# Patient Record
Sex: MALE | Race: WHITE | Employment: FULL TIME | URBAN - METROPOLITAN AREA
[De-identification: names, ages, dates, MRNs, and addresses within clinical notes are randomized per-mention and may not be internally consistent; named-entity substitution may affect disease eponyms.]

---

## 2017-05-01 RX ORDER — TRAMADOL HYDROCHLORIDE 50 MG/1
50 TABLET ORAL EVERY 8 HOURS PRN
Qty: 30 TABLET | Refills: 0 | OUTPATIENT
Start: 2017-05-01 | End: 2017-05-11

## 2017-06-29 ENCOUNTER — OFFICE VISIT (OUTPATIENT)
Dept: FAMILY MEDICINE CLINIC | Age: 62
End: 2017-06-29

## 2017-06-29 VITALS
RESPIRATION RATE: 18 BRPM | OXYGEN SATURATION: 95 % | DIASTOLIC BLOOD PRESSURE: 84 MMHG | HEIGHT: 68 IN | SYSTOLIC BLOOD PRESSURE: 128 MMHG | WEIGHT: 175.6 LBS | BODY MASS INDEX: 26.61 KG/M2 | HEART RATE: 64 BPM

## 2017-06-29 DIAGNOSIS — M50.90 CERVICAL NECK PAIN WITH EVIDENCE OF DISC DISEASE: ICD-10-CM

## 2017-06-29 DIAGNOSIS — I10 ESSENTIAL HYPERTENSION, BENIGN: ICD-10-CM

## 2017-06-29 DIAGNOSIS — E04.1 THYROID NODULE: Primary | ICD-10-CM

## 2017-06-29 PROCEDURE — 99214 OFFICE O/P EST MOD 30 MIN: CPT | Performed by: INTERNAL MEDICINE

## 2017-06-29 ASSESSMENT — ENCOUNTER SYMPTOMS
ALLERGIC/IMMUNOLOGIC NEGATIVE: 1
GASTROINTESTINAL NEGATIVE: 1
RESPIRATORY NEGATIVE: 1
BACK PAIN: 0

## 2017-07-03 ENCOUNTER — HOSPITAL ENCOUNTER (OUTPATIENT)
Dept: ULTRASOUND IMAGING | Age: 62
Discharge: HOME | End: 2017-07-03
Attending: INTERNAL MEDICINE | Admitting: INTERNAL MEDICINE

## 2017-07-03 DIAGNOSIS — E04.1 THYROID NODULE: ICD-10-CM

## 2017-07-05 ENCOUNTER — TELEPHONE (OUTPATIENT)
Dept: FAMILY MEDICINE CLINIC | Age: 62
End: 2017-07-05

## 2017-07-05 NOTE — TELEPHONE ENCOUNTER
Patient returned call regarding results. I read the physician notes, he understood, will discuss further at his next office visit (September).

## 2017-09-19 ENCOUNTER — OFFICE VISIT (OUTPATIENT)
Dept: ORTHOPEDIC SURGERY | Age: 62
End: 2017-09-19

## 2017-09-19 VITALS — WEIGHT: 180 LBS | BODY MASS INDEX: 27.28 KG/M2 | HEIGHT: 68 IN

## 2017-09-19 DIAGNOSIS — M25.512 LEFT SHOULDER PAIN, UNSPECIFIED CHRONICITY: Primary | ICD-10-CM

## 2017-09-19 DIAGNOSIS — M75.02 ADHESIVE CAPSULITIS OF LEFT SHOULDER: ICD-10-CM

## 2017-09-19 PROCEDURE — 73030 X-RAY EXAM OF SHOULDER: CPT | Performed by: PHYSICIAN ASSISTANT

## 2017-09-19 PROCEDURE — 99243 OFF/OP CNSLTJ NEW/EST LOW 30: CPT | Performed by: PHYSICIAN ASSISTANT

## 2017-09-19 RX ORDER — GABAPENTIN 100 MG/1
CAPSULE ORAL
Refills: 2 | COMMUNITY
Start: 2017-09-09 | End: 2018-07-27 | Stop reason: ALTCHOICE

## 2017-09-19 RX ORDER — HYDROCODONE BITARTRATE AND ACETAMINOPHEN 5; 325 MG/1; MG/1
TABLET ORAL
Refills: 0 | COMMUNITY
Start: 2017-07-06 | End: 2018-01-09 | Stop reason: ALTCHOICE

## 2017-09-21 ENCOUNTER — TELEPHONE (OUTPATIENT)
Dept: ORTHOPEDIC SURGERY | Age: 62
End: 2017-09-21

## 2017-09-25 ENCOUNTER — OFFICE VISIT (OUTPATIENT)
Dept: FAMILY MEDICINE CLINIC | Age: 62
End: 2017-09-25

## 2017-09-25 ENCOUNTER — TELEPHONE (OUTPATIENT)
Dept: FAMILY MEDICINE CLINIC | Age: 62
End: 2017-09-25

## 2017-09-25 VITALS
HEIGHT: 68 IN | OXYGEN SATURATION: 96 % | WEIGHT: 180.2 LBS | HEART RATE: 60 BPM | DIASTOLIC BLOOD PRESSURE: 72 MMHG | BODY MASS INDEX: 27.31 KG/M2 | SYSTOLIC BLOOD PRESSURE: 124 MMHG

## 2017-09-25 DIAGNOSIS — M75.02 ADHESIVE CAPSULITIS OF LEFT SHOULDER: Primary | ICD-10-CM

## 2017-09-25 DIAGNOSIS — Z01.818 PRE-OP EXAM: ICD-10-CM

## 2017-09-25 PROCEDURE — 99242 OFF/OP CONSLTJ NEW/EST SF 20: CPT | Performed by: PHYSICIAN ASSISTANT

## 2017-09-25 PROCEDURE — 93000 ELECTROCARDIOGRAM COMPLETE: CPT | Performed by: PHYSICIAN ASSISTANT

## 2017-09-25 RX ORDER — TRAMADOL HYDROCHLORIDE 50 MG/1
50 TABLET ORAL EVERY 8 HOURS PRN
Qty: 30 TABLET | Refills: 0 | Status: SHIPPED | OUTPATIENT
Start: 2017-09-25 | End: 2017-10-05

## 2017-09-25 RX ORDER — MAGNESIUM GLUCONATE 27 MG(500)
500 TABLET ORAL 2 TIMES DAILY
COMMUNITY
End: 2020-01-13 | Stop reason: ALTCHOICE

## 2017-09-26 ENCOUNTER — TELEPHONE (OUTPATIENT)
Dept: FAMILY MEDICINE CLINIC | Age: 62
End: 2017-09-26

## 2017-10-04 DIAGNOSIS — M75.02 ADHESIVE CAPSULITIS OF LEFT SHOULDER: Primary | ICD-10-CM

## 2017-10-05 ENCOUNTER — HOSPITAL ENCOUNTER (OUTPATIENT)
Dept: PHYSICAL THERAPY | Age: 62
Discharge: HOME | End: 2017-10-31
Admitting: ORTHOPAEDIC SURGERY

## 2017-10-05 NOTE — PLAN OF CARE
tear   []Signs/symptoms consistent with labral tear   []Signs/symptoms consistent with postural dysfunction    []Signs/symptoms consistent with Glenohumeral IR Deficit - <45 degrees   []Signs/symptoms consistent with facet dysfunction of cervical/thoracic spine    []Signs/symptoms consistent with pathology which may benefit from Dry needling     []other:     Prognosis/Rehab Potential:      []Excellent   [x]Good    []Fair   []Poor    Tolerance of evaluation/treatment:    []Excellent   [x]Good    []Fair   []Poor    Physical Therapy Evaluation Complexity Justification  [x] A history of present problem with:  [] no personal factors and/or comorbidities that impact the plan of care;  [x]1-2 personal factors and/or comorbidities that impact the plan of care  []3 personal factors and/or comorbidities that impact the plan of care  [x] An examination of body systems using standardized tests and measures addressing any of the following: body structures and functions (impairments), activity limitations, and/or participation restrictions;:  [] a total of 1-2 or more elements   [] a total of 3 or more elements   [x] a total of 4 or more elements   [x] A clinical presentation with:  [x] stable and/or uncomplicated characteristics   [] evolving clinical presentation with changing characteristics  [] unstable and unpredictable characteristics;   [] Clinical decision making of [x] low, [] moderate, [] high complexity using standardized patient assessment instrument and/or measurable assessment of functional outcome. [x] EVAL (LOW) 54837 (typically 20 minutes face-to-face)  [] EVAL (MOD) 35287 (typically 30 minutes face-to-face)  [] EVAL (HIGH) 57789 (typically 45 minutes face-to-face)  [] RE-EVAL     PLAN:  Frequency/Duration:  5 days per week for 2 Weeks, then 2-3x/wk for 4 weeks. :  INTERVENTIONS:  1. Therapeutic exercise including: strength training, ROM, NMR and proprioception for the scapula, core and Upper extremity  2.

## 2017-10-05 NOTE — FLOWSHEET NOTE
[facility] - Orthopaedics and Sports Rehabilitation, [place]    Physical Therapy Daily Treatment Note  Date:  10/5/2017    Patient Name:  Lauren Parker    :  1955  MRN: [id number]  Restrictions/Precautions:    Medical/Treatment Diagnosis Information:  · Diagnosis: M75.02 (ICD-10-CM) - Adhesive capsulitis of left shoulder- S/P INGRID  · Treatment Diagnosis: Left Shoulder Pain and Stiffness. Insurance/Certification information:  PT Insurance Information: UMR  Physician Information:  Referring Practitioner: Dr. Komal Wells of care signed (Y/N):     Date of Patient follow up with Physician:     G-Code (if applicable):      Date G-Code Applied:    PT G-Codes  Functional Assessment Tool Used: Michael Spindle  Score: 64%  Functional Limitation: Carrying, moving and handling objects  Carrying, Moving and Handling Objects Current Status (): At least 60 percent but less than 80 percent impaired, limited or restricted  Carrying, Moving and Handling Objects Goal Status ():  At least 20 percent but less than 40 percent impaired, limited or restricted    Progress Note: [x]  Yes  []  No  Next due by: Visit #10      Latex Allergy:  [x]NO      []YES  Preferred Language for Healthcare:   [x]English       []other:    Visit # Insurance Allowable   1 60     Pain level:  3-810     SUBJECTIVE:  See eval    OBJECTIVE: See eval  Observation:   Test measurements:      RESTRICTIONS/PRECAUTIONS: HBP, OA and Cervical Spine Stenosis    Exercises/Interventions:     Script-17  Stretching/AAROM  Repetitions/Resistance Notes/Last Progression   Pulley/Wallslides     Cane Flex/ ER X15/15x5\"    IR Strap 10x5\"    Sleeper Stretch     Tableslide Flex/ ER/ Abd 10x10\"    ER Hands Behind the Head x1' HEP asked to perform x2 reps   Cross Arm Stretch     Upper Trap Stretch     Levator Scapula Stretch     Corner Stretch                  Exercises     Shrugs/ Shoulder Blade Squeeze     Shoulder Isometrics     SA Punch/ ABC     Supine Short Lever Flex     Supine Flexion     SL ER/ SL Abd     Prone Row/ Ext/ HAB/ Scap     TB Row/ Ext/ LTD     TB ER/ IR     No Money/ HAB     Finisher                      Manual       Oscillations-Mobs:  G-I, II, III, IV (PA's, Inf., Post.) x5'    PROM x10'    Cervical PA's Grade-     Thoracic PA's Grade-     STM-                   Therapeutic Exercise and NMR EXR  [] (87427) Provided verbal/tactile cueing for activities related to strengthening, flexibility, endurance, ROM  for improvements in scapular, scapulothoracic and UE control with self care, reaching, carrying, lifting, house/yardwork, driving/computer work.    [] (43611) Provided verbal/tactile cueing for activities related to improving balance, coordination, kinesthetic sense, posture, motor skill, proprioception  to assist with  scapular, scapulothoracic and UE control with self care, reaching, carrying, lifting, house/yardwork, driving/computer work. Therapeutic Activities:    [] (92717 or 12514) Provided verbal/tactile cueing for activities related to improving balance, coordination, kinesthetic sense, posture, motor skill, proprioception and motor activation to allow for proper function of scapular, scapulothoracic and UE control with self care, carrying, lifting, driving/computer work.      Home Exercise Program:    [x] (53943) Reviewed/Progressed HEP activities related to strengthening, flexibility, endurance, ROM of scapular, scapulothoracic and UE control with self care, reaching, carrying, lifting, house/yardwork, driving/computer work  [] (90514) Reviewed/Progressed HEP activities related to improving balance, coordination, kinesthetic sense, posture, motor skill, proprioception of scapular, scapulothoracic and UE control with self care, reaching, carrying, lifting, house/yardwork, driving/computer work      Manual Treatments:  PROM / STM / Oscillations-Mobs:  G-I, II, III, IV (PA's, Inf., Post.)  [x] (72096) Provided manual therapy to due to co-morbidities. [x] Plan just implemented, too soon to assess goals progression  [] Other:     ASSESSMENT:  See eval    Treatment/Activity Tolerance:  [x] Patient tolerated treatment well [] Patient limited by fatique  [] Patient limited by pain  [] Patient limited by other medical complications  [] Other:     Prognosis: [x] Good [] Fair  [] Poor    Patient Requires Follow-up: [x] Yes  [] No    PLAN: See eval  [] Continue per plan of care [] Alter current plan (see comments)  [x] Plan of care initiated [] Hold pending MD visit [] Discharge    *If patient does not return for further follow ups after this date. Please consider this as the patients discharge from physical therapy.      Electronically signed by: Estrella Blanco, [facility] [facility], Carmen Vaughn 1

## 2017-10-06 ENCOUNTER — HOSPITAL ENCOUNTER (OUTPATIENT)
Dept: PHYSICAL THERAPY | Age: 62
Discharge: HOME | End: 2017-10-07
Admitting: ORTHOPAEDIC SURGERY

## 2017-10-09 ENCOUNTER — HOSPITAL ENCOUNTER (OUTPATIENT)
Dept: PHYSICAL THERAPY | Age: 62
Discharge: HOME | End: 2017-10-10
Admitting: ORTHOPAEDIC SURGERY

## 2017-10-10 ENCOUNTER — HOSPITAL ENCOUNTER (OUTPATIENT)
Dept: PHYSICAL THERAPY | Age: 62
Discharge: HOME | End: 2017-10-11
Admitting: ORTHOPAEDIC SURGERY

## 2017-10-11 ENCOUNTER — HOSPITAL ENCOUNTER (OUTPATIENT)
Dept: PHYSICAL THERAPY | Age: 62
Discharge: HOME | End: 2017-10-12
Admitting: ORTHOPAEDIC SURGERY

## 2017-10-12 ENCOUNTER — HOSPITAL ENCOUNTER (OUTPATIENT)
Dept: PHYSICAL THERAPY | Age: 62
Discharge: HOME | End: 2017-10-13
Admitting: ORTHOPAEDIC SURGERY

## 2017-10-12 ENCOUNTER — OFFICE VISIT (OUTPATIENT)
Dept: ORTHOPEDIC SURGERY | Age: 62
End: 2017-10-12

## 2017-10-12 VITALS
DIASTOLIC BLOOD PRESSURE: 82 MMHG | SYSTOLIC BLOOD PRESSURE: 111 MMHG | WEIGHT: 180.12 LBS | HEIGHT: 68 IN | HEART RATE: 70 BPM | BODY MASS INDEX: 27.3 KG/M2

## 2017-10-12 DIAGNOSIS — M67.40 MUCOID CYST OF JOINT: Primary | ICD-10-CM

## 2017-10-12 PROBLEM — M48.02 CERVICAL SPINAL STENOSIS: Status: ACTIVE | Noted: 2017-05-23

## 2017-10-12 PROCEDURE — 99203 OFFICE O/P NEW LOW 30 MIN: CPT | Performed by: PODIATRIST

## 2017-10-12 RX ORDER — GABAPENTIN 100 MG/1
CAPSULE ORAL
COMMUNITY
Start: 2017-08-21 | End: 2018-01-09 | Stop reason: DRUGHIGH

## 2017-10-12 RX ORDER — METHYLPREDNISOLONE 4 MG/1
TABLET ORAL
COMMUNITY
Start: 2017-05-08 | End: 2017-12-12 | Stop reason: ALTCHOICE

## 2017-10-12 RX ORDER — TIZANIDINE 2 MG/1
TABLET ORAL
COMMUNITY
Start: 2017-05-08 | End: 2018-01-09 | Stop reason: ALTCHOICE

## 2017-10-12 RX ORDER — GABAPENTIN 100 MG/1
100 CAPSULE ORAL
COMMUNITY
End: 2018-01-09 | Stop reason: DRUGHIGH

## 2017-10-12 RX ORDER — OXYCODONE HYDROCHLORIDE AND ACETAMINOPHEN 5; 325 MG/1; MG/1
TABLET ORAL
COMMUNITY
Start: 2017-10-04 | End: 2018-07-27 | Stop reason: ALTCHOICE

## 2017-10-12 RX ORDER — IBUPROFEN 200 MG
200 TABLET ORAL
COMMUNITY
End: 2018-01-09 | Stop reason: SDUPTHER

## 2017-10-12 RX ORDER — GABAPENTIN 100 MG/1
CAPSULE ORAL
COMMUNITY
Start: 2017-08-18 | End: 2018-01-09 | Stop reason: DRUGHIGH

## 2017-10-12 RX ORDER — LISINOPRIL 20 MG/1
20 TABLET ORAL
COMMUNITY
End: 2018-01-09 | Stop reason: SDUPTHER

## 2017-10-13 ENCOUNTER — HOSPITAL ENCOUNTER (OUTPATIENT)
Dept: PHYSICAL THERAPY | Age: 62
Discharge: HOME | End: 2017-10-14
Admitting: ORTHOPAEDIC SURGERY

## 2017-10-16 ENCOUNTER — HOSPITAL ENCOUNTER (OUTPATIENT)
Dept: PHYSICAL THERAPY | Age: 62
Discharge: HOME | End: 2017-10-17
Admitting: ORTHOPAEDIC SURGERY

## 2017-10-17 ENCOUNTER — OFFICE VISIT (OUTPATIENT)
Dept: ORTHOPEDIC SURGERY | Age: 62
End: 2017-10-17

## 2017-10-17 DIAGNOSIS — M75.02 ADHESIVE CAPSULITIS OF LEFT SHOULDER: Primary | ICD-10-CM

## 2017-10-17 PROCEDURE — 99024 POSTOP FOLLOW-UP VISIT: CPT | Performed by: ORTHOPAEDIC SURGERY

## 2017-10-18 ENCOUNTER — HOSPITAL ENCOUNTER (OUTPATIENT)
Dept: PHYSICAL THERAPY | Age: 62
Discharge: HOME | End: 2017-10-19
Admitting: ORTHOPAEDIC SURGERY

## 2017-10-23 ENCOUNTER — HOSPITAL ENCOUNTER (OUTPATIENT)
Dept: PHYSICAL THERAPY | Age: 62
Discharge: HOME | End: 2017-10-24
Admitting: ORTHOPAEDIC SURGERY

## 2017-10-27 ENCOUNTER — HOSPITAL ENCOUNTER (OUTPATIENT)
Dept: PHYSICAL THERAPY | Age: 62
Discharge: HOME | End: 2017-10-28
Admitting: ORTHOPAEDIC SURGERY

## 2017-10-30 ENCOUNTER — HOSPITAL ENCOUNTER (OUTPATIENT)
Dept: PHYSICAL THERAPY | Age: 62
Discharge: HOME | End: 2017-10-31
Admitting: ORTHOPAEDIC SURGERY

## 2017-11-01 ENCOUNTER — HOSPITAL ENCOUNTER (OUTPATIENT)
Dept: PHYSICAL THERAPY | Age: 62
Discharge: HOME | End: 2017-11-02
Admitting: ORTHOPAEDIC SURGERY

## 2017-11-01 ENCOUNTER — HOSPITAL ENCOUNTER (OUTPATIENT)
Dept: PHYSICAL THERAPY | Age: 62
Discharge: HOME | End: 2017-11-30
Attending: ORTHOPAEDIC SURGERY | Admitting: ORTHOPAEDIC SURGERY

## 2017-11-06 ENCOUNTER — HOSPITAL ENCOUNTER (OUTPATIENT)
Dept: PHYSICAL THERAPY | Age: 62
Discharge: HOME | End: 2017-11-07
Admitting: ORTHOPAEDIC SURGERY

## 2017-11-06 ENCOUNTER — OFFICE VISIT (OUTPATIENT)
Dept: ORTHOPEDIC SURGERY | Age: 62
End: 2017-11-06

## 2017-11-06 VITALS
SYSTOLIC BLOOD PRESSURE: 130 MMHG | WEIGHT: 180.12 LBS | DIASTOLIC BLOOD PRESSURE: 78 MMHG | HEIGHT: 68 IN | HEART RATE: 70 BPM | BODY MASS INDEX: 27.3 KG/M2

## 2017-11-06 DIAGNOSIS — M67.40 MUCOID CYST OF JOINT: Primary | ICD-10-CM

## 2017-11-06 PROCEDURE — 99213 OFFICE O/P EST LOW 20 MIN: CPT | Performed by: PODIATRIST

## 2017-11-06 NOTE — PROGRESS NOTES
This is a return visit for patient to suddenly had increased pain with the cyst on his left great toe last week. Apparently the symptoms have totally resolved. Without any injury he started having pain that actually woke him up at night. This seemed to persist for a couple of days. He has noticed that the cyst appears to be smaller. He denies seeing any bleeding or drainage. The immediate skin around the cyst at the plantar medial aspect of left hallux is slightly ruborous and exfoliating. No drainage or signs of infection are noted. He has no palpable tenderness at this time. The size of the lesion doesn't appear to be slightly smaller. It still remains slightly indurated and appears to be fluid-filled. He has palpable pedal pulses. His sensation is grossly intact. Mucoid cyst left hallux    Since he is asymptomatic again, nothing needs to be done other than continue to monitor this. He may go through several episodes of this from time to time. I suspect that the cyst did rupture and part of the contents probably didn't leak out. As long as he does not see any active bleeding or any drainage itself, nothing needs to be done.

## 2017-11-08 ENCOUNTER — HOSPITAL ENCOUNTER (OUTPATIENT)
Dept: PHYSICAL THERAPY | Age: 62
Discharge: HOME | End: 2017-11-09
Admitting: ORTHOPAEDIC SURGERY

## 2017-11-13 ENCOUNTER — HOSPITAL ENCOUNTER (OUTPATIENT)
Dept: PHYSICAL THERAPY | Age: 62
Discharge: HOME | End: 2017-11-14
Admitting: ORTHOPAEDIC SURGERY

## 2017-11-15 ENCOUNTER — HOSPITAL ENCOUNTER (OUTPATIENT)
Dept: PHYSICAL THERAPY | Age: 62
Discharge: HOME | End: 2017-11-16
Admitting: ORTHOPAEDIC SURGERY

## 2017-11-20 ENCOUNTER — HOSPITAL ENCOUNTER (OUTPATIENT)
Dept: PHYSICAL THERAPY | Age: 62
Discharge: HOME | End: 2017-11-21
Admitting: ORTHOPAEDIC SURGERY

## 2017-11-22 ENCOUNTER — HOSPITAL ENCOUNTER (OUTPATIENT)
Dept: PHYSICAL THERAPY | Age: 62
Discharge: HOME | End: 2017-11-23
Admitting: ORTHOPAEDIC SURGERY

## 2017-11-27 ENCOUNTER — HOSPITAL ENCOUNTER (OUTPATIENT)
Dept: PHYSICAL THERAPY | Age: 62
Discharge: HOME | End: 2017-11-28
Admitting: ORTHOPAEDIC SURGERY

## 2017-11-29 ENCOUNTER — HOSPITAL ENCOUNTER (OUTPATIENT)
Dept: PHYSICAL THERAPY | Age: 62
Discharge: HOME | End: 2017-11-30
Admitting: ORTHOPAEDIC SURGERY

## 2017-12-01 ENCOUNTER — HOSPITAL ENCOUNTER (OUTPATIENT)
Dept: PHYSICAL THERAPY | Age: 62
Discharge: HOME | End: 2017-12-31
Attending: ORTHOPAEDIC SURGERY | Admitting: ORTHOPAEDIC SURGERY

## 2017-12-04 RX ORDER — LISINOPRIL 20 MG/1
20 TABLET ORAL DAILY
Qty: 90 TABLET | Refills: 3 | Status: SHIPPED | OUTPATIENT
Start: 2017-12-04 | End: 2017-12-21 | Stop reason: SDUPTHER

## 2017-12-04 NOTE — TELEPHONE ENCOUNTER
From: Lauren Parker  Sent: 12/4/2017 10:16 AM EST  Subject: Medication Renewal Request    Lauren Parker would like a refill of the following medications:  lisinopril (PRINIVIL;ZESTRIL) 20 MG tablet [TREVON Dave MD]    Preferred pharmacy: [address] [place] [address] , [place]    Comment:  Stephen Ramirez Laughter, my prescription for Lisinopril is running out and the label says \"no refills remaining. \" Can a refill be ordered?  Thanks, Soren Hernandez [place], [place]

## 2017-12-06 ENCOUNTER — HOSPITAL ENCOUNTER (OUTPATIENT)
Dept: PHYSICAL THERAPY | Age: 62
Discharge: HOME | End: 2017-12-07
Admitting: ORTHOPAEDIC SURGERY

## 2017-12-08 ENCOUNTER — HOSPITAL ENCOUNTER (OUTPATIENT)
Dept: PHYSICAL THERAPY | Age: 62
Discharge: HOME | End: 2017-12-09
Admitting: ORTHOPAEDIC SURGERY

## 2017-12-12 ENCOUNTER — OFFICE VISIT (OUTPATIENT)
Dept: ORTHOPEDIC SURGERY | Age: 62
End: 2017-12-12

## 2017-12-12 VITALS — HEIGHT: 68 IN | BODY MASS INDEX: 27.3 KG/M2 | WEIGHT: 180.12 LBS

## 2017-12-12 DIAGNOSIS — M75.02 ADHESIVE CAPSULITIS OF LEFT SHOULDER: Primary | ICD-10-CM

## 2017-12-12 PROCEDURE — 99213 OFFICE O/P EST LOW 20 MIN: CPT | Performed by: ORTHOPAEDIC SURGERY

## 2017-12-12 NOTE — PROGRESS NOTES
HISTORY OF PRESENT ILLNESS: The patient returns today for 6 weeks after closed manipulation under anesthesia of his left shoulder. He has attended physical therapy and feels significantly improved    REVIEW OF SYSTEMS: Pertinent items are noted in the HPI. Review of symptoms reviewed from the Patient History Form and dated on 10/12/17 are available in the patient's chart under the Media tab. PHYSICAL EXAMINATION: Inspection of the affected left shoulder reveals warm, dry, intact skin. There is no adenopathy. The distal neurovascular exam is grossly intact. There is no glenohumeral instability. He has 160° of active forward elevation. He can position his hand behind his head. With the arm side, he has 25° of external rotation. He has 5 minus out of 5 forward elevation and external rotation strength    Examination of the contralateral shoulder reveals no atrophy or deformity. The skin is warm and dry. Range of motion is within normal limits. There is no focal tenderness with palpation. Provocative SLAP, biceps tension, apprehension AC joint or rotator cuff tests are negative. Strength is graded 5/5 in all muscle groups. The distal neurovascular exam is grossly intact. Cervical spine: The skin is warm and dry. There is no swelling, warmth, or erythema. Range of motion is within normal limits. There is no paraspinal or spinous process tenderness. Spurling's sign is negative and did not produce shoulder pain. The distal neurovascular exam is grossly intact. ASSESSMENT:  Doing well 6 weeks after closed manipulation under anesthesia of his left shoulder    PLAN:  I had a detailed discussion with Dalerichardadriano is improved. He continues to have some neck related symptoms may benefit from evaluation by a nonoperative spine specialists. He may try some dry needling of physical therapy. I'll reevaluate him at his request.  He agreed with our plan.

## 2017-12-21 ENCOUNTER — TELEPHONE (OUTPATIENT)
Dept: FAMILY MEDICINE CLINIC | Age: 62
End: 2017-12-21

## 2017-12-21 RX ORDER — LISINOPRIL 20 MG/1
20 TABLET ORAL DAILY
Qty: 90 TABLET | Refills: 3 | Status: SHIPPED | OUTPATIENT
Start: 2017-12-21 | End: 2018-01-09 | Stop reason: SDUPTHER

## 2017-12-21 NOTE — TELEPHONE ENCOUNTER
[facility] has no record for patient-pt asking now for rx to be sent to [facility] please sign above

## 2018-01-01 ENCOUNTER — HOSPITAL ENCOUNTER (OUTPATIENT)
Dept: PHYSICAL THERAPY | Age: 63
Discharge: HOME | End: 2018-01-31
Attending: ORTHOPAEDIC SURGERY | Admitting: ORTHOPAEDIC SURGERY

## 2018-01-04 ENCOUNTER — TELEPHONE (OUTPATIENT)
Dept: FAMILY MEDICINE CLINIC | Age: 63
End: 2018-01-04

## 2018-01-04 DIAGNOSIS — Z12.5 SCREENING PSA (PROSTATE SPECIFIC ANTIGEN): ICD-10-CM

## 2018-01-04 DIAGNOSIS — Z00.00 ANNUAL PHYSICAL EXAM: Primary | ICD-10-CM

## 2018-01-04 NOTE — TELEPHONE ENCOUNTER
The patient is scheduled to have his physical on 1/9/18 with Dr. Gerda Lopez. He is asking that [facility]. He would like to go tomorrow if at all possible. I will notify patient once those orders are placed.

## 2018-01-05 DIAGNOSIS — Z12.5 SCREENING PSA (PROSTATE SPECIFIC ANTIGEN): ICD-10-CM

## 2018-01-05 DIAGNOSIS — Z00.00 ANNUAL PHYSICAL EXAM: ICD-10-CM

## 2018-01-05 LAB
ALBUMIN SERPL-MCNC: 4.6 G/DL (ref 3.4–5)
ALP BLD-CCNC: 60 U/L (ref 40–129)
ALT SERPL-CCNC: 19 U/L (ref 10–40)
ANION GAP SERPL CALCULATED.3IONS-SCNC: 11 MMOL/L (ref 3–16)
AST SERPL-CCNC: 18 U/L (ref 15–37)
BILIRUB SERPL-MCNC: 0.4 MG/DL (ref 0–1)
BILIRUBIN DIRECT: <0.2 MG/DL (ref 0–0.3)
BILIRUBIN, INDIRECT: NORMAL MG/DL (ref 0–1)
BUN BLDV-MCNC: 26 MG/DL (ref 7–20)
CALCIUM SERPL-MCNC: 10.2 MG/DL (ref 8.3–10.6)
CHLORIDE BLD-SCNC: 101 MMOL/L (ref 99–110)
CHOLESTEROL, TOTAL: 196 MG/DL (ref 0–199)
CO2: 30 MMOL/L (ref 21–32)
CREAT SERPL-MCNC: 1.1 MG/DL (ref 0.8–1.3)
GFR AFRICAN AMERICAN: >60
GFR NON-AFRICAN AMERICAN: >60
GLUCOSE BLD-MCNC: 90 MG/DL (ref 70–99)
HCT VFR BLD CALC: 48.9 % (ref 40.5–52.5)
HDLC SERPL-MCNC: 49 MG/DL (ref 40–60)
HEMOGLOBIN: 16.3 G/DL (ref 13.5–17.5)
LDL CHOLESTEROL CALCULATED: 110 MG/DL
MCH RBC QN AUTO: 31.9 PG (ref 26–34)
MCHC RBC AUTO-ENTMCNC: 33.3 G/DL (ref 31–36)
MCV RBC AUTO: 95.6 FL (ref 80–100)
PDW BLD-RTO: 14.6 % (ref 12.4–15.4)
PHOSPHORUS: 3.7 MG/DL (ref 2.5–4.9)
PLATELET # BLD: 329 K/UL (ref 135–450)
PMV BLD AUTO: 7.5 FL (ref 5–10.5)
POTASSIUM SERPL-SCNC: 4.7 MMOL/L (ref 3.5–5.1)
PROSTATE SPECIFIC ANTIGEN: 2.75 NG/ML (ref 0–4)
RBC # BLD: 5.12 M/UL (ref 4.2–5.9)
SODIUM BLD-SCNC: 142 MMOL/L (ref 136–145)
TOTAL PROTEIN: 6.8 G/DL (ref 6.4–8.2)
TRIGL SERPL-MCNC: 183 MG/DL (ref 0–150)
TSH SERPL DL<=0.05 MIU/L-ACNC: 1.02 UIU/ML (ref 0.27–4.2)
VLDLC SERPL CALC-MCNC: 37 MG/DL
WBC # BLD: 7.3 K/UL (ref 4–11)

## 2018-01-09 ENCOUNTER — OFFICE VISIT (OUTPATIENT)
Dept: FAMILY MEDICINE CLINIC | Age: 63
End: 2018-01-09

## 2018-01-09 VITALS
HEIGHT: 68 IN | BODY MASS INDEX: 27.16 KG/M2 | DIASTOLIC BLOOD PRESSURE: 76 MMHG | WEIGHT: 179.2 LBS | OXYGEN SATURATION: 97 % | SYSTOLIC BLOOD PRESSURE: 136 MMHG | RESPIRATION RATE: 18 BRPM | HEART RATE: 55 BPM

## 2018-01-09 DIAGNOSIS — I10 ESSENTIAL HYPERTENSION, BENIGN: ICD-10-CM

## 2018-01-09 DIAGNOSIS — I47.1 SUPRAVENTRICULAR TACHYCARDIA (HCC): ICD-10-CM

## 2018-01-09 DIAGNOSIS — Z00.00 ANNUAL PHYSICAL EXAM: Primary | ICD-10-CM

## 2018-01-09 DIAGNOSIS — M75.02 ADHESIVE CAPSULITIS OF LEFT SHOULDER: ICD-10-CM

## 2018-01-09 PROCEDURE — 99396 PREV VISIT EST AGE 40-64: CPT | Performed by: INTERNAL MEDICINE

## 2018-01-09 RX ORDER — DICLOFENAC SODIUM 75 MG/1
75 TABLET, DELAYED RELEASE ORAL 2 TIMES DAILY
Qty: 60 TABLET | Refills: 3 | Status: SHIPPED | OUTPATIENT
Start: 2018-01-09 | End: 2018-07-27 | Stop reason: ALTCHOICE

## 2018-01-09 RX ORDER — LISINOPRIL 20 MG/1
20 TABLET ORAL DAILY
Qty: 90 TABLET | Refills: 3 | Status: SHIPPED | OUTPATIENT
Start: 2018-01-09 | End: 2019-02-22 | Stop reason: SDUPTHER

## 2018-01-09 RX ORDER — TRAMADOL HYDROCHLORIDE 50 MG/1
50 TABLET ORAL EVERY 6 HOURS PRN
COMMUNITY
End: 2018-07-27 | Stop reason: ALTCHOICE

## 2018-01-09 ASSESSMENT — ENCOUNTER SYMPTOMS
BACK PAIN: 0
GASTROINTESTINAL NEGATIVE: 1
RESPIRATORY NEGATIVE: 1
EYES NEGATIVE: 1
ALLERGIC/IMMUNOLOGIC NEGATIVE: 1

## 2018-01-09 ASSESSMENT — PATIENT HEALTH QUESTIONNAIRE - PHQ9
SUM OF ALL RESPONSES TO PHQ9 QUESTIONS 1 & 2: 0
2. FEELING DOWN, DEPRESSED OR HOPELESS: 0
1. LITTLE INTEREST OR PLEASURE IN DOING THINGS: 0
SUM OF ALL RESPONSES TO PHQ QUESTIONS 1-9: 0

## 2018-01-09 NOTE — PROGRESS NOTES
exudate, no hemorrhage and no papilledema. The right eye shows no red reflex and no venous pulsations. The left eye shows no arteriolar narrowing, no AV nicking, no exudate, no hemorrhage and no papilledema. The left eye shows no red reflex and no venous pulsations. Neck: Trachea normal, normal range of motion and full passive range of motion without pain. Neck supple. Normal carotid pulses, no hepatojugular reflux and no JVD present. Carotid bruit is not present. No tracheal deviation present. No thyroid mass and no thyromegaly present. Cardiovascular: Normal rate, regular rhythm, normal heart sounds, intact distal pulses and normal pulses. No extrasystoles are present. PMI is not displaced. Exam reveals no gallop and no friction rub. No murmur heard. Pulses:       Carotid pulses are 2+ on the right side, and 2+ on the left side. Radial pulses are 2+ on the right side, and 2+ on the left side. Femoral pulses are 2+ on the right side, and 2+ on the left side. Popliteal pulses are 2+ on the right side, and 2+ on the left side. Dorsalis pedis pulses are 2+ on the right side, and 2+ on the left side. Posterior tibial pulses are 2+ on the right side, and 2+ on the left side. Pulmonary/Chest: Effort normal and breath sounds normal. No accessory muscle usage or stridor. No apnea, no tachypnea and no bradypnea. No respiratory distress. He has no decreased breath sounds. He has no wheezes. He has no rhonchi. He has no rales. He exhibits no tenderness. Abdominal: Soft. Normal appearance, normal aorta and bowel sounds are normal. He exhibits no shifting dullness, no distension, no pulsatile liver, no fluid wave, no abdominal bruit, no ascites, no pulsatile midline mass and no mass. There is no hepatosplenomegaly. There is no tenderness. There is no rebound, no guarding and no CVA tenderness. No hernia.  Hernia confirmed negative in the ventral area, confirmed negative in the right inguinal area and confirmed negative in the left inguinal area. Genitourinary: Rectum normal, prostate normal, testes normal and penis normal. Rectal exam shows guaiac negative stool. Cremasteric reflex is present. No penile tenderness. Musculoskeletal: Normal range of motion. He exhibits tenderness (L Trapezius spasm). He exhibits no edema. Lymphadenopathy:        Head (right side): No submental, no submandibular, no tonsillar, no preauricular, no posterior auricular and no occipital adenopathy present. Head (left side): No submental, no submandibular, no tonsillar, no preauricular, no posterior auricular and no occipital adenopathy present. He has no cervical adenopathy. He has no axillary adenopathy. Right: No inguinal, no supraclavicular and no epitrochlear adenopathy present. Left: No inguinal, no supraclavicular and no epitrochlear adenopathy present. Neurological: He is alert and oriented to person, place, and time. He has normal strength and normal reflexes. He is not disoriented. He displays no atrophy and no tremor. No cranial nerve deficit or sensory deficit. He exhibits normal muscle tone. He displays a negative Romberg sign. He displays no seizure activity. Coordination and gait normal.   Reflex Scores:       Tricep reflexes are 2+ on the right side and 2+ on the left side. Bicep reflexes are 2+ on the right side and 2+ on the left side. Brachioradialis reflexes are 2+ on the right side and 2+ on the left side. Patellar reflexes are 2+ on the right side and 2+ on the left side. Achilles reflexes are 2+ on the right side and 2+ on the left side. Skin: Skin is warm, dry and intact. No abrasion and no rash noted. He is not diaphoretic. No cyanosis or erythema. No pallor. Nails show no clubbing. Psychiatric: He has a normal mood and affect.  His speech is normal and behavior is normal. Judgment and thought content normal. Cognition and

## 2018-01-17 ENCOUNTER — OFFICE VISIT (OUTPATIENT)
Dept: ORTHOPEDIC SURGERY | Age: 63
End: 2018-01-17

## 2018-01-17 VITALS
HEIGHT: 68 IN | SYSTOLIC BLOOD PRESSURE: 123 MMHG | DIASTOLIC BLOOD PRESSURE: 74 MMHG | WEIGHT: 180 LBS | BODY MASS INDEX: 27.28 KG/M2

## 2018-01-17 DIAGNOSIS — M79.18 MYOFACIAL MUSCLE PAIN: ICD-10-CM

## 2018-01-17 DIAGNOSIS — M47.812 SPONDYLOSIS OF CERVICAL REGION WITHOUT MYELOPATHY OR RADICULOPATHY: Primary | ICD-10-CM

## 2018-01-17 PROCEDURE — 99243 OFF/OP CNSLTJ NEW/EST LOW 30: CPT | Performed by: PHYSICAL MEDICINE & REHABILITATION

## 2018-01-17 NOTE — PROGRESS NOTES
no known allergies. Social History:    reports that he quit smoking about 9 years ago. He smoked 0.50 packs per day. He has never used smokeless tobacco. He reports that he drinks alcohol. He reports that he does not use drugs. Family History:   Family History   Problem Relation Age of Onset    High Blood Pressure Mother     Heart Disease Father        REVIEW OF SYSTEMS:   CONSTITUTIONAL: Denies unexplained weight loss, fevers, chills or fatigue  NEUROLOGICAL: Denies unsteady gait or progressive weakness  MUSCULOSKELETAL: Denies joint swelling or redness  GI: Denies nausea, vomiting, diarrhea   : Denies bowel or bladder issues       PHYSICAL EXAM:    Vitals: Blood pressure 123/74, height 5' 8\" (1.727 m), weight 180 lb (81.6 kg). GENERAL EXAM:  · General Apparence: Patient is adequately groomed with no evidence of malnutrition. · Psychiatric: Orientation: The patient is oriented to time, place and person. The patient's mood and affect are appropriate   · Vascular: Examination reveals no swelling and palpation reveals no tenderness in upper or lower extremities. Good capillary refill. · The lymphatic examination of the neck, axillae and groin reveals all areas to be without enlargement or induration  · Sensation is intact without deficit in the upper and lower extremities to light touch and pinprick  · Coordination of the upper and lower extremities are normal.    CERVICAL EXAMINATION:  · Inspection: Local inspection shows no step-off or bruising. Cervical alignment is normal. No instability is noted. · Palpation and Percussion: No evidence of tenderness at the midline. Paraspinal tenderness is present over the left upper trapezius. There is no paraspinal spasm. · Range of Motion:  limited by 25% in all planes due to pain   · Strength: 5/5 bilateral upper extremities  · Special Tests:   Spurling's and Flores's are negative bilaterally.     Rutledge and Impingement tests are negative

## 2018-01-30 ENCOUNTER — HOSPITAL ENCOUNTER (OUTPATIENT)
Dept: PHYSICAL THERAPY | Age: 63
Discharge: HOME | End: 2018-01-31
Admitting: PHYSICAL MEDICINE & REHABILITATION

## 2018-01-30 NOTE — PLAN OF CARE
PT G-Codes  Functional Assessment Tool Used: NDI  Score: 28%  Functional Limitation: Changing and maintaining body position  Changing and Maintaining Body Position Current Status (): At least 20 percent but less than 40 percent impaired, limited or restricted  Changing and Maintaining Body Position Goal Status (): At least 1 percent but less than 20 percent impaired, limited or restricted    Pain Scale: 3/10  Easing factors: NSAIDS and mvovement  Provocative factors: sleeping, reaching and overhead movement.s    Type: []Constant   [x]Intermittent  []Radiating []Localized []other:     Numbness/Tingling:Denied. Occupation/School:Professor    Living Status/Prior Level of Function: Independent with ADLs and IADLs, prior to to 4/2017. OBJECTIVE:     CERV ROM Left Right   Cervical Flexion 40    Cervical Extension 52    Cervical SB 32 42   Cervical rotation 65 72        ROM Left Right   Shoulder Flex [facility] WFL   Shoulder Abd [facility]   Shoulder ER [facility]   Shoulder IR [facility] WFL             Strength  Left Right   Shoulder Flex 5/5 5/5   Shoulder Scap 5/5 5/5   Shoulder ER 5/5 5/5   Shoulder IR 5/5 5/5             Reflexes Left Right   C5-6 Biceps Decreased WFL   C5-6 Brachioradialis Decreased WFL   C7-8 Triceps Decreased WFL     Dermatomal Sensation:  - Dermatomal sensation was intact to light touch bilaterally throughout upper and lower extremities. - Dermatomal sensation loss was found in the following dermatomal pattern. []L1  []L2 []L3  []L4 []L5  []S1   []C1 []C2 []C3 []C4[]C5 []C6 []C7 []C8 []T1    Reflexes/Sensation:    [x]Dermatomes/Myotomes intact    [x]Reflexes equal and normal bilaterally   []Other:    Joint mobility:    []Normal    [x]Hypo   []Hyper    Palpation: TTP UT    Functional Mobility/Transfers: Independent     Posture: Rounded shoulders and forward head. Bandages/Dressings/Incisions: none.      Gait: (include devices/WB status): WNL     Orthopedic Special Tests:   Special thermal modalities. Barriers to/and or personal factors that will affect rehab potential:              []Age  []Sex              []Motivation/Lack of Motivation                        [x]Co-Morbidities              []Cognitive Function, education/learning barriers              []Environmental, home barriers              []profession/work barriers  []past PT/medical experience  []other:  Justification:OA is degenerative. Falls Risk Assessment (30 days):   [x] Falls Risk assessed and no intervention required. [] Falls Risk assessed and Patient requires intervention due to being higher risk   TUG score (>12s at risk):     [] Falls education provided, including       G-Codes:  PT G-Codes  Functional Assessment Tool Used: NDI  Score: 28%  Functional Limitation: Changing and maintaining body position  Changing and Maintaining Body Position Current Status (): At least 20 percent but less than 40 percent impaired, limited or restricted  Changing and Maintaining Body Position Goal Status ():  At least 1 percent but less than 20 percent impaired, limited or restricted    ASSESSMENT:    Functional Impairments:     [x]Noted cervical/thoracic/GHJ joint hypomobility   []Noted cervical/thoracic/GHJ joint hypermobility   [x]Decreased cervical/UE functional ROM   []Noted Headache pain aggravated by neck movements with/without dizziness   []Abnormal reflexes/sensation/myotomal/dermatomal deficits   [x]Decreased DCF control or ability to hold head up   []Decreased RC/scapular/core strength and neuromuscular control    [x]Decreased UE functional strength   []other:      Functional Activity Limitations (from functional questionnaire and intake)   [x]Reduced ability to tolerate prolonged functional positions   []Reduced ability or difficulty with changes of positions or transfers between positions   [x]Reduced ability to maintain good posture and demonstrate good body mechanics with sitting, bending, and lifting   [x]

## 2018-01-30 NOTE — FLOWSHEET NOTE
TB ER/ IR     No Money/ HAB     Wing Arm Breathing. 2x10           Manual     Cervical Traction x4'    Cervical PA Grade-     Cervical Lateral Glide Grade-     Upper Trap Stretch     Levator Scapula Stretch     Thoracic PA Grade-3 x4'    2nd Rib Mob x3' Right Side                       Therapeutic Exercise and NMR EXR  [] (92858) Provided verbal/tactile cueing for activities related to strengthening, flexibility, endurance, ROM  for improvements in cervical, postural, scapular, scapulothoracic and UE control with self care, reaching, carrying, lifting, house/yardwork, driving/computer work.    [] (66494) Provided verbal/tactile cueing for activities related to improving balance, coordination, kinesthetic sense, posture, motor skill, proprioception  to assist with cervical, scapular, scapulothoracic and UE control with self care, reaching, carrying, lifting, house/yardwork, driving/computer work. Therapeutic Activities:    [] (01808 or 00420) Provided verbal/tactile cueing for activities related to improving balance, coordination, kinesthetic sense, posture, motor skill, proprioception and motor activation to allow for proper function of cervical, scapular, scapulothoracic and UE control with self care, carrying, lifting, driving/computer work.      Home Exercise Program:    [x] (46609) Reviewed/Progressed HEP activities related to strengthening, flexibility, endurance, ROM of cervical, scapular, scapulothoracic and UE control with self care, reaching, carrying, lifting, house/yardwork, driving/computer work  [] (15488) Reviewed/Progressed HEP activities related to improving balance, coordination, kinesthetic sense, posture, motor skill, proprioception of cervical, scapular, scapulothoracic and UE control with self care, reaching, carrying, lifting, house/yardwork, driving/computer work      Manual Treatments:    [x] (38739) Provided manual therapy to mobilize soft tissue/joints of cervical/CT, scapular GHJ and UE for the purpose of decreasing headache, modulating pain, promoting relaxation,  increasing ROM, reducing/eliminating soft tissue swelling/inflammation/restriction, improving soft tissue extensibility and allowing for proper ROM for normal function with self care, reaching, carrying, lifting, house/yardwork, driving/computer work    Modalities:  Traction 16#/7# 30\"/10\" x10'    Charges:  Timed Code Treatment Minutes: 25   Total Treatment Minutes: 55     [x] EVAL (LOW) 34307 (typically 20 minutes face-to-face)  [] EVAL (MOD) 53535 (typically 30 minutes face-to-face)  [] EVAL (HIGH) 85099 (typically 45 minutes face-to-face)  [] RE-EVAL     [x] TU(35460) x  1   [] IONTO  [] NMR (28020) x      [] VASO  [x] Manual (43523) x  1    [] Other:  [] TA x       [] Mech Traction (01599)  [] ES(attended) (80962)      [] ES (un) (77262):     GOALS:  1. Independent in HEP and progression per patient tolerance, in order to prevent re-injury. 2. Patient will have a decrease in pain to facilitate improvement in movement, function, and ADLs as indicated by Functional Deficits.     Long Term Goals: To be achieved in: 6 weeks  1. Disability index score of 15% or less for the NDI to assist with reaching prior level of function. 2. Patient will demonstrate increased AROM to [facility] of cervical/thoracic spine to allow for proper joint functioning as indicated by patients Functional Deficits. 3. Patient will demonstrate an increase in postural awareness and control and activation of  Deep cervical stabilizers to allow for proper functional mobility as indicated by patients Functional Deficits. 4. Patient will return to sleep thru the night functional activities without increased symptoms or restriction. 5. Patient will be able to sit for 2 hours without increased symptoms or restriction. Progression Towards Functional goals:  [] Patient is progressing as expected towards functional goals listed.     [] Progression is slowed due to

## 2018-02-01 ENCOUNTER — HOSPITAL ENCOUNTER (OUTPATIENT)
Dept: PHYSICAL THERAPY | Age: 63
Discharge: HOME | End: 2018-02-28
Attending: PHYSICAL MEDICINE & REHABILITATION | Admitting: PHYSICAL MEDICINE & REHABILITATION

## 2018-02-01 ENCOUNTER — HOSPITAL ENCOUNTER (OUTPATIENT)
Dept: PHYSICAL THERAPY | Age: 63
Discharge: HOME | End: 2018-02-02
Admitting: PHYSICAL MEDICINE & REHABILITATION

## 2018-02-01 NOTE — FLOWSHEET NOTE
work  Dry needling manual therapy: consisted on the placement of 6 needles in the following muscles:  upper trap, mid trap, rhomboid shante. A 40 mm needle was inserted, piston, rotated, and coned to produce intramuscular mobilization. These techniques were used to restore functional range of motion, reduce muscle spasm and induce healing in the corresponding musculature. (53811)  Clean Technique was utilized today while applying Dry needling treatment. The treatment sites where cleaned with 70% solution of  isopropyl alcohol . The PT washed their hands and utilized treatment gloves along with hand  prior to inserting the needles. All needles where removed and discarded in the appropriate sharps container. Modalities:  Traction 18#/7# 30\"/10\" x10'    Charges:  Timed Code Treatment Minutes: 45   Total Treatment Minutes: 55     [] EVAL (LOW) 90420 (typically 20 minutes face-to-face)  [] EVAL (MOD) 26652 (typically 30 minutes face-to-face)  [] EVAL (HIGH) 26799 (typically 45 minutes face-to-face)  [] RE-EVAL     [x] VM(33472) x  1   [] IONTO  [] NMR (86465) x      [] VASO  [x] Manual (11544) x  2    [] Other:  [] TA x       [x] Mech Traction (37518)  [] ES(attended) (31958)      [] ES (un) (59886):      GOALS:  1. Independent in HEP and progression per patient tolerance, in order to prevent re-injury. 2. Patient will have a decrease in pain to facilitate improvement in movement, function, and ADLs as indicated by Functional Deficits.     Long Term Goals: To be achieved in: 6 weeks  1. Disability index score of 15% or less for the NDI to assist with reaching prior level of function. 2. Patient will demonstrate increased AROM to [facility] of cervical/thoracic spine to allow for proper joint functioning as indicated by patients Functional Deficits.    3. Patient will demonstrate an increase in postural awareness and control and activation of  Deep cervical stabilizers to allow for proper functional

## 2018-02-08 ENCOUNTER — HOSPITAL ENCOUNTER (OUTPATIENT)
Dept: PHYSICAL THERAPY | Age: 63
Discharge: HOME | End: 2018-02-09
Admitting: PHYSICAL MEDICINE & REHABILITATION

## 2018-02-16 ENCOUNTER — HOSPITAL ENCOUNTER (OUTPATIENT)
Dept: PHYSICAL THERAPY | Age: 63
Discharge: HOME | End: 2018-02-17
Admitting: PHYSICAL MEDICINE & REHABILITATION

## 2018-02-16 NOTE — FLOWSHEET NOTE
[facility], [place]    Physical Therapy Daily Treatment Note  Date:  2018    Patient Name:  Sola Pitts    :  1955  MRN: [id number]  Restrictions/Precautions:    Medical/Treatment Diagnosis Information:  · Diagnosis: M47.812 (ICD-10-CM) - Spondylosis of cervical region without myelopathy or radiculopathy, M79.1 (ICD-10-CM) - Myofacial muscle pain  · Treatment Diagnosis: Neck Pain   Insurance/Certification information:  PT Insurance Information: UMR- No Auth  Physician Information:  Referring Practitioner: Dr. Shelton Simental of care signed (Y/N):     Date of Patient follow up with Physician:     G-Code (if applicable):      Date G-Code Applied:         Progress Note: [x]  Yes  []  No  Next due by: Visit #10      Latex Allergy:  [x]NO      []YES  Preferred Language for Healthcare:   [x]English       []other:    Visit # Insurance Allowable   4 60     Pain level:  3/10     SUBJECTIVE:  Felt longer relief of pain after last session. OBJECTIVE: See eval  Observation:   Test measurements:      RESTRICTIONS/PRECAUTIONS: OA and HTN. Initiate DN NV is applicable. Exercises/Interventions: Script-3/13/18  Exercise/Equipment Resistance/Repetitions Other/Last Progression          Stretching     Upper Trap Stretch 4x15\" each    Levator Scapula Stretch     Scalene Belt Stretch     Bear Hug Stretch  15\"x5    Thoracic Ext in [address]  15x5\"    Corner  10x10\"               Exercises     Chin Tucks Supine 15x5\"    Headlifts 15x    SA Punch      Prone Row/Ext/ HAB/ Scap     TB Rows/ Ext     TB ER/ IR     No Money/ HAB Red 20x    Wing Arm Breathing.   2x10           Manual     Cervical Traction x3'    Cervical PA Grade-     Cervical Lateral Glide Grade-3 x5'    Upper Trap Stretch     Levator Scapula Stretch                                  Therapeutic Exercise and NMR EXR  [x] (78593) Provided verbal/tactile cueing for activities related to strengthening, flexibility, endurance, ROM  for improvements in cervical, postural, scapular, scapulothoracic and UE control with self care, reaching, carrying, lifting, house/yardwork, driving/computer work.    [] (95936) Provided verbal/tactile cueing for activities related to improving balance, coordination, kinesthetic sense, posture, motor skill, proprioception  to assist with cervical, scapular, scapulothoracic and UE control with self care, reaching, carrying, lifting, house/yardwork, driving/computer work. Therapeutic Activities:    [] (60574 or 32456) Provided verbal/tactile cueing for activities related to improving balance, coordination, kinesthetic sense, posture, motor skill, proprioception and motor activation to allow for proper function of cervical, scapular, scapulothoracic and UE control with self care, carrying, lifting, driving/computer work.      Home Exercise Program:    [x] (20370) Reviewed/Progressed HEP activities related to strengthening, flexibility, endurance, ROM of cervical, scapular, scapulothoracic and UE control with self care, reaching, carrying, lifting, house/yardwork, driving/computer work  [] (62134) Reviewed/Progressed HEP activities related to improving balance, coordination, kinesthetic sense, posture, motor skill, proprioception of cervical, scapular, scapulothoracic and UE control with self care, reaching, carrying, lifting, house/yardwork, driving/computer work      Manual Treatments:    [x] (04662) Provided manual therapy to mobilize soft tissue/joints of cervical/CT, scapular GHJ and UE for the purpose of decreasing headache, modulating pain, promoting relaxation,  increasing ROM, reducing/eliminating soft tissue swelling/inflammation/restriction, improving soft tissue extensibility and allowing for proper ROM for normal function with self care, reaching, carrying, lifting, house/yardwork, driving/computer work  Dry needling manual therapy: consisted on the placement of 6 needles in the following

## 2018-02-22 ENCOUNTER — HOSPITAL ENCOUNTER (OUTPATIENT)
Dept: PHYSICAL THERAPY | Age: 63
Discharge: HOME | End: 2018-02-23
Admitting: PHYSICAL MEDICINE & REHABILITATION

## 2018-02-22 NOTE — FLOWSHEET NOTE
activities without increased symptoms or restriction. 5. Patient will be able to sit for 2 hours without increased symptoms or restriction. Progression Towards Functional goals:  [x] Patient is progressing as expected towards functional goals listed. [] Progression is slowed due to complexities listed. [] Progression has been slowed due to co-morbidities. [] Plan just implemented, too soon to assess goals progression  [] Other:     ASSESSMENT:  Decreased trigger points today. Treatment/Activity Tolerance:  [x] Patient tolerated treatment well [] Patient limited by fatique  [] Patient limited by pain  [] Patient limited by other medical complications  [] Other:     Prognosis: [x] Good [] Fair  [] Poor    Patient Requires Follow-up: [x] Yes  [] No  Plan for Next Session:    PLAN: See eval  [x] Continue per plan of care [] Alter current plan (see comments)  [] Plan of care initiated [] Hold pending MD visit [] Discharge    *If patient does not return for further follow ups after this date. Please consider this as the patients discharge from physical therapy.      Electronically signed by: Carmen Cristina 1

## 2018-03-01 ENCOUNTER — OFFICE VISIT (OUTPATIENT)
Dept: ORTHOPEDIC SURGERY | Age: 63
End: 2018-03-01

## 2018-03-01 ENCOUNTER — HOSPITAL ENCOUNTER (OUTPATIENT)
Dept: PHYSICAL THERAPY | Age: 63
Discharge: HOME | End: 2018-03-31
Attending: PHYSICAL MEDICINE & REHABILITATION | Admitting: PHYSICAL MEDICINE & REHABILITATION

## 2018-03-01 VITALS
BODY MASS INDEX: 27.26 KG/M2 | SYSTOLIC BLOOD PRESSURE: 123 MMHG | WEIGHT: 179.9 LBS | DIASTOLIC BLOOD PRESSURE: 76 MMHG | HEIGHT: 68 IN

## 2018-03-01 DIAGNOSIS — M47.812 CERVICAL SPONDYLOSIS WITHOUT MYELOPATHY: ICD-10-CM

## 2018-03-01 DIAGNOSIS — M25.552 LEFT HIP PAIN: Primary | ICD-10-CM

## 2018-03-01 DIAGNOSIS — Z96.642 HISTORY OF LEFT HIP REPLACEMENT: ICD-10-CM

## 2018-03-01 PROCEDURE — 99213 OFFICE O/P EST LOW 20 MIN: CPT | Performed by: PHYSICAL MEDICINE & REHABILITATION

## 2018-03-01 NOTE — PROGRESS NOTES
Past Medical History:   Diagnosis Date    Allergic rhinitis     environmental    Hypertension     Kidney stones     had to have lithotripsy    Nosebleeds     Palpitations 2010    frequent PVC's, couplets- seen Cardiologist x 2 in past ? name, improved in last couple years per pt       Past Surgical History:     Past Surgical History:   Procedure Laterality Date    COLONOSCOPY  2009    Dr. Karlos Khanna, normal per pt     HERNIA REPAIR      right inguinal     JOINT REPLACEMENT  12/2007    hip replacement- Left     KNEE ARTHROSCOPY  12/2006    LITHOTRIPSY      d/t kidney stones- Dr. Gm Ring  1980's    TONSILLECTOMY  childhood     Current Medications:     Current Outpatient Prescriptions:     traMADol (ULTRAM) 50 MG tablet, Take 50 mg by mouth every 6 hours as needed for Pain., Disp: , Rfl:     lisinopril (PRINIVIL;ZESTRIL) 20 MG tablet, Take 1 tablet by mouth daily, Disp: 90 tablet, Rfl: 3    diclofenac (VOLTAREN) 75 MG EC tablet, Take 1 tablet by mouth 2 times daily, Disp: 60 tablet, Rfl: 3    oxyCODONE-acetaminophen (PERCOCET) 5-325 MG per tablet, Take by mouth ., Disp: , Rfl:     vitamin D (CHOLECALCIFEROL) 1000 UNIT TABS tablet, Take 1,000 Units by mouth daily, Disp: , Rfl:     magnesium gluconate (MAGONATE) 500 MG tablet, Take 500 mg by mouth 2 times daily, Disp: , Rfl:     Glucosamine-Chondroit-Vit C-Mn (GLUCOSAMINE 1500 COMPLEX PO), Take by mouth, Disp: , Rfl:     gabapentin (NEURONTIN) 100 MG capsule, TK 1 C PO QID, Disp: , Rfl: 2    ibuprofen (ADVIL;MOTRIN) 200 MG tablet, Take 400 mg by mouth every 6 hours as needed for Pain., Disp: , Rfl:     Omega-3 Fatty Acids (FISH OIL) 1000 MG CAPS, Take 1,000 mg by mouth daily. , Disp: , Rfl:     aspirin 81 MG tablet, Take 81 mg by mouth daily. , Disp: , Rfl:     therapeutic multivitamin-minerals (THERAGRAN-M) tablet, Take 1 tablet by mouth daily. , Disp: , Rfl:   Allergies:  Patient has no known allergies.   Social History:    reports that he quit smoking about 9 years ago. He smoked 0.50 packs per day. He has never used smokeless tobacco. He reports that he drinks alcohol. He reports that he does not use drugs. Family History:   Family History   Problem Relation Age of Onset    High Blood Pressure Mother     Heart Disease Father        REVIEW OF SYSTEMS:   CONSTITUTIONAL: Denies unexplained weight loss, fevers, chills or fatigue  NEUROLOGICAL: Denies unsteady gait or progressive weakness  MUSCULOSKELETAL: Denies joint swelling or redness  GI: Denies nausea, vomiting, diarrhea   : Denies bowel or bladder issues       PHYSICAL EXAM:    Vitals: Blood pressure 123/76, height 5' 7.99\" (1.727 m), weight 179 lb 14.3 oz (81.6 kg). GENERAL EXAM:  · General Apparence: Patient is adequately groomed with no evidence of malnutrition. · Psychiatric: Orientation: The patient is oriented to time, place and person. The patient's mood and affect are appropriate   · Vascular: Examination reveals no swelling and palpation reveals no tenderness in upper or lower extremities. Good capillary refill. · The lymphatic examination of the neck, axillae and groin reveals all areas to be without enlargement or induration  · Sensation is intact without deficit in the upper and lower extremities to light touch and pinprick  · Coordination of the upper and lower extremities are normal.    CERVICAL EXAMINATION:  · Inspection: Local inspection shows no step-off or bruising. Cervical alignment is normal. No instability is noted. · Palpation and Percussion: No evidence of tenderness at the midline, and trapezius. Paraspinal tenderness is not present. There is no paraspinal spasm. · Range of Motion:  limited by 25% in all planes due to pain   · Strength: 5/5 bilateral upper extremities  · Skin:There are no rashes, ulcerations or lesions in right & left upper extremities. · Reflexes: Bilaterally triceps, biceps and brachioradialis are 1+.   Clonus

## 2018-03-02 ENCOUNTER — HOSPITAL ENCOUNTER (OUTPATIENT)
Dept: OTHER | Age: 63
Discharge: HOME | End: 2018-03-02
Attending: PHYSICIAN ASSISTANT | Admitting: PHYSICIAN ASSISTANT

## 2018-03-02 ENCOUNTER — OFFICE VISIT (OUTPATIENT)
Dept: ORTHOPEDIC SURGERY | Age: 63
End: 2018-03-02

## 2018-03-02 DIAGNOSIS — R52 PAIN: ICD-10-CM

## 2018-03-02 DIAGNOSIS — Z96.642 HISTORY OF TOTAL HIP REPLACEMENT, LEFT: ICD-10-CM

## 2018-03-02 DIAGNOSIS — M25.552 LEFT HIP PAIN: Primary | ICD-10-CM

## 2018-03-02 LAB
BASOPHILS ABSOLUTE: 0.1 K/UL (ref 0–0.2)
BASOPHILS RELATIVE PERCENT: 1 %
C-REACTIVE PROTEIN: <0.3 MG/L (ref 0–5.1)
EOSINOPHILS ABSOLUTE: 0.2 K/UL (ref 0–0.6)
EOSINOPHILS RELATIVE PERCENT: 2.4 %
HCT VFR BLD CALC: 48.4 % (ref 40.5–52.5)
HEMOGLOBIN: 16.4 G/DL (ref 13.5–17.5)
LYMPHOCYTES ABSOLUTE: 2 K/UL (ref 1–5.1)
LYMPHOCYTES RELATIVE PERCENT: 24 %
MCH RBC QN AUTO: 31.6 PG (ref 26–34)
MCHC RBC AUTO-ENTMCNC: 34 G/DL (ref 31–36)
MCV RBC AUTO: 92.9 FL (ref 80–100)
MONOCYTES ABSOLUTE: 0.7 K/UL (ref 0–1.3)
MONOCYTES RELATIVE PERCENT: 8.4 %
NEUTROPHILS ABSOLUTE: 5.4 K/UL (ref 1.7–7.7)
NEUTROPHILS RELATIVE PERCENT: 64.2 %
PDW BLD-RTO: 13.1 % (ref 12.4–15.4)
PLATELET # BLD: 332 K/UL (ref 135–450)
PMV BLD AUTO: 6.7 FL (ref 5–10.5)
RBC # BLD: 5.21 M/UL (ref 4.2–5.9)
SEDIMENTATION RATE, ERYTHROCYTE: 8 MM/HR (ref 0–20)
WBC # BLD: 8.4 K/UL (ref 4–11)

## 2018-03-02 PROCEDURE — 99213 OFFICE O/P EST LOW 20 MIN: CPT | Performed by: PHYSICIAN ASSISTANT

## 2018-03-03 ENCOUNTER — TELEPHONE (OUTPATIENT)
Dept: ORTHOPEDIC SURGERY | Age: 63
End: 2018-03-03

## 2018-03-03 RX ORDER — PREDNISONE 10 MG/1
TABLET ORAL
Qty: 26 TABLET | Refills: 0 | Status: SHIPPED | OUTPATIENT
Start: 2018-03-03 | End: 2018-07-27 | Stop reason: ALTCHOICE

## 2018-03-06 ENCOUNTER — OFFICE VISIT (OUTPATIENT)
Dept: ORTHOPEDIC SURGERY | Age: 63
End: 2018-03-06

## 2018-03-06 VITALS
HEIGHT: 68 IN | SYSTOLIC BLOOD PRESSURE: 123 MMHG | DIASTOLIC BLOOD PRESSURE: 76 MMHG | BODY MASS INDEX: 27.26 KG/M2 | WEIGHT: 179.9 LBS

## 2018-03-06 DIAGNOSIS — M51.26 HNP (HERNIATED NUCLEUS PULPOSUS), LUMBAR: Primary | ICD-10-CM

## 2018-03-06 DIAGNOSIS — M54.16 LUMBAR RADICULOPATHY: ICD-10-CM

## 2018-03-06 PROCEDURE — 99214 OFFICE O/P EST MOD 30 MIN: CPT | Performed by: PHYSICAL MEDICINE & REHABILITATION

## 2018-03-06 RX ORDER — TRAMADOL HYDROCHLORIDE 50 MG/1
50 TABLET ORAL 3 TIMES DAILY PRN
Qty: 21 TABLET | Refills: 0 | Status: SHIPPED | OUTPATIENT
Start: 2018-03-06 | End: 2018-03-13

## 2018-03-06 NOTE — PROGRESS NOTES
2009    Dr. Meena Solano, normal per pt     HERNIA REPAIR      right inguinal     JOINT REPLACEMENT  12/2007    hip replacement- Left     KNEE ARTHROSCOPY  12/2006    LITHOTRIPSY      d/t kidney stones- Dr. Jerry Cazares  1980's    TONSILLECTOMY  childhood     Current Medications:     Current Outpatient Prescriptions:     traMADol (ULTRAM) 50 MG tablet, Take 1 tablet by mouth 3 times daily as needed for Pain for up to 7 days. , Disp: 21 tablet, Rfl: 0    predniSONE (DELTASONE) 10 MG tablet, SIG: iii po BID x 2 days then ii po BID x 2 days then i po BID x 2 days then i po qd x 2 days, Disp: 26 tablet, Rfl: 0    traMADol (ULTRAM) 50 MG tablet, Take 50 mg by mouth every 6 hours as needed for Pain., Disp: , Rfl:     lisinopril (PRINIVIL;ZESTRIL) 20 MG tablet, Take 1 tablet by mouth daily, Disp: 90 tablet, Rfl: 3    diclofenac (VOLTAREN) 75 MG EC tablet, Take 1 tablet by mouth 2 times daily, Disp: 60 tablet, Rfl: 3    oxyCODONE-acetaminophen (PERCOCET) 5-325 MG per tablet, Take by mouth ., Disp: , Rfl:     vitamin D (CHOLECALCIFEROL) 1000 UNIT TABS tablet, Take 1,000 Units by mouth daily, Disp: , Rfl:     magnesium gluconate (MAGONATE) 500 MG tablet, Take 500 mg by mouth 2 times daily, Disp: , Rfl:     Glucosamine-Chondroit-Vit C-Mn (GLUCOSAMINE 1500 COMPLEX PO), Take by mouth, Disp: , Rfl:     gabapentin (NEURONTIN) 100 MG capsule, TK 1 C PO QID, Disp: , Rfl: 2    ibuprofen (ADVIL;MOTRIN) 200 MG tablet, Take 400 mg by mouth every 6 hours as needed for Pain., Disp: , Rfl:     Omega-3 Fatty Acids (FISH OIL) 1000 MG CAPS, Take 1,000 mg by mouth daily. , Disp: , Rfl:     aspirin 81 MG tablet, Take 81 mg by mouth daily. , Disp: , Rfl:     therapeutic multivitamin-minerals (THERAGRAN-M) tablet, Take 1 tablet by mouth daily. , Disp: , Rfl:   Allergies:  Patient has no known allergies. Social History:    reports that he quit smoking about 9 years ago. He smoked 0.50 packs per day.  He Further medications will require more testing. Informed verbal consent was obtained. Goals of the current treatment regimen include: improvement in pain, improvement in overall in physical performance, ability to perform daily activities, work or disability, emotional distress, health care utilization and decreased medication consumption. Progress will be monitored towards achieving/maintaining therapeutic goals:    1. Improving perceived interference of pain with ADL's, ability to perform HEP, continue to improve in overall flexibility, ROM, strength and endurance, ability to do household activities indoor and/or outdoor, work and social/leisure activities. Improve psychosocial and physical functioning. 2. Improving sleep to 6-7 hours a night. Improve mood/ anxiety and depression symptoms    3. Reduction of reliance on opioid analgesia/more appropriate opioid use. Utilization of adjuvant medications as appropriate. Risks and benefits of the medications and alternative treatments have been discussed with the patient. The patient was advised against drinking alcohol with the opioid pain medicines, advised against driving or handling machinery when starting or adjusting the dose of medicines, feeling groggy or drowsy, or if having any cognitive issues related to the current medications. The patient is fully aware of the risk of overdose and death, if medicines are misused and not taken as prescribed. If the patient develops new symptoms or if the symptoms worsen, the patient was told to call the office. 2. PT:  Will start PT for the lumbar spine  3. Further studies:  No further studies. 4. Interventional:  No interventional options at this point  5. Follow up:  4-6 weeks          Dayday Moore MD, DEVIN, [facility]  Board Certified in [address] [place] [address]  Certified and Fellowship Trained in [facility]) This dictation was performed with a verbal recognition program (DRAGON) and it was checked for errors. It is possible that there are still dictated errors within this office note. If so, please bring any errors to my attention for an addendum. All efforts were made to ensure that this office note is accurate.

## 2018-03-08 ENCOUNTER — HOSPITAL ENCOUNTER (OUTPATIENT)
Dept: NUCLEAR MEDICINE | Age: 63
Discharge: HOME | End: 2018-03-08
Admitting: ORTHOPAEDIC SURGERY

## 2018-03-08 DIAGNOSIS — Z96.642 PRESENCE OF LEFT ARTIFICIAL HIP JOINT: ICD-10-CM

## 2018-03-08 DIAGNOSIS — R52 PAIN: ICD-10-CM

## 2018-03-08 DIAGNOSIS — Z96.642 HISTORY OF TOTAL HIP REPLACEMENT, LEFT: ICD-10-CM

## 2018-03-08 DIAGNOSIS — M25.552 LEFT HIP PAIN: ICD-10-CM

## 2018-03-08 RX ORDER — TC 99M MEDRONATE 20 MG/10ML
25 INJECTION, POWDER, LYOPHILIZED, FOR SOLUTION INTRAVENOUS
Status: COMPLETED | OUTPATIENT
Start: 2018-03-08 | End: 2018-03-08

## 2018-03-08 RX ADMIN — TC 99M MEDRONATE 25 MILLICURIE: 20 INJECTION, POWDER, LYOPHILIZED, FOR SOLUTION INTRAVENOUS at 11:47

## 2018-03-09 ENCOUNTER — HOSPITAL ENCOUNTER (OUTPATIENT)
Dept: PHYSICAL THERAPY | Age: 63
Discharge: HOME | End: 2018-03-10

## 2018-03-09 NOTE — PLAN OF CARE
17-18 seconds (At least 40% but less than 60% functional limitation)  [x]   19-20 seconds (At least 60% but less than 80% functional limitation)  []   21-22 seconds (At least 80% but less than 100% functional limitation)  []   23 seconds or slower (100% functional limitation)    Non-organic signs:  []   Skin folds are tender to light pinching over a widespread area. []   Bony tenderness over a widespread area   []   Axial loading produces more spine pain   []   Trunk rotation produces more spine pain   []    Marked difference between formal active extension and extension on distraction   []    Marked difference between formal active rotation and rotation on distraction   []   Widespread myotomal weakness that does not fit a neuroanatomic pattern. Jerky/giving way weakness   []    Patients reports diminished sensation in a pattern that does not correspond to a dermatome of a nerve root or peripheral nerve.   []   Examiner interpreted the reaction of the patient as overreacting during the examination. Orthopedic Special Tests:   Special Tests/Upper and Lower Quarter Screen  - Clonus  - [place]'s sign  - SLR test  - Crossed SLR test  - Albright  - Sciatic nerve tension test  - Femoral nerve tension test  - PATRICIA test                       [x] Patient history, allergies, meds reviewed. Medical chart reviewed. See intake form. Review Of Systems (ROS):  [x]Performed Review of systems (Integumentary, CardioPulmonary, Neurological) by intake and observation. Intake form has been scanned into medical record. Patient has been instructed to contact their primary care physician regarding ROS issues if not already being addressed at this time. Cognitive, Communication, Behavior and Learning:  - The patient was alert, spoke coherently and was oriented to person, place and time.   - Demonstrated no barriers to communication or processing information.  - Appeared literate, spoke [place] and had no significant hearing type 1(E10.65) or 2 (E11.65)   []Neuropathy (G60.9)     Pulmonary conditions   []Asthma (J45)  []Coughing   []COPD (J44.9)   Psychological Disorders  []Anxiety (F41.9)  []Depression (F32.9)   []Other:   []Other:            PLAN: Begin PT focusing on: proximal hip mobilization, Lumbar mobilization, trunk motor control, proximal hip motor control, and HEP    Frequency/Duration:  1-2 days per week for 6 Weeks:  Interventions:  1. Therapeutic exercise including: strength training, ROM/flexibility, NMR and proprioception for the proximal hip, trunk and lower extremity. 2. Manual therapy as indicated including Dry Needling/IASTM, STM, PROM, Gr I-IV mobilizations, spinal mobilization/manipulation. 3. Modalities as needed including: thermal agents, E-stim, US, iontophoresis as indicated. 4. Patient education on spine protection, activity modification, progression of HEP. HEP instruction: (see scanned forms)    GOALS:  Patient stated goal: reduce pain so I can get back to work and exercise    Therapist goals for Patient:   Short Term Goals: To be achieved in: 2 weeks  - Independent in HEP and progression per patient tolerance, in order to prevent re-injury. - Patient will have a decrease in pain to facilitate improvement in movement, function, and ADLs as indicated by Functional Deficits. Long Term Goals: To be achieved in: 6 weeks  - The patient is expected to demonstrate less than 20% impairment, limitation or restriction in:  - changing and maintaining body position  - Patient will demonstrate an increase in functional strength to allow for proper functional mobility as indicated by patients Functional Deficits. - Patient will return to all desired, higher level functional activities without increased symptoms or restriction.        Electronically signed by:  Shivani Tamez, PT, DPT, ATC

## 2018-03-09 NOTE — FLOWSHEET NOTE
manipulation            Bridge      Flexion SLR      Prone SLR's      Sidelying Abduction SLR      Sidelying Clam shells      Supine Clam Shells against resistance            Bridge with single knee extension      Side Plank      Prone Plank      Lumbar rotation 5\" x5 B     Single knee to chest      Double knee to chest 10x10\"     Hand Knee Rock/child's pose x10     Prone on elbows      Prone press up      Standing Extension      Pelvic tilts 15x5\"       [x] Provided education in neutral spine position as it relates to the functional positions of  [x] sitting,    [x] standing   [] bending. [] Provided verbal cueing for self stretching of:   [] Hamstrings   [] Piriformis    [] Figure 4 technique    [] Knee to opposite shoulder technique   [] Quad   [] Hip Flexors  [x] Provided education in multimodal approach to treatment to include proper dosage of postural awareness, body mechanics awareness, hip flexibility, lumbopelvic stabilization, spinal ROM, manual therapy and modalities. [x]  Provided education in selective activation of the following:   [x]TA (abdominal drawing in maneuver)   []Multifidus (prone verbal cueing)    Therapeutic Exercise and NMR EXR  [x] (35734) Provided verbal/tactile cueing for activities related to strengthening, flexibility, endurance, ROM  for improvements in proximal hip and core control with self care, mobility, lifting and ambulation.  [] (57415) Provided verbal/tactile cueing for activities related to improving balance, coordination, kinesthetic sense, posture, motor skill, proprioception  to assist with core control in self care, mobility, lifting, and ambulation.      Therapeutic Activities:    [] (32988 or 95479) Provided verbal/tactile cueing for activities related to improving balance, coordination, kinesthetic sense, posture, motor skill, proprioception and motor activation to allow for proper function  with self care and ADLs  [] (17335) Provided training and instruction to the patient for proper core and proximal hip recruitment and positioning with ambulation re-education     Home Exercise Program:    [x] (75154) Reviewed/Progressed HEP activities related to strengthening, flexibility, endurance, ROM of core, proximal hip and LE for functional self-care, mobility, lifting and ambulation   [] (82741) Reviewed/Progressed HEP activities related to improving balance, coordination, kinesthetic sense, posture, motor skill, proprioception of core, proximal hip and LE for self care, mobility, lifting, and ambulation      Manual Treatments:  PROM / STM / Oscillations-Mobs:  G-I, II, III, IV (PA's, Inf., Post.)  [x] (02241) Provided manual therapy to mobilize proximal hip and LS spine soft tissue/joints for the purpose of modulating pain, promoting relaxation,  increasing ROM, reducing/eliminating soft tissue swelling/inflammation/restriction, improving soft tissue extensibility and allowing for proper ROM for normal function with self care, mobility, lifting and ambulation. Modalities:   Hi-volt and cold pack 15'    Charges:  Timed Code Treatment Minutes: 25'   Total Treatment Minutes: 61'     [x] EVAL  [x] ODOM(22850) x  1   [] IONTO  [] NMR (82446) x      [] VASO  [x] Manual (25272) x  1    [] Other:  [] TA x       [] Mech Traction (87050)  [] ES(attended) (43838)      [x] ES (un) (35812):     GOALS:  Patient stated goal: reduce pain so I can get back to work and exercise     Therapist goals for Patient:   Short Term Goals: To be achieved in: 2 weeks  - Independent in HEP and progression per patient tolerance, in order to prevent re-injury. - Patient will have a decrease in pain to facilitate improvement in movement, function, and ADLs as indicated by Functional Deficits. Long Term Goals:  To be achieved in: 6 weeks  - The patient is expected to demonstrate less than 20% impairment, limitation or restriction in:  - changing and maintaining body position  - Patient will demonstrate an increase in functional strength to allow for proper functional mobility as indicated by patients Functional Deficits. - Patient will return to all desired, higher level functional activities without increased symptoms or restriction. Progression Towards Functional goals:  [] Patient is progressing as expected towards functional goals listed. [] Progression is slowed due to complexities listed. [] Progression has been slowed due to co-morbidities. [x] Plan just implemented, too soon to assess goals progression  [] Other:     ASSESSMENT:  See eval    Treatment/Activity Tolerance:  [] Patient tolerated treatment well [] Patient limited by fatique  [x] Patient limited by pain  [] Patient limited by other medical complications  [] Other:     Prognosis: [x] Good [] Fair  [] Poor    Patient Requires Follow-up: [x] Yes  [] No    PLAN: See eval  [] Continue per plan of care [] Alter current plan (see comments)  [x] Plan of care initiated [] Hold pending MD visit [] Discharge    Plan Moving Forward:  Initiate and continue a program geared toward manual therapy, posture and body mechanics training, lower extremity flexibility and lumbopelvic stabilization with a graded progression toward more upright and functional positions.     Electronically signed by: Camron Vivas PT, DPT, ATC

## 2018-03-13 ENCOUNTER — HOSPITAL ENCOUNTER (OUTPATIENT)
Dept: PHYSICAL THERAPY | Age: 63
Discharge: HOME | End: 2018-03-14
Admitting: PHYSICAL MEDICINE & REHABILITATION

## 2018-03-15 ENCOUNTER — OFFICE VISIT (OUTPATIENT)
Dept: ORTHOPEDIC SURGERY | Age: 63
End: 2018-03-15

## 2018-03-15 VITALS — HEIGHT: 68 IN | BODY MASS INDEX: 27.26 KG/M2 | WEIGHT: 179.9 LBS

## 2018-03-15 DIAGNOSIS — M25.852 LEFT HIP IMPINGEMENT SYNDROME: Primary | ICD-10-CM

## 2018-03-15 DIAGNOSIS — Z96.642 H/O TOTAL HIP ARTHROPLASTY, LEFT: ICD-10-CM

## 2018-03-15 PROCEDURE — 99213 OFFICE O/P EST LOW 20 MIN: CPT | Performed by: ORTHOPAEDIC SURGERY

## 2018-03-16 ENCOUNTER — HOSPITAL ENCOUNTER (OUTPATIENT)
Dept: PHYSICAL THERAPY | Age: 63
Discharge: HOME | End: 2018-03-17

## 2018-03-16 NOTE — FLOWSHEET NOTE
The [address] [place] [address] and Sports Rehabilitation, [facility]  [address], [place], [address]         Phone: [phone]   Fax:     [phone]      Physical Therapy Daily Treatment Note  Date:  3/16/2018    Patient Name:  Tasia Roman    :  1955  MRN: [id number]  Restrictions/Precautions:    Medical/Treatment Diagnosis Information:  Diagnosis: M51.26 HNP, lumbar; M54.16 lumbar radiculopathy  Treatment Diagnosis: LBP  Insurance/Certification information:  PT Insurance Information: UMR  Physician Information:  Referring Practitioner: Stacy Montes MD  Plan of care signed (Y/N):     Date of Patient follow up with Physician:     G-Code (if applicable):      Date G-Code Applied:    PT G-Codes  Functional Assessment Tool Used: Modified Oswestry  Score: 60% disability  Functional Limitation: Changing and maintaining body position  Changing and Maintaining Body Position Current Status (): At least 60 percent but less than 80 percent impaired, limited or restricted  Changing and Maintaining Body Position Goal Status (): At least 1 percent but less than 20 percent impaired, limited or restricted    Progress Note: []  Yes  []  No  Next due by: Visit #10      Latex Allergy:  [x]NO      []YES  Preferred Language for Healthcare:   [x]English       []other:    Visit # Insurance Allowable   3 Pending. .. Pain level:  3/10     SUBJECTIVE:  Patient reports there was a break in his pain yesterday. This morning his pain was at an 8-9/10. The morning and evenings continue to be the worst. He also reports his hip has been cleared.     OBJECTIVE: See eval  Observation:   Test measurements:      RESTRICTIONS/PRECAUTIONS: HTN, L FATOU    Exercises/Interventions:      Exercises/Interventions:  intervention reps sets notes   Manual traction in hooklyin using Hip Belt 10'     Manual hamstring stretch - on L, hip at 90  3x30\"  manual   Manual piriformis stretch      Manual prone quad skill, proprioception and motor activation to allow for proper function  with self care and ADLs  [] (67527) Provided training and instruction to the patient for proper core and proximal hip recruitment and positioning with ambulation re-education     Home Exercise Program:    [x] (91663) Reviewed/Progressed HEP activities related to strengthening, flexibility, endurance, ROM of core, proximal hip and LE for functional self-care, mobility, lifting and ambulation   [] (10314) Reviewed/Progressed HEP activities related to improving balance, coordination, kinesthetic sense, posture, motor skill, proprioception of core, proximal hip and LE for self care, mobility, lifting, and ambulation      Manual Treatments:  PROM / STM / Oscillations-Mobs:  G-I, II, III, IV (PA's, Inf., Post.)  [x] (66272) Provided manual therapy to mobilize proximal hip and LS spine soft tissue/joints for the purpose of modulating pain, promoting relaxation,  increasing ROM, reducing/eliminating soft tissue swelling/inflammation/restriction, improving soft tissue extensibility and allowing for proper ROM for normal function with self care, mobility, lifting and ambulation. Modalities:   Hi-volt and cold pack 15'     Charges:  Timed Code Treatment Minutes: 39'   Total Treatment Minutes: 61'     [] EVAL  [x] AX(27822) x  2   [] IONTO  [] NMR (81814) x      [] VASO  [x] Manual (70714) x  1    [] Other:  [] TA x       [] Mech Traction (19696)  [] ES(attended) (95288)      [x] ES (un) (28237):     GOALS:  Patient stated goal: reduce pain so I can get back to work and exercise     Therapist goals for Patient:   Short Term Goals: To be achieved in: 2 weeks  - Independent in HEP and progression per patient tolerance, in order to prevent re-injury. - Patient will have a decrease in pain to facilitate improvement in movement, function, and ADLs as indicated by Functional Deficits. Long Term Goals:  To be achieved in: 6 weeks  - The patient is

## 2018-03-19 ENCOUNTER — OFFICE VISIT (OUTPATIENT)
Dept: ORTHOPEDIC SURGERY | Age: 63
End: 2018-03-19

## 2018-03-19 VITALS
BODY MASS INDEX: 26.98 KG/M2 | WEIGHT: 178 LBS | HEIGHT: 68 IN | DIASTOLIC BLOOD PRESSURE: 75 MMHG | SYSTOLIC BLOOD PRESSURE: 127 MMHG

## 2018-03-19 DIAGNOSIS — M54.16 LUMBAR RADICULOPATHY: ICD-10-CM

## 2018-03-19 DIAGNOSIS — M51.26 LUMBAR HERNIATED DISC: Primary | ICD-10-CM

## 2018-03-19 PROCEDURE — 99213 OFFICE O/P EST LOW 20 MIN: CPT | Performed by: PHYSICAL MEDICINE & REHABILITATION

## 2018-03-19 RX ORDER — TRAMADOL HYDROCHLORIDE 50 MG/1
50 TABLET ORAL EVERY 8 HOURS PRN
Qty: 21 TABLET | Refills: 0 | Status: SHIPPED | OUTPATIENT
Start: 2018-03-19 | End: 2018-03-26

## 2018-03-19 NOTE — PROGRESS NOTES
Follow up: SPINE      CHIEF COMPLAINT:    Chief Complaint   Patient presents with    Lower Back Pain     f/u PT, states he has noticed some improvement since starting physical therapy, he continues to have pain at night and in the AM    Medication Refill     Tramadol 50mg, 1 PO TID #21, Last Filled: 03/06/18, Last Dose: 03/19/18 @ 6:30am       HISTORY OF PRESENT ILLNESS:        The patient is a 60 y.o. male whom reports he returns with continued low back pain and left leg pain. Reports improvement overall though in his pain. He is completed about 2 weeks of physical therapy. He is currently taking tramadol one to one half times per day. This brings his pain from an 8 out of 10 to a 4 out of 10. This allows him to complete physical therapy. He denies having any side effects. He continues be independent with his ADLs.       Pain Assessment  Location of Pain: Back  Location Modifiers: Posterior  Severity of Pain: 3  Quality of Pain: Aching  Duration of Pain: Persistent  Frequency of Pain: Intermittent  Aggravating Factors:  (Laying down at night / Worse in the AM)  Limiting Behavior: Some  Relieving Factors:  (Tramadol)  Result of Injury: No  Work-Related Injury: No  Are there other pain locations you wish to document?: No    Associated signs and symptoms:   Neurogenic bowel or bladder symptoms:  no   Perceived weakness:  no   Difficulty walking:  no              Past Medical History:   Past Medical History:   Diagnosis Date    Allergic rhinitis     environmental    Hypertension     Kidney stones     had to have lithotripsy    Nosebleeds     Palpitations 2010    frequent PVC's, couplets- seen Cardiologist x 2 in past ? name, improved in last couple years per pt       Past Surgical History:     Past Surgical History:   Procedure Laterality Date    COLONOSCOPY  2009    Dr. Bhavik Guerin, normal per pt     HERNIA REPAIR      right inguinal    [address] REPLACEMENT  12/2007 does not show any tenderness, deformity or injury. Range of motion is normal and pain-free. There is no gross instability. There are no rashes, ulcerations or lesions. Strength and tone are normal. No atrophy or abnormal movements are noted. · LEFT LOWER EXTREMITY:  Inspection/examination of the left lower extremity does not show any tenderness, deformity or injury. Range of motion is normal and pain-free. There is no gross instability. There are no rashes, ulcerations or lesions. Strength and tone are normal. No atrophy or abnormal movements are noted. Diagnostic Testing:    No new diagnostics  Results for orders placed or performed during the hospital encounter of 03/02/18   CBC Auto Differential   Result Value Ref Range    WBC 8.4 4.0 - 11.0 K/uL    RBC 5.21 4.20 - 5.90 M/uL    Hemoglobin 16.4 13.5 - 17.5 g/dL    Hematocrit 48.4 40.5 - 52.5 %    MCV 92.9 80.0 - 100.0 fL    MCH 31.6 26.0 - 34.0 pg    MCHC 34.0 31.0 - 36.0 g/dL    RDW 13.1 12.4 - 15.4 %    Platelets 192 413 - 007 K/uL    MPV 6.7 5.0 - 10.5 fL    Neutrophils % 64.2 %    Lymphocytes % 24.0 %    Monocytes % 8.4 %    Eosinophils % 2.4 %    Basophils % 1.0 %    Neutrophils # 5.4 1.7 - 7.7 K/uL    Lymphocytes # 2.0 1.0 - 5.1 K/uL    Monocytes # 0.7 0.0 - 1.3 K/uL    Eosinophils # 0.2 0.0 - 0.6 K/uL    Basophils # 0.1 0.0 - 0.2 K/uL   C-Reactive Protein   Result Value Ref Range    CRP <0.3 0.0 - 5.1 mg/L   Sedimentation Rate   Result Value Ref Range    Sed Rate 8 0 - 20 mm/Hr     Impression:       1. Lumbar herniated disc    2. Lumbar radiculopathy        Plan:  Clinical Course: Improving  1. Medications:  Tramadol 50 mg po tid #21. Low risk on ORT. OARRS reviewed and appropriate. Risks benefits alternatives were discussed. 2. PT:  Encouraged to continue with HEP. 3. Further studies:  No further studies. 4. Interventional:  None at this time  5. Follow up:  4-6 weeks          Neva.  Ze Shepard MD, DEVIN, [facility]  Board Certified in Physical Medicine & Rehabilitation  Board Certified and Fellowship Trained in [facility] ([facility])             This dictation was performed with a verbal recognition program [facility]) and it was checked for errors. It is possible that there are still dictated errors within this office note. If so, please bring any errors to my attention for an addendum. All efforts were made to ensure that this office note is accurate.

## 2018-03-20 ENCOUNTER — HOSPITAL ENCOUNTER (OUTPATIENT)
Dept: PHYSICAL THERAPY | Age: 63
Discharge: HOME | End: 2018-03-21
Admitting: PHYSICAL MEDICINE & REHABILITATION

## 2018-03-20 NOTE — FLOWSHEET NOTE
Manual sidelying hip flexor stretch                              Seated Elbows on Knees  x3'     Multifids Crutch   x15     Bridge 15x     Flexion SLR      Prone SLR's      Sidelying Abduction SLR      Sidelying Clam shells 15x B     Supine Clam Shells against resistance      TA Hip Fall Out  x10 each     Bridge with single knee extension      Side Plank      SKC  2x30\" each     Lumbar rotation  15\" x5 B     Single knee to chest      Double knee to chest  10x10\"     Hand Knee Rock/child's pose  x10     Prone on elbows      Prone press up      Standing Extension      Pelvic tilts 15x5\"       [x] Provided education in neutral spine position as it relates to the functional positions of  [x] sitting,    [x] standing   [] bending. [] Provided verbal cueing for self stretching of:   [] Hamstrings   [] Piriformis    [] Figure 4 technique    [] Knee to opposite shoulder technique   [] Quad   [] Hip Flexors  [x] Provided education in multimodal approach to treatment to include proper dosage of postural awareness, body mechanics awareness, hip flexibility, lumbopelvic stabilization, spinal ROM, manual therapy and modalities. [x]  Provided education in selective activation of the following:   [x]TA (abdominal drawing in maneuver)   []Multifidus (prone verbal cueing)    Therapeutic Exercise and NMR EXR  [x] (99473) Provided verbal/tactile cueing for activities related to strengthening, flexibility, endurance, ROM  for improvements in proximal hip and core control with self care, mobility, lifting and ambulation.  [] (90934) Provided verbal/tactile cueing for activities related to improving balance, coordination, kinesthetic sense, posture, motor skill, proprioception  to assist with core control in self care, mobility, lifting, and ambulation.      Therapeutic Activities:    [] (90398 or 20564) Provided verbal/tactile cueing for activities related to improving balance, coordination, kinesthetic sense, posture, motor skill, demonstrate less than 20% impairment, limitation or restriction in:  - changing and maintaining body position  - Patient will demonstrate an increase in functional strength to allow for proper functional mobility as indicated by patients Functional Deficits. - Patient will return to all desired, higher level functional activities without increased symptoms or restriction. Progression Towards Functional goals:  [] Patient is progressing as expected towards functional goals listed. [] Progression is slowed due to complexities listed. [] Progression has been slowed due to co-morbidities. [x] Plan just implemented, too soon to assess goals progression  [] Other:     ASSESSMENT:  Patient given strategies to manage back pain with standing. Treatment/Activity Tolerance:  [] Patient tolerated treatment well [] Patient limited by fatique  [x] Patient limited by pain  [] Patient limited by other medical complications  [] Other:     Prognosis: [x] Good [] Fair  [] Poor    Patient Requires Follow-up: [x] Yes  [] No    PLAN: See eval  [x] Continue per plan of care [] Alter current plan (see comments)  [] Plan of care initiated [] Hold pending MD visit [] Discharge    Plan Moving Forward:  Initiate and continue a program geared toward manual therapy, posture and body mechanics training, lower extremity flexibility and lumbopelvic stabilization with a graded progression toward more upright and functional positions.     Electronically signed by: Emilia Brandon PT [id number]

## 2018-03-22 ENCOUNTER — HOSPITAL ENCOUNTER (OUTPATIENT)
Dept: PHYSICAL THERAPY | Age: 63
Discharge: HOME | End: 2018-03-23
Admitting: PHYSICAL MEDICINE & REHABILITATION

## 2018-03-30 ENCOUNTER — HOSPITAL ENCOUNTER (OUTPATIENT)
Dept: PHYSICAL THERAPY | Age: 63
Discharge: HOME | End: 2018-03-31
Admitting: PHYSICAL MEDICINE & REHABILITATION

## 2018-04-01 ENCOUNTER — HOSPITAL ENCOUNTER (OUTPATIENT)
Dept: PHYSICAL THERAPY | Age: 63
Discharge: HOME | End: 2018-04-05
Attending: PHYSICAL MEDICINE & REHABILITATION | Admitting: PHYSICAL MEDICINE & REHABILITATION

## 2018-04-03 ENCOUNTER — HOSPITAL ENCOUNTER (OUTPATIENT)
Dept: PHYSICAL THERAPY | Age: 63
Discharge: HOME | End: 2018-04-04
Admitting: PHYSICAL MEDICINE & REHABILITATION

## 2018-04-03 NOTE — FLOWSHEET NOTE
patient for proper core and proximal hip recruitment and positioning with ambulation re-education     Home Exercise Program:    [x] (17153) Reviewed/Progressed HEP activities related to strengthening, flexibility, endurance, ROM of core, proximal hip and LE for functional self-care, mobility, lifting and ambulation   [] (71948) Reviewed/Progressed HEP activities related to improving balance, coordination, kinesthetic sense, posture, motor skill, proprioception of core, proximal hip and LE for self care, mobility, lifting, and ambulation      Manual Treatments:  PROM / STM / Oscillations-Mobs:  G-I, II, III, IV (PA's, Inf., Post.)  [x] (81775) Provided manual therapy to mobilize proximal hip and LS spine soft tissue/joints for the purpose of modulating pain, promoting relaxation,  increasing ROM, reducing/eliminating soft tissue swelling/inflammation/restriction, improving soft tissue extensibility and allowing for proper ROM for normal function with self care, mobility, lifting and ambulation. Modalities:   ' held due to time constraints    Charges:  Timed Code Treatment Minutes: 39'   Total Treatment Minutes: 45'     [] EVAL  [x] EU(40523) x  2   [] IONTO  [] NMR (61570) x      [] VASO  [x] Manual (81371) x  1    [] Other:  [] TA x       [] Mech Traction (41816)  [] ES(attended) (56501)      [] ES (un) (83915):     GOALS:  Patient stated goal: reduce pain so I can get back to work and exercise     Therapist goals for Patient:   Short Term Goals: To be achieved in: 2 weeks  - Independent in HEP and progression per patient tolerance, in order to prevent re-injury. - Patient will have a decrease in pain to facilitate improvement in movement, function, and ADLs as indicated by Functional Deficits. Long Term Goals:  To be achieved in: 6 weeks  - The patient is expected to demonstrate less than 20% impairment, limitation or restriction in:  - changing and maintaining body position  - Patient will demonstrate an

## 2018-04-05 ENCOUNTER — OFFICE VISIT (OUTPATIENT)
Dept: ORTHOPEDIC SURGERY | Age: 63
End: 2018-04-05

## 2018-04-05 ENCOUNTER — HOSPITAL ENCOUNTER (OUTPATIENT)
Dept: PHYSICAL THERAPY | Age: 63
Discharge: HOME | End: 2018-04-06
Admitting: PHYSICAL MEDICINE & REHABILITATION

## 2018-04-05 VITALS
HEART RATE: 71 BPM | HEIGHT: 68 IN | BODY MASS INDEX: 26.96 KG/M2 | SYSTOLIC BLOOD PRESSURE: 136 MMHG | WEIGHT: 177.91 LBS | DIASTOLIC BLOOD PRESSURE: 86 MMHG

## 2018-04-05 DIAGNOSIS — M51.26 PROTRUSION OF LUMBAR INTERVERTEBRAL DISC: Primary | ICD-10-CM

## 2018-04-05 DIAGNOSIS — M47.812 CERVICAL SPONDYLOSIS WITHOUT MYELOPATHY: ICD-10-CM

## 2018-04-05 PROCEDURE — 99213 OFFICE O/P EST LOW 20 MIN: CPT | Performed by: PHYSICAL MEDICINE & REHABILITATION

## 2018-04-05 NOTE — DISCHARGE SUMMARY
The [address] [facility] [address] [facility]     Physical Therapy Discharge  Date: 2018        Patient Name:  Audra Smallwood    :  1955  MRN: [id number]  Referring Physician: Dr George ALVAREZ                        ICD Code:M51.26  [] Surgical [x] Conservative    Total number of visits: 8   Reporting Period:   Beginning Date:3/9/18   End Date:18    OBJECTIVE  Test used Initial score Discharge Score   Pain Summary  9/10 1/10   Functional questionnaire Modified Oswestry 60% 10%   Functional Testing            ROM Lumbar ext 25% 75%         Strength Hip abd 3/5 4+/5              Functional Limitation G-Code (if applicable):   CI          Test/tests used to determine % limitation:  Modified Oswestry  Actual Score used to drive % limitation:     10%    Co-morbidities/Complexities (which will affect course of rehabilitation):   []None           Arthritic conditions   []Rheumatoid arthritis (M05.9)  [x]Osteoarthritis (M19.91)   Cardiovascular conditions   [x]Hypertension (I10)  []Hyperlipidemia (E78.5)  []Angina pectoris (I20)  []Atherosclerosis (I70)   Musculoskeletal conditions   []Disc pathology   []Congenital spine pathologies   []Prior surgical intervention  []Osteoporosis (M81.8)  []Osteopenia (M85.8)   Endocrine conditions   []Hypothyroid (E03.9)  []Hyperthyroid Gastrointestinal conditions   []Constipation (F61.72)   Metabolic conditions   []Morbid obesity (E66.01)  []Diabetes type 1(E10.65) or 2 (E11.65)   []Neuropathy (G60.9)     Pulmonary conditions   []Asthma (J45)  []Coughing   []COPD (J44.9)   Psychological Disorders  []Anxiety (F41.9)  []Depression (F32.9)   []Other:   []Other:                Treatment to date:  [x] Therapeutic Exercise    [] Modalities:  [] Therapeutic Activity             []Ultrasound            [x]Electrical Stimulation  [] Gait Training     []Cervical Traction    [] Lumbar Traction  [x] Neuromuscular Re-education [x]

## 2018-04-05 NOTE — FLOWSHEET NOTE
recruitment and positioning with ambulation re-education     Home Exercise Program:    [x] (39849) Reviewed/Progressed HEP activities related to strengthening, flexibility, endurance, ROM of core, proximal hip and LE for functional self-care, mobility, lifting and ambulation   [] (18240) Reviewed/Progressed HEP activities related to improving balance, coordination, kinesthetic sense, posture, motor skill, proprioception of core, proximal hip and LE for self care, mobility, lifting, and ambulation      Manual Treatments:  PROM / STM / Oscillations-Mobs:  G-I, II, III, IV (PA's, Inf., Post.)  [x] (41081) Provided manual therapy to mobilize proximal hip and LS spine soft tissue/joints for the purpose of modulating pain, promoting relaxation,  increasing ROM, reducing/eliminating soft tissue swelling/inflammation/restriction, improving soft tissue extensibility and allowing for proper ROM for normal function with self care, mobility, lifting and ambulation. Modalities:   Hi-volt and cold pack 15'     Charges:  Timed Code Treatment Minutes: 39'   Total Treatment Minutes: 39'     [] EVAL  [x] HM(56341) x  2   [] IONTO  [] NMR (76156) x      [] VASO  [x] Manual (29746) x  1    [] Other:  [] TA x       [] Mech Traction (84144)  [] ES(attended) (29096)      [x] ES (un) (03048):     GOALS:  Patient stated goal: reduce pain so I can get back to work and exercise     Therapist goals for Patient:   Short Term Goals: To be achieved in: 2 weeks  - Independent in HEP and progression per patient tolerance, in order to prevent re-injury. - Patient will have a decrease in pain to facilitate improvement in movement, function, and ADLs as indicated by Functional Deficits. Long Term Goals:  To be achieved in: 6 weeks  - The patient is expected to demonstrate less than 20% impairment, limitation or restriction in:  - changing and maintaining body position  - Patient will demonstrate an increase in functional strength to allow for proper functional mobility as indicated by patients Functional Deficits. - Patient will return to all desired, higher level functional activities without increased symptoms or restriction. Progression Towards Functional goals:  [x] Patient is progressing as expected towards functional goals listed. [] Progression is slowed due to complexities listed. [] Progression has been slowed due to co-morbidities. [] Plan just implemented, too soon to assess goals progression  [] Other:     ASSESSMENT:  Patient given strategies to manage back pain with standing. Treatment/Activity Tolerance:  [x] Patient tolerated treatment well [] Patient limited by fatique  [] Patient limited by pain  [] Patient limited by other medical complications  [] Other:     Prognosis: [x] Good [] Fair  [] Poor    Patient Requires Follow-up: [] Yes  [x] No    PLAN:  D/C to HEP  [] Continue per plan of care [] Alter current plan (see comments)  [] Plan of care initiated [] Hold pending MD visit [x] Discharge    Plan Moving Forward:  Initiate and continue a program geared toward manual therapy, posture and body mechanics training, lower extremity flexibility and lumbopelvic stabilization with a graded progression toward more upright and functional positions.     Electronically signed by: Garrett Turcios PT [id number]

## 2018-07-25 ENCOUNTER — OFFICE VISIT (OUTPATIENT)
Dept: ORTHOPEDIC SURGERY | Age: 63
End: 2018-07-25

## 2018-07-25 VITALS
DIASTOLIC BLOOD PRESSURE: 80 MMHG | HEIGHT: 68 IN | SYSTOLIC BLOOD PRESSURE: 129 MMHG | BODY MASS INDEX: 26.83 KG/M2 | WEIGHT: 177 LBS

## 2018-07-25 DIAGNOSIS — M48.02 FORAMINAL STENOSIS OF CERVICAL REGION: ICD-10-CM

## 2018-07-25 DIAGNOSIS — M47.812 SPONDYLOSIS OF CERVICAL REGION WITHOUT MYELOPATHY OR RADICULOPATHY: Primary | ICD-10-CM

## 2018-07-25 DIAGNOSIS — M50.30 DDD (DEGENERATIVE DISC DISEASE), CERVICAL: ICD-10-CM

## 2018-07-25 PROCEDURE — 99213 OFFICE O/P EST LOW 20 MIN: CPT | Performed by: PHYSICAL MEDICINE & REHABILITATION

## 2018-07-25 NOTE — PROGRESS NOTES
Follow up: SPINE      CHIEF COMPLAINT:    Chief Complaint   Patient presents with    Neck Pain     states he continues to have neck pain, the discomfort is tolerable but is feeling frustrated that it has not improved more, pain worsens with ROM / coughing / sneezing    Arm Pain     left, down to the elbow, pain is intermittent       HISTORY OF PRESENT ILLNESS:        The patient is a 58 y.o. male whom reports He presents with continued neck pain over left side is parascapular area and into left elbow. He has completed extensive physical therapy including dry needling. He has failed anti-inflammatories and muscle relaxers. He has had significant improvement with these measures but he reports his symptoms are persistent. He reports that this limits his ability to swim or to exercise. He would like to look at interventional options to help him with this pain. He last had an MRI in May 2017. Pain Assessment  Location of Pain: Neck  Location Modifiers: Posterior  Severity of Pain: 3  Quality of Pain: Aching  Duration of Pain: Persistent  Frequency of Pain: Constant  Aggravating Factors: Bending, Other (Comment) (ROM)  Limiting Behavior: Some  Relieving Factors:  Other (Comment) (Massage / Dry needling (temporary relief))  Result of Injury: No  Work-Related Injury: No  Are there other pain locations you wish to document?: No    Associated signs and symptoms:   Neurogenic bowel or bladder symptoms:  no   Perceived weakness:  no   Difficulty walking:  no              Past Medical History:   Past Medical History:   Diagnosis Date    Allergic rhinitis     environmental    Hypertension     Kidney stones     had to have lithotripsy    Nosebleeds     Palpitations 2010    frequent PVC's, couplets- seen Cardiologist x 2 in past ? name, improved in last couple years per pt       Past Surgical History:     Past Surgical History:   Procedure Laterality Date    COLONOSCOPY  2009    Dr. Tramaine Black, Age of Onset    High Blood Pressure Mother     Heart Disease Father        REVIEW OF SYSTEMS:   CONSTITUTIONAL: Denies unexplained weight loss, fevers, chills or fatigue  NEUROLOGICAL: Denies unsteady gait or progressive weakness  MUSCULOSKELETAL: Denies joint swelling or redness  GI: Denies nausea, vomiting, diarrhea   : Denies bowel or bladder issues       PHYSICAL EXAM:    Vitals: Blood pressure 129/80, height 5' 8\" (1.727 m), weight 177 lb (80.3 kg). GENERAL EXAM:  · General Apparence: Patient is adequately groomed with no evidence of malnutrition. · Psychiatric: Orientation: The patient is oriented to time, place and person. The patient's mood and affect are appropriate   · Vascular: Examination reveals no swelling and palpation reveals no tenderness in upper or lower extremities. Good capillary refill. · The lymphatic examination of the neck, axillae and groin reveals all areas to be without enlargement or induration  · Sensation is intact without deficit in the upper and lower extremities to light touch and pinprick  · Coordination of the upper and lower extremities are normal.    CERVICAL EXAMINATION:  · Inspection: Local inspection shows no step-off or bruising. Cervical alignment is normal. No instability is noted. · Palpation and Percussion: No evidence of tenderness at the midline. Paraspinal tenderness is mildly present over the left side. There is no paraspinal spasm. Skin:There are no rashes, ulcerations or lesions  · Range of Motion:  Increased pain with rotation and lateral bending to the left   · Strength: 5/5 bilateral upper extremities  · Special Tests:   Spurling's and Flores's are negative bilaterally. Rutledge and Impingement tests are negative bilaterally. · Skin:There are no rashes, ulcerations or lesions in right & left upper extremities. · Reflexes: Bilaterally triceps, biceps and brachioradialis are 2+. Clonus absent bilaterally at the feet.  No pathological reflexes are noted. · Gait & station: normal, patient ambulates without assistance  · Additional Examinations:  · RIGHT UPPER EXTREMITY:  Inspection/examination of the right upper extremity does not show any tenderness, deformity or injury. Range of motion is unremarkable and pain-free. There is no gross instability. There are no rashes, ulcerations or lesions. Strength and tone are normal. No atrophy or abnormal movements are noted. · LEFT UPPER EXTREMITY: Inspection/examination of the left upper extremity does not show any tenderness, deformity or injury. Range of motion is unremarkable and pain-free. There is no gross instability. There are no rashes, ulcerations or lesions. Strength and tone are normal. No atrophy or abnormal movements are noted. Diagnostic Testing:    MR cervical spine shows 5/18/17  Moderate multilevel degenerative changes worse at C3-C4 where there is right central and    foraminal disc protrusion causing mild cord impingement. Multilevel prominent foraminal    narrowing as described. Small focal lesion in the right thyroid, correlate clinically.        Results for orders placed or performed during the hospital encounter of 03/02/18   CBC Auto Differential   Result Value Ref Range    WBC 8.4 4.0 - 11.0 K/uL    RBC 5.21 4.20 - 5.90 M/uL    Hemoglobin 16.4 13.5 - 17.5 g/dL    Hematocrit 48.4 40.5 - 52.5 %    MCV 92.9 80.0 - 100.0 fL    MCH 31.6 26.0 - 34.0 pg    MCHC 34.0 31.0 - 36.0 g/dL    RDW 13.1 12.4 - 15.4 %    Platelets 354 910 - 676 K/uL    MPV 6.7 5.0 - 10.5 fL    Neutrophils % 64.2 %    Lymphocytes % 24.0 %    Monocytes % 8.4 %    Eosinophils % 2.4 %    Basophils % 1.0 %    Neutrophils # 5.4 1.7 - 7.7 K/uL    Lymphocytes # 2.0 1.0 - 5.1 K/uL    Monocytes # 0.7 0.0 - 1.3 K/uL    Eosinophils # 0.2 0.0 - 0.6 K/uL    Basophils # 0.1 0.0 - 0.2 K/uL   C-Reactive Protein   Result Value Ref Range    CRP <0.3 0.0 - 5.1 mg/L   Sedimentation Rate   Result Value Ref Range    Sed Rate 8 0 - 20

## 2018-07-27 ENCOUNTER — OFFICE VISIT (OUTPATIENT)
Dept: FAMILY MEDICINE CLINIC | Age: 63
End: 2018-07-27

## 2018-07-27 VITALS
OXYGEN SATURATION: 98 % | WEIGHT: 172.6 LBS | BODY MASS INDEX: 26.16 KG/M2 | DIASTOLIC BLOOD PRESSURE: 84 MMHG | HEIGHT: 68 IN | SYSTOLIC BLOOD PRESSURE: 138 MMHG | RESPIRATION RATE: 16 BRPM | HEART RATE: 72 BPM

## 2018-07-27 DIAGNOSIS — I47.1 SUPRAVENTRICULAR TACHYCARDIA (HCC): ICD-10-CM

## 2018-07-27 DIAGNOSIS — I10 ESSENTIAL HYPERTENSION, BENIGN: Primary | ICD-10-CM

## 2018-07-27 DIAGNOSIS — M50.90 CERVICAL NECK PAIN WITH EVIDENCE OF DISC DISEASE: ICD-10-CM

## 2018-07-27 PROCEDURE — 99214 OFFICE O/P EST MOD 30 MIN: CPT | Performed by: INTERNAL MEDICINE

## 2018-07-27 ASSESSMENT — ENCOUNTER SYMPTOMS
GASTROINTESTINAL NEGATIVE: 1
ALLERGIC/IMMUNOLOGIC NEGATIVE: 1
BACK PAIN: 0
RESPIRATORY NEGATIVE: 1

## 2018-08-06 ENCOUNTER — TELEPHONE (OUTPATIENT)
Dept: ORTHOPEDIC SURGERY | Age: 63
End: 2018-08-06

## 2018-08-17 ENCOUNTER — HOSPITAL ENCOUNTER (OUTPATIENT)
Dept: PAIN MANAGEMENT | Age: 63
Discharge: HOME | End: 2018-08-17
Attending: PHYSICAL MEDICINE & REHABILITATION | Admitting: PHYSICAL MEDICINE & REHABILITATION

## 2018-08-17 VITALS
SYSTOLIC BLOOD PRESSURE: 116 MMHG | RESPIRATION RATE: 16 BRPM | WEIGHT: 174 LBS | DIASTOLIC BLOOD PRESSURE: 62 MMHG | TEMPERATURE: 97.2 F | OXYGEN SATURATION: 100 % | BODY MASS INDEX: 26.37 KG/M2 | HEART RATE: 55 BPM | HEIGHT: 68 IN

## 2018-08-17 ASSESSMENT — PAIN - FUNCTIONAL ASSESSMENT: PAIN_FUNCTIONAL_ASSESSMENT: 0-10

## 2018-08-17 ASSESSMENT — PAIN DESCRIPTION - DESCRIPTORS: DESCRIPTORS: SHARP

## 2018-08-17 NOTE — PROGRESS NOTES
IV discontinued, catheter intact, and dressing applied. Procedural dressing dry and intact. Bilateral upper extremities equal in strength. Discharge instructions reviewed with patient or responsible adult, signed and copy given. All home medications have been reviewed. All questions answered and patient or responsible adult verbalized understanding.   PAIN LEVEL AT DISCHARGE ___0__

## 2018-08-23 ENCOUNTER — OFFICE VISIT (OUTPATIENT)
Dept: ORTHOPEDIC SURGERY | Age: 63
End: 2018-08-23

## 2018-08-23 VITALS — WEIGHT: 173.94 LBS | BODY MASS INDEX: 26.36 KG/M2 | HEIGHT: 68 IN

## 2018-08-23 DIAGNOSIS — M47.812 SPONDYLOSIS OF CERVICAL REGION WITHOUT MYELOPATHY OR RADICULOPATHY: Primary | ICD-10-CM

## 2018-08-23 DIAGNOSIS — M79.18 MYOFACIAL MUSCLE PAIN: ICD-10-CM

## 2018-08-23 PROCEDURE — 99213 OFFICE O/P EST LOW 20 MIN: CPT | Performed by: PHYSICAL MEDICINE & REHABILITATION

## 2018-08-23 RX ORDER — TIZANIDINE 4 MG/1
4 TABLET ORAL NIGHTLY
Qty: 30 TABLET | Refills: 0 | Status: SHIPPED | OUTPATIENT
Start: 2018-08-23 | End: 2018-09-22

## 2018-08-23 NOTE — PROGRESS NOTES
PVC's, couplets- seen Cardiologist x 2 in past ? name, improved in last couple years per pt       Past Surgical History:     Past Surgical History:   Procedure Laterality Date    COLONOSCOPY  2009    Dr. Marcos Espinal, normal per pt     HERNIA REPAIR      right inguinal     HIP SURGERY      JOINT REPLACEMENT  12/2007    hip replacement- Left     KNEE ARTHROSCOPY  12/2006    LITHOTRIPSY      d/t kidney stones- Dr. Will Baker  1980's    TONSILLECTOMY  childhood     Current Medications:     Current Outpatient Prescriptions:     lisinopril (PRINIVIL;ZESTRIL) 20 MG tablet, Take 1 tablet by mouth daily, Disp: 90 tablet, Rfl: 3    vitamin D (CHOLECALCIFEROL) 1000 UNIT TABS tablet, Take 1,000 Units by mouth daily, Disp: , Rfl:     magnesium gluconate (MAGONATE) 500 MG tablet, Take 500 mg by mouth 2 times daily, Disp: , Rfl:     Glucosamine-Chondroit-Vit C-Mn (GLUCOSAMINE 1500 COMPLEX PO), Take by mouth, Disp: , Rfl:     ibuprofen (ADVIL;MOTRIN) 200 MG tablet, Take 400 mg by mouth every 6 hours as needed for Pain., Disp: , Rfl:     Omega-3 Fatty Acids (FISH OIL) 1000 MG CAPS, Take 1,000 mg by mouth daily. , Disp: , Rfl:     aspirin 81 MG tablet, Take 81 mg by mouth daily. , Disp: , Rfl:     therapeutic multivitamin-minerals (THERAGRAN-M) tablet, Take 1 tablet by mouth daily. , Disp: , Rfl:   Allergies:  Patient has no known allergies. Social History:    reports that he quit smoking about 9 years ago. He has a 7.50 pack-year smoking history. He has never used smokeless tobacco. He reports that he drinks alcohol. He reports that he does not use drugs.   Family History:   Family History   Problem Relation Age of Onset    High Blood Pressure Mother     Heart Disease Father        REVIEW OF SYSTEMS:   CONSTITUTIONAL: Denies unexplained weight loss, fevers, chills or fatigue  NEUROLOGICAL: Denies unsteady gait or progressive weakness  MUSCULOSKELETAL: Denies joint swelling or redness  GI: Denies nausea, vomiting, diarrhea   : Denies bowel or bladder issues       PHYSICAL EXAM:    Vitals: Height 5' 7.99\" (1.727 m), weight 173 lb 15.1 oz (78.9 kg). GENERAL EXAM:  · General Apparence: Patient is adequately groomed with no evidence of malnutrition. · Psychiatric: Orientation: The patient is oriented to time, place and person. The patient's mood and affect are appropriate   · Vascular: Examination reveals no swelling and palpation reveals no tenderness in upper or lower extremities. Good capillary refill. · The lymphatic examination of the neck, axillae and groin reveals all areas to be without enlargement or induration  · Sensation is intact without deficit in the upper and lower extremities to light touch and pinprick  · Coordination of the upper and lower extremities are normal.    CERVICAL EXAMINATION:  · Inspection: Local inspection shows no step-off or bruising. Cervical alignment is normal. No instability is noted. · Palpation and Percussion: No evidence of tenderness at the midline. Paraspinal tenderness is mildly present over the left upper trapezius muscle. There is no paraspinal spasm. Skin:There are no rashes, ulcerations or lesions  · Range of Motion:  Mildly increased pain with rotation to the lef   · Strength: 5/5 bilateral upper extremities  · Special Tests:   Spurling's and Flores's are negative bilaterally. Rutledge and Impingement tests are negative bilaterally. · Skin:There are no rashes, ulcerations or lesions in right & left upper extremities. · Reflexes: Bilaterally triceps, biceps and brachioradialis are 1+. Clonus absent bilaterally at the feet. No pathological reflexes are noted. · Gait & station: normal, patient ambulates without assistance  · Additional Examinations:  · RIGHT UPPER EXTREMITY:  Inspection/examination of the right upper extremity does not show any tenderness, deformity or injury. Range of motion is unremarkable and pain-free. There is no gross instability. There are no rashes, ulcerations or lesions. Strength and tone are normal. No atrophy or abnormal movements are noted. · LEFT UPPER EXTREMITY: Inspection/examination of the left upper extremity does not show any tenderness, deformity or injury. Range of motion is unremarkable and pain-free. There is no gross instability. There are no rashes, ulcerations or lesions. Strength and tone are normal. No atrophy or abnormal movements are noted. Diagnostic Testing:    MR cervical spine shows 5/18/17  Moderate multilevel degenerative changes worse at C3-C4 where there is right central and    foraminal disc protrusion causing mild cord impingement. Multilevel prominent foraminal    narrowing as described. Results for orders placed or performed during the hospital encounter of 03/02/18   CBC Auto Differential   Result Value Ref Range    WBC 8.4 4.0 - 11.0 K/uL    RBC 5.21 4.20 - 5.90 M/uL    Hemoglobin 16.4 13.5 - 17.5 g/dL    Hematocrit 48.4 40.5 - 52.5 %    MCV 92.9 80.0 - 100.0 fL    MCH 31.6 26.0 - 34.0 pg    MCHC 34.0 31.0 - 36.0 g/dL    RDW 13.1 12.4 - 15.4 %    Platelets 415 530 - 713 K/uL    MPV 6.7 5.0 - 10.5 fL    Neutrophils % 64.2 %    Lymphocytes % 24.0 %    Monocytes % 8.4 %    Eosinophils % 2.4 %    Basophils % 1.0 %    Neutrophils # 5.4 1.7 - 7.7 K/uL    Lymphocytes # 2.0 1.0 - 5.1 K/uL    Monocytes # 0.7 0.0 - 1.3 K/uL    Eosinophils # 0.2 0.0 - 0.6 K/uL    Basophils # 0.1 0.0 - 0.2 K/uL   C-Reactive Protein   Result Value Ref Range    CRP <0.3 0.0 - 5.1 mg/L   Sedimentation Rate   Result Value Ref Range    Sed Rate 8 0 - 20 mm/Hr     Impression:       1. Spondylosis of cervical region without myelopathy or radiculopathy    2. Myofacial muscle pain        Plan:  Clinical Course: are improving    1. Medications:  I will add a muscle relaxer to the current regimen. Add compound cream through [facility]  2.  PT:  Add PT including dry needling to the left upper

## 2018-09-06 ENCOUNTER — HOSPITAL ENCOUNTER (OUTPATIENT)
Dept: PHYSICAL THERAPY | Age: 63
Setting detail: THERAPIES SERIES
Discharge: HOME | End: 2018-09-06
Payer: COMMERCIAL

## 2018-09-06 PROCEDURE — 97161 PT EVAL LOW COMPLEX 20 MIN: CPT | Performed by: PHYSICAL THERAPIST

## 2018-09-06 PROCEDURE — G8982 BODY POS GOAL STATUS: HCPCS | Performed by: PHYSICAL THERAPIST

## 2018-09-06 PROCEDURE — G0283 ELEC STIM OTHER THAN WOUND: HCPCS | Performed by: PHYSICAL THERAPIST

## 2018-09-06 PROCEDURE — 97012 MECHANICAL TRACTION THERAPY: CPT | Performed by: PHYSICAL THERAPIST

## 2018-09-06 PROCEDURE — G8981 BODY POS CURRENT STATUS: HCPCS | Performed by: PHYSICAL THERAPIST

## 2018-09-06 PROCEDURE — 97140 MANUAL THERAPY 1/> REGIONS: CPT | Performed by: PHYSICAL THERAPIST

## 2018-09-06 NOTE — PLAN OF CARE
through UE or spine   []Reduced ability to ambulate prolonged functional periods/distances   []other:    Participation Restrictions   []Reduced participation in self care activities   [x]Reduced participation in home management activities   [x]Reduced participation in work activities   [x]Reduced participation in social activities. []Reduced participation in sport/recreational activities. Classification/Subgrouping:   [x]signs/symptoms consistent with neck pain with mobility deficits     []signs/symptoms consistent with neck pain with movement coordinated impairments    []signs/symptoms consistent with neck pain with radiating pain    []signs/symptoms consistent with neck pain with headaches (cervicogenic)    []Signs/symptoms consistent with nerve root involvement including myotome & dermatome dysfunction   []sign/symptoms consistent with facet dysfunction of cervical and thoracic spine    []signs/symptoms consistent suggesting central cord compression/UMN syndromes   []signs/symptoms consistent with discogenic cervical pain   []signs/symptoms consistent with rib dysfunction   []signs/symptoms consistent with postural dysfunction   []signs/symptoms consistent with shoulder pathology    []signs/symptoms consistent with post-surgical status including decreased ROM, strength and function.    []signs/symptoms consistent with pathology which may benefit from Dry Needling   []signs/symptoms which may limit the use of advanced manual therapy techniques: (Hypertension, recent trauma, intolerance to end range positions, prior TIA, visual issues, UE myotomes loss )     Prognosis/Rehab Potential:      []Excellent   [x]Good    []Fair   []Poor    Tolerance of evaluation/treatment:    []Excellent   [x]Good    []Fair   []Poor      Physical Therapy Evaluation Complexity Justification  [x] A history of present problem with:  [] no personal factors and/or comorbidities that impact the plan of care;  [x]1-2 personal factors and/or

## 2018-09-13 ENCOUNTER — HOSPITAL ENCOUNTER (OUTPATIENT)
Dept: PHYSICAL THERAPY | Age: 63
Setting detail: THERAPIES SERIES
Discharge: HOME | End: 2018-09-13
Payer: COMMERCIAL

## 2018-09-13 PROCEDURE — 97012 MECHANICAL TRACTION THERAPY: CPT | Performed by: PHYSICAL THERAPIST

## 2018-09-13 PROCEDURE — 97140 MANUAL THERAPY 1/> REGIONS: CPT | Performed by: PHYSICAL THERAPIST

## 2018-09-13 NOTE — FLOWSHEET NOTE
EXR  [] (61090) Provided verbal/tactile cueing for activities related to strengthening, flexibility, endurance, ROM  for improvements in cervical, postural, scapular, scapulothoracic and UE control with self care, reaching, carrying, lifting, house/yardwork, driving/computer work.    [] (35831) Provided verbal/tactile cueing for activities related to improving balance, coordination, kinesthetic sense, posture, motor skill, proprioception  to assist with cervical, scapular, scapulothoracic and UE control with self care, reaching, carrying, lifting, house/yardwork, driving/computer work. Therapeutic Activities:    [] (91256 or 92285) Provided verbal/tactile cueing for activities related to improving balance, coordination, kinesthetic sense, posture, motor skill, proprioception and motor activation to allow for proper function of cervical, scapular, scapulothoracic and UE control with self care, carrying, lifting, driving/computer work.      Home Exercise Program:    [x] (27508) Reviewed/Progressed HEP activities related to strengthening, flexibility, endurance, ROM of cervical, scapular, scapulothoracic and UE control with self care, reaching, carrying, lifting, house/yardwork, driving/computer work  [] (39328) Reviewed/Progressed HEP activities related to improving balance, coordination, kinesthetic sense, posture, motor skill, proprioception of cervical, scapular, scapulothoracic and UE control with self care, reaching, carrying, lifting, house/yardwork, driving/computer work      Manual Treatments:    [x] (68922) Provided manual therapy to mobilize soft tissue/joints of cervical/CT, scapular GHJ and UE for the purpose of decreasing headache, modulating pain, promoting relaxation,  increasing ROM, reducing/eliminating soft tissue swelling/inflammation/restriction, improving soft tissue extensibility and allowing for proper ROM for normal function with self care, reaching, carrying, lifting, house/yardwork, driving/computer work  Dry needling manual therapy: consisted on the placement of 6 needles in the following muscles:  left upper trap, cerivcal paraspinals at C6. A 40 and 50 mm needle was inserted, piston, rotated, and coned to produce intramuscular mobilization. These techniques were used to restore functional range of motion, reduce muscle spasm and induce healing in the corresponding musculature. (69999)  Clean Technique was utilized today while applying Dry needling treatment. The treatment sites where cleaned with 70% solution of  isopropyl alcohol . The PT washed their hands and utilized treatment gloves along with hand  prior to inserting the needles. All needles where removed and discarded in the appropriate sharps container. Modalities:  Mechanical Traction 18#/10\" 30\"/10\" x10'    Charges:  Timed Code Treatment Minutes: 35'   Total Treatment Minutes: 45     [] EVAL (LOW) 54251 (typically 20 minutes face-to-face)  [] EVAL (MOD) 14450 (typically 30 minutes face-to-face)  [] EVAL (HIGH) 06895 (typically 45 minutes face-to-face)  [] RE-EVAL     [] PM(10508) x      [] IONTO  [] NMR (27194) x      [] VASO  [x] Manual (19997) x  2    [] Other:  [] TA x       [x] Mech Traction (19559)  [] ES(attended) (13505)      [] ES (un) (09834):     GOALS:  1. Independent in HEP and progression per patient tolerance, in order to prevent re-injury. 2. Patient will have a decrease in pain to facilitate improvement in movement, function, and ADLs as indicated by Functional Deficits. Long Term Goals: To be achieved in: 6 weeks  1. Disability index score of 15% or less for the NDI to assist with reaching prior level of function. 2. Patient will demonstrate increased AROM to [facility] of cervical/thoracic spine to allow for proper joint functioning as indicated by patients Functional Deficits.    3. Patient will demonstrate an increase in postural awareness and control and activation of  Deep cervical stabilizers to allow for proper functional mobility as indicated by patients Functional Deficits. 4. Patient will return to sitting for more than 1 hour functional activities without increased symptoms or restriction. 5. Patient will be able to sleep thru the night without increase in symptoms. Progression Towards Functional goals:  [x] Patient is progressing as expected towards functional goals listed. [] Progression is slowed due to complexities listed. [] Progression has been slowed due to co-morbidities. [x] Plan just implemented, too soon to assess goals progression  [] Other:     ASSESSMENT:  See eval    Treatment/Activity Tolerance:  [x] Patient tolerated treatment well [] Patient limited by fatique  [] Patient limited by pain  [] Patient limited by other medical complications  [] Other:     Prognosis: [x] Good [] Fair  [] Poor    Patient Requires Follow-up: [x] Yes  [] No  Plan for Next Session:    PLAN: See eval  [x] Continue per plan of care [] Alter current plan (see comments)  [] Plan of care initiated [] Hold pending MD visit [] Discharge    *If patient does not return for further follow ups after this date. Please consider this as the patients discharge from physical therapy.      Electronically signed by: Ambrose Leavitt, [address] [facility] , [place] [address]

## 2018-09-21 ENCOUNTER — APPOINTMENT (OUTPATIENT)
Dept: PHYSICAL THERAPY | Age: 63
End: 2018-09-21
Payer: COMMERCIAL

## 2018-09-21 ENCOUNTER — HOSPITAL ENCOUNTER (OUTPATIENT)
Dept: PHYSICAL THERAPY | Age: 63
Setting detail: THERAPIES SERIES
Discharge: HOME | End: 2018-09-21
Payer: COMMERCIAL

## 2018-09-21 PROCEDURE — 97012 MECHANICAL TRACTION THERAPY: CPT | Performed by: PHYSICAL THERAPIST

## 2018-09-21 PROCEDURE — 97110 THERAPEUTIC EXERCISES: CPT | Performed by: PHYSICAL THERAPIST

## 2018-09-21 PROCEDURE — 97140 MANUAL THERAPY 1/> REGIONS: CPT | Performed by: PHYSICAL THERAPIST

## 2018-09-21 NOTE — FLOWSHEET NOTE
[facility] [address]    Physical Therapy Daily Treatment Note  Date:  2018    Patient Name:  Jsaon Bowman    :  1955  MRN: [id number]  Restrictions/Precautions:    Medical/Treatment Diagnosis Information:  · Diagnosis: M47.812 (ICD-10-CM) - Spondylosis of cervical region without myelopathy or radiculopathy  · Treatment Diagnosis: Neck Pain  Insurance/Certification information:  PT Insurance Information: UMR  Physician Information:  Referring Practitioner: Dr. Mirza Lawson of care signed (Y/N):     Date of Patient follow up with Physician:     G-Code (if applicable):      Date G-Code Applied:         Progress Note: [x]  Yes  []  No  Next due by: Visit #10      Latex Allergy:  [x]NO      []YES  Preferred Language for Healthcare:   [x]English       []other:    Visit # Insurance Allowable   3 60     Auth Required   []  Yes    [x] No    Visits Approved  Date Ranged-       Pain level:  3/10     SUBJECTIVE: Overal improvement stated. Patient states having some really good days and some improvement. OBJECTIVE: See eval  Observation:   Test measurements:      RESTRICTIONS/PRECAUTIONS: OA, HTN    Exercises/Interventions: Script-  Exercise/Equipment Resistance/Repetitions Other/Last Progression          Stretching     Upper Trap Stretch     Levator Scapula Stretch     Scalene Belt Stretch     Bear Hug Stretch      Corner Stretch  . Table Pec Minor Stretch     Cross Arm Stretch            Exercises     Chin Tucks Supine 15x3\"    Headlifts x15 x5 with guidance from PT.     SA Punch      Prone Row/Ext/ HAB/ Scap     TB Rows/ Ext     TB ER/ IR     No Money/ HAB     Cervical Isometrics 10x6\" each Rotation and Extension          Manual     Cervical Traction x4'    Cervical PA Grade-     Cervical Lateral Glide Grade- x4'    Upper Trap Stretch     Levator Scapula Stretch     Thoracic PA Grade-     SOR  x4'    DN  x15'                  Therapeutic Exercise and NMR EXR  [] (18465) Provided verbal/tactile cueing for activities related to strengthening, flexibility, endurance, ROM  for improvements in cervical, postural, scapular, scapulothoracic and UE control with self care, reaching, carrying, lifting, house/yardwork, driving/computer work.    [] (86546) Provided verbal/tactile cueing for activities related to improving balance, coordination, kinesthetic sense, posture, motor skill, proprioception  to assist with cervical, scapular, scapulothoracic and UE control with self care, reaching, carrying, lifting, house/yardwork, driving/computer work. Therapeutic Activities:    [] (84820 or 55934) Provided verbal/tactile cueing for activities related to improving balance, coordination, kinesthetic sense, posture, motor skill, proprioception and motor activation to allow for proper function of cervical, scapular, scapulothoracic and UE control with self care, carrying, lifting, driving/computer work.      Home Exercise Program:    [x] (45684) Reviewed/Progressed HEP activities related to strengthening, flexibility, endurance, ROM of cervical, scapular, scapulothoracic and UE control with self care, reaching, carrying, lifting, house/yardwork, driving/computer work  [] (69144) Reviewed/Progressed HEP activities related to improving balance, coordination, kinesthetic sense, posture, motor skill, proprioception of cervical, scapular, scapulothoracic and UE control with self care, reaching, carrying, lifting, house/yardwork, driving/computer work      Manual Treatments:    [x] (05593) Provided manual therapy to mobilize soft tissue/joints of cervical/CT, scapular GHJ and UE for the purpose of decreasing headache, modulating pain, promoting relaxation,  increasing ROM, reducing/eliminating soft tissue swelling/inflammation/restriction, improving soft tissue extensibility and allowing for proper ROM for normal function with self care, reaching, carrying, lifting, house/yardwork,

## 2018-09-26 ENCOUNTER — HOSPITAL ENCOUNTER (OUTPATIENT)
Dept: PHYSICAL THERAPY | Age: 63
Setting detail: THERAPIES SERIES
Discharge: HOME | End: 2018-09-26
Payer: COMMERCIAL

## 2018-09-26 PROCEDURE — 97140 MANUAL THERAPY 1/> REGIONS: CPT | Performed by: PHYSICAL THERAPIST

## 2018-09-26 PROCEDURE — 97110 THERAPEUTIC EXERCISES: CPT | Performed by: PHYSICAL THERAPIST

## 2018-09-26 PROCEDURE — 97012 MECHANICAL TRACTION THERAPY: CPT | Performed by: PHYSICAL THERAPIST

## 2018-09-26 NOTE — FLOWSHEET NOTE
DN  x15'                  Therapeutic Exercise and NMR EXR  [] (60399) Provided verbal/tactile cueing for activities related to strengthening, flexibility, endurance, ROM  for improvements in cervical, postural, scapular, scapulothoracic and UE control with self care, reaching, carrying, lifting, house/yardwork, driving/computer work.    [] (94245) Provided verbal/tactile cueing for activities related to improving balance, coordination, kinesthetic sense, posture, motor skill, proprioception  to assist with cervical, scapular, scapulothoracic and UE control with self care, reaching, carrying, lifting, house/yardwork, driving/computer work. Therapeutic Activities:    [] (52422 or 38247) Provided verbal/tactile cueing for activities related to improving balance, coordination, kinesthetic sense, posture, motor skill, proprioception and motor activation to allow for proper function of cervical, scapular, scapulothoracic and UE control with self care, carrying, lifting, driving/computer work.      Home Exercise Program:    [x] (32628) Reviewed/Progressed HEP activities related to strengthening, flexibility, endurance, ROM of cervical, scapular, scapulothoracic and UE control with self care, reaching, carrying, lifting, house/yardwork, driving/computer work  [] (41512) Reviewed/Progressed HEP activities related to improving balance, coordination, kinesthetic sense, posture, motor skill, proprioception of cervical, scapular, scapulothoracic and UE control with self care, reaching, carrying, lifting, house/yardwork, driving/computer work      Manual Treatments:    [x] (85441) Provided manual therapy to mobilize soft tissue/joints of cervical/CT, scapular GHJ and UE for the purpose of decreasing headache, modulating pain, promoting relaxation,  increasing ROM, reducing/eliminating soft tissue swelling/inflammation/restriction, improving soft tissue extensibility and allowing for proper ROM for normal function with self care, reaching, carrying, lifting, house/yardwork, driving/computer work  Dry needling manual therapy: consisted on the placement of 6 needles in the following muscles:  left upper trap, levator. A 40 and 50 mm needle was inserted, piston, rotated, and coned to produce intramuscular mobilization. These techniques were used to restore functional range of motion, reduce muscle spasm and induce healing in the corresponding musculature. (11765)  Clean Technique was utilized today while applying Dry needling treatment. The treatment sites where cleaned with 70% solution of  isopropyl alcohol . The PT washed their hands and utilized treatment gloves along with hand  prior to inserting the needles. All needles where removed and discarded in the appropriate sharps container. Modalities:  Mechanical Traction 20#/10\" 30\"/10\" x10'    Charges:  Timed Code Treatment Minutes: 30'   Total Treatment Minutes: 40     [] EVAL (LOW) 55130 (typically 20 minutes face-to-face)  [] EVAL (MOD) 12240 (typically 30 minutes face-to-face)  [] EVAL (HIGH) 24046 (typically 45 minutes face-to-face)  [] RE-EVAL     [x] XC(76568) x  1   [] IONTO  [] NMR (29719) x      [] VASO  [x] Manual (86882) x  1    [] Other:  [] TA x       [x] Mech Traction (13841)  [] ES(attended) (41185)      [] ES (un) (67128):     GOALS:  1. Independent in HEP and progression per patient tolerance, in order to prevent re-injury. 2. Patient will have a decrease in pain to facilitate improvement in movement, function, and ADLs as indicated by Functional Deficits. Long Term Goals: To be achieved in: 6 weeks  1. Disability index score of 15% or less for the NDI to assist with reaching prior level of function. 2. Patient will demonstrate increased AROM to [facility] of cervical/thoracic spine to allow for proper joint functioning as indicated by patients Functional Deficits.    3. Patient will demonstrate an increase in postural awareness and control and activation of  Deep cervical stabilizers to allow for proper functional mobility as indicated by patients Functional Deficits. 4. Patient will return to sitting for more than 1 hour functional activities without increased symptoms or restriction. 5. Patient will be able to sleep thru the night without increase in symptoms. Progression Towards Functional goals:  [x] Patient is progressing as expected towards functional goals listed. [] Progression is slowed due to complexities listed. [] Progression has been slowed due to co-morbidities. [x] Plan just implemented, too soon to assess goals progression  [] Other:     ASSESSMENT:  See eval    Treatment/Activity Tolerance:  [x] Patient tolerated treatment well [] Patient limited by fatique  [] Patient limited by pain  [] Patient limited by other medical complications  [] Other:     Prognosis: [x] Good [] Fair  [] Poor    Patient Requires Follow-up: [x] Yes  [] No  Plan for Next Session:    PLAN: See eval  [x] Continue per plan of care [] Alter current plan (see comments)  [] Plan of care initiated [] Hold pending MD visit [] Discharge    *If patient does not return for further follow ups after this date. Please consider this as the patients discharge from physical therapy.      Electronically signed by: Malinda Kendall Ames [address], [address]

## 2018-11-06 ENCOUNTER — OFFICE VISIT (OUTPATIENT)
Dept: FAMILY MEDICINE CLINIC | Age: 63
End: 2018-11-06
Payer: COMMERCIAL

## 2018-11-06 VITALS
DIASTOLIC BLOOD PRESSURE: 86 MMHG | HEIGHT: 68 IN | SYSTOLIC BLOOD PRESSURE: 130 MMHG | RESPIRATION RATE: 16 BRPM | OXYGEN SATURATION: 98 % | HEART RATE: 60 BPM | BODY MASS INDEX: 25.91 KG/M2 | WEIGHT: 171 LBS

## 2018-11-06 DIAGNOSIS — N52.02 CORPORO-VENOUS OCCLUSIVE ERECTILE DYSFUNCTION: ICD-10-CM

## 2018-11-06 PROBLEM — N52.9 VASCULOGENIC ERECTILE DYSFUNCTION: Status: ACTIVE | Noted: 2018-11-06

## 2018-11-06 PROCEDURE — 99213 OFFICE O/P EST LOW 20 MIN: CPT | Performed by: INTERNAL MEDICINE

## 2018-11-06 RX ORDER — ARGININE HCL 1000 MG
1 TABLET ORAL 3 TIMES DAILY
COMMUNITY
End: 2020-04-01

## 2018-11-06 RX ORDER — SILDENAFIL CITRATE 20 MG/1
20 TABLET ORAL PRN
Qty: 30 TABLET | Refills: 3 | Status: SHIPPED | OUTPATIENT
Start: 2018-11-06 | End: 2020-04-01

## 2018-11-06 ASSESSMENT — ENCOUNTER SYMPTOMS
RESPIRATORY NEGATIVE: 1
GASTROINTESTINAL NEGATIVE: 1
ALLERGIC/IMMUNOLOGIC NEGATIVE: 1

## 2018-11-06 NOTE — PATIENT INSTRUCTIONS
All above problems reviewed and the found to be unchanged except for the following :     Vasculogenic Erectile Dysfunction  - Viagra 20 mg generic. Call if no help. Can increase 40 or 60.

## 2018-11-08 ENCOUNTER — TELEPHONE (OUTPATIENT)
Dept: FAMILY MEDICINE CLINIC | Age: 63
End: 2018-11-08

## 2018-11-08 NOTE — TELEPHONE ENCOUNTER
Patient called wanting a the status of the Sildenafil prescription for prior auth. Is there anyway we can get a status check on that? Was sent on 11/7/18. Please advise.

## 2018-11-12 ENCOUNTER — TELEPHONE (OUTPATIENT)
Dept: FAMILY MEDICINE CLINIC | Age: 63
End: 2018-11-12

## 2018-12-03 RX ORDER — SILDENAFIL 100 MG/1
50 TABLET, FILM COATED ORAL PRN
Qty: 6 TABLET | Refills: 5 | Status: SHIPPED | OUTPATIENT
Start: 2018-12-03 | End: 2019-11-25 | Stop reason: SDUPTHER

## 2018-12-07 ENCOUNTER — OFFICE VISIT (OUTPATIENT)
Dept: FAMILY MEDICINE CLINIC | Age: 63
End: 2018-12-07
Payer: COMMERCIAL

## 2018-12-07 VITALS
OXYGEN SATURATION: 97 % | HEIGHT: 68 IN | TEMPERATURE: 98.7 F | WEIGHT: 171 LBS | HEART RATE: 67 BPM | DIASTOLIC BLOOD PRESSURE: 68 MMHG | SYSTOLIC BLOOD PRESSURE: 136 MMHG | BODY MASS INDEX: 25.91 KG/M2

## 2018-12-07 DIAGNOSIS — J06.9 VIRAL URI: Primary | ICD-10-CM

## 2018-12-07 PROCEDURE — 99213 OFFICE O/P EST LOW 20 MIN: CPT | Performed by: PHYSICIAN ASSISTANT

## 2018-12-07 RX ORDER — BENZONATATE 100 MG/1
100 CAPSULE ORAL 3 TIMES DAILY PRN
Qty: 30 CAPSULE | Refills: 0 | Status: SHIPPED | OUTPATIENT
Start: 2018-12-07 | End: 2018-12-14

## 2019-01-11 ENCOUNTER — PATIENT MESSAGE (OUTPATIENT)
Dept: FAMILY MEDICINE CLINIC | Age: 64
End: 2019-01-11

## 2019-01-11 DIAGNOSIS — I10 ESSENTIAL HYPERTENSION, BENIGN: Primary | ICD-10-CM

## 2019-01-11 DIAGNOSIS — E04.1 THYROID NODULE: ICD-10-CM

## 2019-01-11 DIAGNOSIS — E78.5 DYSLIPIDEMIA (HIGH LDL; LOW HDL): ICD-10-CM

## 2019-01-11 DIAGNOSIS — Z12.5 SCREENING PSA (PROSTATE SPECIFIC ANTIGEN): ICD-10-CM

## 2019-01-15 DIAGNOSIS — E04.1 THYROID NODULE: ICD-10-CM

## 2019-01-15 DIAGNOSIS — Z12.5 SCREENING PSA (PROSTATE SPECIFIC ANTIGEN): ICD-10-CM

## 2019-01-15 DIAGNOSIS — I10 ESSENTIAL HYPERTENSION, BENIGN: ICD-10-CM

## 2019-01-15 DIAGNOSIS — E78.5 DYSLIPIDEMIA (HIGH LDL; LOW HDL): ICD-10-CM

## 2019-01-15 LAB
ALBUMIN SERPL-MCNC: 4.4 G/DL (ref 3.4–5)
ALP BLD-CCNC: 85 U/L (ref 40–129)
ALT SERPL-CCNC: 19 U/L (ref 10–40)
ANION GAP SERPL CALCULATED.3IONS-SCNC: 13 MMOL/L (ref 3–16)
AST SERPL-CCNC: 17 U/L (ref 15–37)
BILIRUB SERPL-MCNC: 0.3 MG/DL (ref 0–1)
BILIRUBIN DIRECT: <0.2 MG/DL (ref 0–0.3)
BILIRUBIN, INDIRECT: NORMAL MG/DL (ref 0–1)
BUN BLDV-MCNC: 20 MG/DL (ref 7–20)
CALCIUM SERPL-MCNC: 9.4 MG/DL (ref 8.3–10.6)
CHLORIDE BLD-SCNC: 104 MMOL/L (ref 99–110)
CHOLESTEROL, TOTAL: 157 MG/DL (ref 0–199)
CO2: 27 MMOL/L (ref 21–32)
CREAT SERPL-MCNC: 1 MG/DL (ref 0.8–1.3)
GFR AFRICAN AMERICAN: >60
GFR NON-AFRICAN AMERICAN: >60
GLUCOSE BLD-MCNC: 98 MG/DL (ref 70–99)
HCT VFR BLD CALC: 46.9 % (ref 40.5–52.5)
HDLC SERPL-MCNC: 43 MG/DL (ref 40–60)
HEMOGLOBIN: 15.6 G/DL (ref 13.5–17.5)
LDL CHOLESTEROL CALCULATED: 91 MG/DL
MCH RBC QN AUTO: 31.7 PG (ref 26–34)
MCHC RBC AUTO-ENTMCNC: 33.4 G/DL (ref 31–36)
MCV RBC AUTO: 95.1 FL (ref 80–100)
PDW BLD-RTO: 14.8 % (ref 12.4–15.4)
PHOSPHORUS: 3.4 MG/DL (ref 2.5–4.9)
PLATELET # BLD: 350 K/UL (ref 135–450)
PMV BLD AUTO: 7.2 FL (ref 5–10.5)
POTASSIUM SERPL-SCNC: 4.6 MMOL/L (ref 3.5–5.1)
PROSTATE SPECIFIC ANTIGEN: 3.33 NG/ML (ref 0–4)
RBC # BLD: 4.93 M/UL (ref 4.2–5.9)
SODIUM BLD-SCNC: 144 MMOL/L (ref 136–145)
TOTAL PROTEIN: 6.5 G/DL (ref 6.4–8.2)
TRIGL SERPL-MCNC: 117 MG/DL (ref 0–150)
TSH SERPL DL<=0.05 MIU/L-ACNC: 2.22 UIU/ML (ref 0.27–4.2)
VLDLC SERPL CALC-MCNC: 23 MG/DL
WBC # BLD: 6.9 K/UL (ref 4–11)

## 2019-01-18 ENCOUNTER — OFFICE VISIT (OUTPATIENT)
Dept: FAMILY MEDICINE CLINIC | Age: 64
End: 2019-01-18
Payer: COMMERCIAL

## 2019-01-18 VITALS
BODY MASS INDEX: 26.01 KG/M2 | RESPIRATION RATE: 16 BRPM | OXYGEN SATURATION: 98 % | HEART RATE: 59 BPM | SYSTOLIC BLOOD PRESSURE: 132 MMHG | DIASTOLIC BLOOD PRESSURE: 80 MMHG | HEIGHT: 68 IN | WEIGHT: 171.6 LBS

## 2019-01-18 DIAGNOSIS — Z00.00 ANNUAL PHYSICAL EXAM: ICD-10-CM

## 2019-01-18 DIAGNOSIS — Z12.11 COLON CANCER SCREENING: Primary | ICD-10-CM

## 2019-01-18 DIAGNOSIS — R35.1 BPH ASSOCIATED WITH NOCTURIA: ICD-10-CM

## 2019-01-18 DIAGNOSIS — I10 ESSENTIAL HYPERTENSION, BENIGN: ICD-10-CM

## 2019-01-18 DIAGNOSIS — E78.5 DYSLIPIDEMIA (HIGH LDL; LOW HDL): ICD-10-CM

## 2019-01-18 DIAGNOSIS — N40.1 BPH ASSOCIATED WITH NOCTURIA: ICD-10-CM

## 2019-01-18 PROCEDURE — 99396 PREV VISIT EST AGE 40-64: CPT | Performed by: INTERNAL MEDICINE

## 2019-01-18 ASSESSMENT — PATIENT HEALTH QUESTIONNAIRE - PHQ9
SUM OF ALL RESPONSES TO PHQ QUESTIONS 1-9: 0
SUM OF ALL RESPONSES TO PHQ9 QUESTIONS 1 & 2: 0
1. LITTLE INTEREST OR PLEASURE IN DOING THINGS: 0
SUM OF ALL RESPONSES TO PHQ QUESTIONS 1-9: 0
2. FEELING DOWN, DEPRESSED OR HOPELESS: 0

## 2019-01-18 ASSESSMENT — ENCOUNTER SYMPTOMS
GASTROINTESTINAL NEGATIVE: 1
RESPIRATORY NEGATIVE: 1
EYES NEGATIVE: 1

## 2019-02-22 RX ORDER — LISINOPRIL 20 MG/1
TABLET ORAL
Qty: 90 TABLET | Refills: 0 | Status: SHIPPED | OUTPATIENT
Start: 2019-02-22 | End: 2019-05-28 | Stop reason: SDUPTHER

## 2019-03-15 ENCOUNTER — TELEPHONE (OUTPATIENT)
Dept: GASTROENTEROLOGY | Age: 64
End: 2019-03-15

## 2019-03-21 RX ORDER — POLYETHYLENE GLYCOL 3350 17 G/17G
POWDER, FOR SOLUTION ORAL
Qty: 238 G | Refills: 0 | Status: ON HOLD | OUTPATIENT
Start: 2019-03-21 | End: 2019-06-07 | Stop reason: ALTCHOICE

## 2019-05-29 RX ORDER — LISINOPRIL 20 MG/1
TABLET ORAL
Qty: 90 TABLET | Refills: 3 | Status: SHIPPED | OUTPATIENT
Start: 2019-05-29 | End: 2019-09-27 | Stop reason: SDUPTHER

## 2019-06-07 ENCOUNTER — HOSPITAL ENCOUNTER (OUTPATIENT)
Age: 64
Setting detail: OUTPATIENT SURGERY
Discharge: HOME | End: 2019-06-07
Attending: INTERNAL MEDICINE | Admitting: INTERNAL MEDICINE
Payer: COMMERCIAL

## 2019-06-07 VITALS
RESPIRATION RATE: 14 BRPM | OXYGEN SATURATION: 97 % | HEART RATE: 54 BPM | SYSTOLIC BLOOD PRESSURE: 101 MMHG | HEIGHT: 68 IN | TEMPERATURE: 97.6 F | DIASTOLIC BLOOD PRESSURE: 65 MMHG | WEIGHT: 172 LBS | BODY MASS INDEX: 26.07 KG/M2

## 2019-06-07 PROCEDURE — 3609010300 HC COLONOSCOPY W/BIOPSY SINGLE/MULTIPLE: Performed by: INTERNAL MEDICINE

## 2019-06-07 PROCEDURE — 2580000003 HC RX 258: Performed by: INTERNAL MEDICINE

## 2019-06-07 PROCEDURE — 99152 MOD SED SAME PHYS/QHP 5/>YRS: CPT | Performed by: INTERNAL MEDICINE

## 2019-06-07 PROCEDURE — 7100000010 HC PHASE II RECOVERY - FIRST 15 MIN: Performed by: INTERNAL MEDICINE

## 2019-06-07 PROCEDURE — 99153 MOD SED SAME PHYS/QHP EA: CPT | Performed by: INTERNAL MEDICINE

## 2019-06-07 PROCEDURE — 2709999900 HC NON-CHARGEABLE SUPPLY: Performed by: INTERNAL MEDICINE

## 2019-06-07 PROCEDURE — 6360000002 HC RX W HCPCS: Performed by: INTERNAL MEDICINE

## 2019-06-07 PROCEDURE — 45380 COLONOSCOPY AND BIOPSY: CPT | Performed by: INTERNAL MEDICINE

## 2019-06-07 PROCEDURE — 7100000011 HC PHASE II RECOVERY - ADDTL 15 MIN: Performed by: INTERNAL MEDICINE

## 2019-06-07 PROCEDURE — 88305 TISSUE EXAM BY PATHOLOGIST: CPT

## 2019-06-07 RX ORDER — MIDAZOLAM HYDROCHLORIDE 5 MG/ML
INJECTION INTRAMUSCULAR; INTRAVENOUS PRN
Status: DISCONTINUED | OUTPATIENT
Start: 2019-06-07 | End: 2019-06-07 | Stop reason: ALTCHOICE

## 2019-06-07 RX ORDER — ASCORBIC ACID 500 MG
500 TABLET ORAL DAILY
COMMUNITY
End: 2022-10-04

## 2019-06-07 RX ORDER — FENTANYL CITRATE 50 UG/ML
INJECTION, SOLUTION INTRAMUSCULAR; INTRAVENOUS PRN
Status: DISCONTINUED | OUTPATIENT
Start: 2019-06-07 | End: 2019-06-07 | Stop reason: ALTCHOICE

## 2019-06-07 RX ORDER — SODIUM CHLORIDE, SODIUM LACTATE, POTASSIUM CHLORIDE, CALCIUM CHLORIDE 600; 310; 30; 20 MG/100ML; MG/100ML; MG/100ML; MG/100ML
INJECTION, SOLUTION INTRAVENOUS CONTINUOUS PRN
Status: COMPLETED | OUTPATIENT
Start: 2019-06-07 | End: 2019-06-07

## 2019-06-07 RX ORDER — SODIUM CHLORIDE, SODIUM LACTATE, POTASSIUM CHLORIDE, CALCIUM CHLORIDE 600; 310; 30; 20 MG/100ML; MG/100ML; MG/100ML; MG/100ML
INJECTION, SOLUTION INTRAVENOUS CONTINUOUS
Status: DISCONTINUED | OUTPATIENT
Start: 2019-06-07 | End: 2019-06-07 | Stop reason: HOSPADM

## 2019-06-07 ASSESSMENT — PAIN - FUNCTIONAL ASSESSMENT: PAIN_FUNCTIONAL_ASSESSMENT: 0-10

## 2019-06-07 NOTE — H&P
[facility]    [address] ,  [address] [facility]  Phone: [phone] [facility] [address],  [address], [facility] [facility]  Phone: [phone]    HPI     Thank you Laurie Lazaro MD for asking me to see Milton Fallon in consultation. He is a  [2] White [1] 58 y.o. . male seen independently who presents with the following GI complaints:    Milton Fallon presents for screening colonoscopy. There is no history of colon polyps or cancer. There is no family history of colon polyps or cancer. He is not experiencing any symptoms presently. Last Encounter Reviewed:   Pertinent PMH, FH, SH is reviewed below.   Last EGD: none  Last Colonoscopy: 2009 normal, NA    [facility] H[facility]ORY     Past Medical History:   Diagnosis Date    Allergic rhinitis     environmental    Arthritis     Hypertension     Kidney stones     had to have lithotripsy    Nosebleeds     Palpitations 2010    frequent PVC's, couplets- seen Cardiologist x 2 in past ? name, improved in last couple years per pt      FAMILY HISTORY     Family History   Problem Relation Age of Onset    High Blood Pressure Mother     Heart Disease Father      SOCIAL HISTORY     Social History     Socioeconomic History    Marital status:      Spouse name: Not on file    Number of children: Not on file    Years of education: Not on file    Highest education level: Not on file   Occupational History    Not on file   Social Needs    Financial resource strain: Not on file    Food insecurity:     Worry: Not on file     Inability: Not on file    Transportation needs:     Medical: Not on file     Non-medical: Not on file   Tobacco Use    Smoking status: Current Some Day Smoker     Packs/day: 0.50     Years: 15.00     Pack years: 7.50     Last attempt to quit: 1/1/2009     Years since quitting: 10.4    Smokeless tobacco: Never Used Substance and Sexual Activity    Alcohol use: Yes     Comment: SOCIAL     Drug use: No    Sexual activity: Yes     Partners: Female   Lifestyle    Physical activity:     Days per week: Not on file     Minutes per session: Not on file    Stress: Not on file   Relationships    Social connections:     Talks on phone: Not on file     Gets together: Not on file     Attends Shinto service: Not on file     Active member of club or organization: Not on file     Attends meetings of clubs or organizations: Not on file     Relationship status: Not on file    Intimate partner violence:     Fear of current or ex partner: Not on file     Emotionally abused: Not on file     Physically abused: Not on file     Forced sexual activity: Not on file   Other Topics Concern    Not on file   Social History Narrative    Not on file     SURGICAL HISTORY     Past Surgical History:   Procedure Laterality Date    COLONOSCOPY  2009    Dr. Maury Mahoney, normal per pt     HERNIA REPAIR      right inguinal     HIP SURGERY      JOINT REPLACEMENT  12/2007    hip replacement- Left     KNEE ARTHROSCOPY  12/2006    LITHOTRIPSY      d/t kidney stones- Dr. Eber Roman  [address]  childhood     CURRENT MEDICATIONS   (This list may include medications prescribed during this encounter as epic can not insert only the list prior to this encounter.)  No current facility-administered medications on file prior to encounter.       Current Outpatient Medications on File Prior to Encounter   Medication Sig Dispense Refill    vitamin C (ASCORBIC ACID) 500 MG tablet Take 500 mg by mouth daily      sildenafil (VIAGRA) 100 MG tablet Take 0.5 tablets by mouth as needed for Erectile Dysfunction 6 tablet 5    L-Arginine 1000 MG TABS Take 1 tablet by mouth three times daily      FOLIC ACID PO Take by mouth      Misc Natural Products (GINSENG COMPLEX PO) Take by mouth      sildenafil (REVATIO) 20 MG tablet Take 1 tablet by mouth as needed (ED) 30 tablet 3    vitamin D (CHOLECALCIFEROL) 1000 UNIT TABS tablet Take 1,000 Units by mouth daily      magnesium gluconate (MAGONATE) 500 MG tablet Take 500 mg by mouth 2 times daily      ibuprofen (ADVIL;MOTRIN) 200 MG tablet Take 400 mg by mouth every 6 hours as needed for Pain.  Omega-3 Fatty Acids (FISH OIL) 1000 MG CAPS Take 1,000 mg by mouth daily.  aspirin 81 MG tablet Take 81 mg by mouth daily.  therapeutic multivitamin-minerals (THERAGRAN-M) tablet Take 1 tablet by mouth daily. ALLERGIES   No Known Allergies  IMMUNIZATIONS     Immunization History   Administered Date(s) Administered    Influenza Vaccine, unspecified formulation 10/21/2015, 11/20/2016, 10/12/2017, 10/18/2018    Influenza Virus Vaccine 01/04/2010, 12/10/2011, 10/01/2012, 10/11/2014    Influenza Whole 01/04/2010, 11/11/2010    Tdap (Boostrix, Adacel) 07/01/2013    Zoster Live (Zostavax) 10/18/2014     REVIEW OF SYSTEMS   See HPI for further details and pertinent postiives. Negative for the following:  Constitutional: Negative for weight change. Negative for appetite change and fatigue. HENT: Negative for nosebleeds, sore throat, mouth sores, and voice change. Respiratory: Negative for cough, choking and chest tightness. Cardiovascular: Negative for chest pain   Gastrointestinal: See HPI  Musculoskeletal: Negative for arthralgias. Skin: Negative for pallor. Neurological: Negative for weakness and light-headedness. Hematological: Negative for adenopathy. Does not bruise/bleed easily. Psychiatric/Behavioral: Negative for suicidal ideas.    PHYSICAL EXAM   VITAL SIGNS:  Vitals:    06/07/19 1006   BP: 139/67   Pulse: 53   Resp: 16   Temp: 97 °F (36.1 °C)   SpO2: 100%     Wt Readings from Last 3 Encounters:   06/07/19 172 lb (78 kg)   01/18/19 171 lb 9.6 oz (77.8 kg)   12/07/18 171 lb (77.6 kg)     Constitutional: Well developed, Well nourished, No acute distress, Non-toxic appearance. HENT: Normocephalic, Atraumatic, Bilateral external ears normal, Oropharynx moist, No oral exudates, Nose normal.   Eyes: Conjunctiva normal, No discharge. Neck: Normal range of motion, No tenderness, Supple, No stridor. Lymphatic: No lymphadenopathy noted. Cardiovascular: Normal heart rate, Normal rhythm, No murmurs, No rubs, No gallops. Thorax & Lungs: Normal breath sounds, No respiratory distress, No wheezing, No chest tenderness. Abdomen: scars consistent with stated surgeries,  Soft NTND   Rectal:  Deferred. Skin: Warm, Dry, No erythema, No rash. Back: No tenderness, No CVA tenderness. Extremities: Intact distal pulses, No edema, No tenderness, No cyanosis, No clubbing. Neurologic: Alert & oriented x 3, Normal motor function, Normal sensory function, No focal deficits noted.    RADIOLOGY/PROCEDURES     Reviewed per [address] reviewed per epic    FOLLOWUP     Screening Colonoscopy          [facility] 6/7/19 10:25 AM    CC:  Saurabh Andres MD

## 2019-06-07 NOTE — OP NOTE
[address] ,  [address] [facility]  Phone: [phone] [facility],  [facility] [address] [facility], [address] [place] [address]  Phone: [phone]   DBI:[phone]    Colonoscopy Procedure Note    Patient: Derl Koyanagi  : 1955    Procedure: Screening Colonoscopy with biopsy    Date:  2019     Endoscopist:  Juan Izaguirre MD    Referring Physician:  Melinda Quesada MD    Preoperative Diagnosis:  Screening Colon    Postoperative Diagnosis:  See impression    Anesthesia: Anesthesia: Moderate Sedation    Sedation: Versed 8 mg IV, fentanyl 100 mcg IV  Start Time: 10:29  Stop Time: 10:51  ASA Class: 2  Mallampati: I (soft palate, uvula, fauces, tonsillar pillars visible)    Procedure Details  Informed consent was obtained for the procedure, including sedation. Risks of perforation, hemorrhage, adverse drug reaction and aspiration were discussed. The patient was placed in the left lateral decubitus position. Based on the pre-procedure assessment, including review of the patient's medical history, medications, allergies, and review of systems, he had been deemed to be an appropriate candidate for sedation; he was therefore sedated with the medications listed below. The patient was monitored continuously with ECG tracing, pulse oximetry, blood pressure monitoring, and direct observations. rectal examination was performed. There were no external hemorrhoids, fissures or skin tags. The colonoscope was inserted into the rectum and advanced under direct vision to the cecum, which was identified by the ileocecal valve and appendiceal orifice which were photographed. The right colon was examined twice as this increases polyp detection especially if other right colon polyps, older age, male, or fontenot syndrome. When segments could not be distended with CO2 or air, it was filled/distended with water. Colonoscope was then slowly withdrawn.   Each colonic segmentwas evaluated carefully. Care was taken to inspect the proximal aspects of the IC valve, haustral folds, as well as flexures. The quality of the colonic preparation was excellent. A careful inspection was made as the colonoscope was withdrawn, including a retroflexed view of the rectum; findings and interventions are described below. Appropriate photodocumentation Was Obtained. Findings: -normal colonic mucosa throughout  -3 mm rectal polyp with a a few pits - biopsy removed. - PREP: miralax  - Overall difficulty: mild in degree  - Abdominal pressure: yes - sigmoid  - Change in position: no  - Anesthesia issues: no  - Medivator use: no    Specimens: Was Not Obtained    Complications:   None; patient tolerated the procedure well. Disposition:   PACU - hemodynamically stable. Withdrawal Time:  8 minutes    Impression:   -Normal colonoscopy to the cecum, with no evidence of neoplasia, diverticular disease, or mucosal abnormality. Recommendations  -Await pathology. , -Repeat colonoscopy in 3-10 years.         ARETHA BERRIOS 6/7/19 10:56 AM

## 2019-07-11 ENCOUNTER — OFFICE VISIT (OUTPATIENT)
Dept: FAMILY MEDICINE CLINIC | Age: 64
End: 2019-07-11
Payer: COMMERCIAL

## 2019-07-11 VITALS
OXYGEN SATURATION: 98 % | RESPIRATION RATE: 16 BRPM | HEIGHT: 68 IN | BODY MASS INDEX: 26.07 KG/M2 | HEART RATE: 64 BPM | WEIGHT: 172 LBS | SYSTOLIC BLOOD PRESSURE: 122 MMHG | DIASTOLIC BLOOD PRESSURE: 64 MMHG

## 2019-07-11 DIAGNOSIS — I47.1 SUPRAVENTRICULAR TACHYCARDIA (HCC): ICD-10-CM

## 2019-07-11 DIAGNOSIS — I10 ESSENTIAL HYPERTENSION, BENIGN: ICD-10-CM

## 2019-07-11 DIAGNOSIS — E78.5 DYSLIPIDEMIA (HIGH LDL; LOW HDL): ICD-10-CM

## 2019-07-11 PROCEDURE — 90471 IMMUNIZATION ADMIN: CPT | Performed by: INTERNAL MEDICINE

## 2019-07-11 PROCEDURE — 99214 OFFICE O/P EST MOD 30 MIN: CPT | Performed by: INTERNAL MEDICINE

## 2019-07-11 PROCEDURE — 90670 PCV13 VACCINE IM: CPT | Performed by: INTERNAL MEDICINE

## 2019-07-11 ASSESSMENT — ENCOUNTER SYMPTOMS
GASTROINTESTINAL NEGATIVE: 1
ALLERGIC/IMMUNOLOGIC NEGATIVE: 1
RESPIRATORY NEGATIVE: 1
RHINORRHEA: 1

## 2019-07-11 NOTE — PROGRESS NOTES
the left side. Femoral pulses are 2+ on the right side, and 2+ on the left side. Popliteal pulses are 2+ on the right side, and 2+ on the left side. Dorsalis pedis pulses are 2+ on the right side, and 2+ on the left side. Posterior tibial pulses are 2+ on the right side, and 2+ on the left side. Pulmonary/Chest: Effort normal and breath sounds normal. No accessory muscle usage. No apnea, no tachypnea and no bradypnea. No respiratory distress. He has no decreased breath sounds. He has no wheezes. He has no rhonchi. He has no rales. Abdominal: Normal appearance and normal aorta. There is no hepatosplenomegaly. There is no CVA tenderness. No hernia. Hernia confirmed negative in the ventral area. Musculoskeletal: He exhibits tenderness (R Shoulder pain w/ decreased ROM/pain). Neurological: He is alert and oriented to person, place, and time. He has normal strength. He is not disoriented. He displays no atrophy and no tremor. No cranial nerve deficit or sensory deficit. He exhibits normal muscle tone. He displays a negative Romberg sign. Coordination normal.   Skin: Skin is warm, dry and intact. No abrasion and no rash noted. He is not diaphoretic. No cyanosis. No pallor. Nails show no clubbing. Psychiatric: He has a normal mood and affect. His speech is normal and behavior is normal. Judgment and thought content normal. Cognition and memory are normal.       Assessment:      Seasonal allergies. Drainage chronic. Supraventricular tachycardia ([address] No new c/o recently   Essential Hypertension, Benign. Good w/ meds. Dyslipidemia (High Ldl; Low Hdl). Good w/ meds. Plan:      All above problems reviewed and the found to be unchanged except for the following :     Supraventricular tachycardia ([address] Good w/ meds. Essential Hypertension, Benign. Continue present meds. Home BP checks. Call if>140/90. Improve diet. Avoid caffeine and salt. Call if new c/o w/ meds. Dyslipidemia (High Ldl; Low Hdl). Much improved diet and wt. No w stable. Seasonal allergies. Claritin,Neti pot, Flonase.     Prevnar 13 today        Ernesto Paez MD

## 2019-07-11 NOTE — ASSESSMENT & PLAN NOTE
No change in meds, no c/o with meds, no chest pain, SOB, palpatations, or syncope. Home bp was good. Weight stable since beginning of yr.

## 2019-07-22 ENCOUNTER — OFFICE VISIT (OUTPATIENT)
Dept: FAMILY MEDICINE CLINIC | Age: 64
End: 2019-07-22
Payer: COMMERCIAL

## 2019-07-22 VITALS
OXYGEN SATURATION: 98 % | HEIGHT: 68 IN | WEIGHT: 172.8 LBS | SYSTOLIC BLOOD PRESSURE: 136 MMHG | DIASTOLIC BLOOD PRESSURE: 82 MMHG | HEART RATE: 66 BPM | BODY MASS INDEX: 26.19 KG/M2

## 2019-07-22 DIAGNOSIS — G89.29 CHRONIC RIGHT SHOULDER PAIN: ICD-10-CM

## 2019-07-22 DIAGNOSIS — M25.511 CHRONIC RIGHT SHOULDER PAIN: ICD-10-CM

## 2019-07-22 DIAGNOSIS — Z01.818 PREOP EXAMINATION: Primary | ICD-10-CM

## 2019-07-22 PROCEDURE — 99242 OFF/OP CONSLTJ NEW/EST SF 20: CPT | Performed by: PHYSICIAN ASSISTANT

## 2019-07-22 PROCEDURE — 93000 ELECTROCARDIOGRAM COMPLETE: CPT | Performed by: PHYSICIAN ASSISTANT

## 2019-07-22 NOTE — PROGRESS NOTES
exudates, gums normal and good dentition. Neck:  Supple, symmetrical, trachea midline, no adenopathy, thyroid symmetric, not enlarged and no tenderness, skin normal  Heart:  Normal apical impulse, regular rate and rhythm, normal S1 and S2, no S3 or S4, and no murmur noted  Lungs:  No increased work of breathing, good air exchange, clear to auscultation bilaterally, no crackles or wheezing  Abdomen:  normal bowel sounds, soft, non-distended, non-tender, no masses palpated, no hepatosplenomegally  Extremities:  No clubbing, cyanosis, or edema      DATA:  EKG:  See attached  ASSESSMENT AND PLAN:    1. Patient is a 71 y.o. male with above specified procedure planned on 7/31/19 with Dr. Sandra Cerda at [facility]. 2. Stop asa and nsaid medications 7-10 days prior to procedure. Ramiro Miles is Medically stable for surgery. Report will be faxed.

## 2019-09-27 RX ORDER — LISINOPRIL 20 MG/1
20 TABLET ORAL DAILY
Qty: 90 TABLET | Refills: 3 | Status: SHIPPED | OUTPATIENT
Start: 2019-09-27 | End: 2020-03-19 | Stop reason: SDUPTHER

## 2019-11-08 ENCOUNTER — OFFICE VISIT (OUTPATIENT)
Dept: FAMILY MEDICINE CLINIC | Age: 64
End: 2019-11-08
Payer: MEDICARE

## 2019-11-08 VITALS
HEART RATE: 68 BPM | SYSTOLIC BLOOD PRESSURE: 158 MMHG | BODY MASS INDEX: 26.92 KG/M2 | WEIGHT: 177.6 LBS | HEIGHT: 68 IN | RESPIRATION RATE: 16 BRPM | DIASTOLIC BLOOD PRESSURE: 84 MMHG

## 2019-11-08 DIAGNOSIS — M48.02 CERVICAL SPINAL STENOSIS: ICD-10-CM

## 2019-11-08 PROCEDURE — G8417 CALC BMI ABV UP PARAM F/U: HCPCS | Performed by: INTERNAL MEDICINE

## 2019-11-08 PROCEDURE — G8482 FLU IMMUNIZE ORDER/ADMIN: HCPCS | Performed by: INTERNAL MEDICINE

## 2019-11-08 PROCEDURE — 99213 OFFICE O/P EST LOW 20 MIN: CPT | Performed by: INTERNAL MEDICINE

## 2019-11-08 PROCEDURE — 4004F PT TOBACCO SCREEN RCVD TLK: CPT | Performed by: INTERNAL MEDICINE

## 2019-11-08 PROCEDURE — 3017F COLORECTAL CA SCREEN DOC REV: CPT | Performed by: INTERNAL MEDICINE

## 2019-11-08 PROCEDURE — G8427 DOCREV CUR MEDS BY ELIG CLIN: HCPCS | Performed by: INTERNAL MEDICINE

## 2019-11-08 PROCEDURE — 1123F ACP DISCUSS/DSCN MKR DOCD: CPT | Performed by: INTERNAL MEDICINE

## 2019-11-08 PROCEDURE — 4040F PNEUMOC VAC/ADMIN/RCVD: CPT | Performed by: INTERNAL MEDICINE

## 2019-11-08 RX ORDER — CYCLOBENZAPRINE HCL 10 MG
10 TABLET ORAL 3 TIMES DAILY PRN
Qty: 30 TABLET | Refills: 0 | Status: SHIPPED | OUTPATIENT
Start: 2019-11-08 | End: 2019-11-08 | Stop reason: SDUPTHER

## 2019-11-08 RX ORDER — IBUPROFEN 800 MG/1
800 TABLET ORAL
Qty: 90 TABLET | Refills: 5 | Status: SHIPPED | OUTPATIENT
Start: 2019-11-08 | End: 2019-11-08 | Stop reason: SDUPTHER

## 2019-11-08 RX ORDER — CYCLOBENZAPRINE HCL 10 MG
10 TABLET ORAL 3 TIMES DAILY PRN
Qty: 30 TABLET | Refills: 0 | Status: SHIPPED | OUTPATIENT
Start: 2019-11-08 | End: 2019-11-18

## 2019-11-08 RX ORDER — METHYLPREDNISOLONE 4 MG/1
TABLET ORAL
Qty: 1 KIT | Refills: 0 | Status: SHIPPED | OUTPATIENT
Start: 2019-11-08 | End: 2019-11-08 | Stop reason: SDUPTHER

## 2019-11-08 RX ORDER — METHYLPREDNISOLONE 4 MG/1
TABLET ORAL
Qty: 1 KIT | Refills: 0 | Status: SHIPPED | OUTPATIENT
Start: 2019-11-08 | End: 2019-11-14

## 2019-11-08 RX ORDER — IBUPROFEN 800 MG/1
800 TABLET ORAL
Qty: 90 TABLET | Refills: 5 | Status: SHIPPED | OUTPATIENT
Start: 2019-11-08

## 2019-11-08 ASSESSMENT — ENCOUNTER SYMPTOMS
RESPIRATORY NEGATIVE: 1
GASTROINTESTINAL NEGATIVE: 1
ALLERGIC/IMMUNOLOGIC NEGATIVE: 1

## 2019-11-25 RX ORDER — SILDENAFIL 100 MG/1
50 TABLET, FILM COATED ORAL PRN
Qty: 6 TABLET | Refills: 5 | Status: SHIPPED | OUTPATIENT
Start: 2019-11-25 | End: 2020-06-15 | Stop reason: SDUPTHER

## 2020-01-10 ENCOUNTER — TELEPHONE (OUTPATIENT)
Dept: FAMILY MEDICINE CLINIC | Age: 65
End: 2020-01-10

## 2020-01-10 DIAGNOSIS — I10 ESSENTIAL HYPERTENSION, BENIGN: Primary | ICD-10-CM

## 2020-01-10 DIAGNOSIS — Z12.5 SCREENING PSA (PROSTATE SPECIFIC ANTIGEN): ICD-10-CM

## 2020-01-10 DIAGNOSIS — E04.1 THYROID NODULE: ICD-10-CM

## 2020-01-10 DIAGNOSIS — E78.5 DYSLIPIDEMIA (HIGH LDL; LOW HDL): ICD-10-CM

## 2020-01-13 ENCOUNTER — OFFICE VISIT (OUTPATIENT)
Dept: FAMILY MEDICINE CLINIC | Age: 65
End: 2020-01-13
Payer: MEDICARE

## 2020-01-13 VITALS
HEART RATE: 56 BPM | WEIGHT: 174 LBS | HEIGHT: 68 IN | RESPIRATION RATE: 18 BRPM | BODY MASS INDEX: 26.37 KG/M2 | SYSTOLIC BLOOD PRESSURE: 134 MMHG | OXYGEN SATURATION: 98 % | DIASTOLIC BLOOD PRESSURE: 84 MMHG

## 2020-01-13 DIAGNOSIS — E04.1 THYROID NODULE: ICD-10-CM

## 2020-01-13 DIAGNOSIS — I10 ESSENTIAL HYPERTENSION, BENIGN: ICD-10-CM

## 2020-01-13 DIAGNOSIS — Z12.5 SCREENING PSA (PROSTATE SPECIFIC ANTIGEN): ICD-10-CM

## 2020-01-13 DIAGNOSIS — E78.5 DYSLIPIDEMIA (HIGH LDL; LOW HDL): ICD-10-CM

## 2020-01-13 LAB
ALBUMIN SERPL-MCNC: 4.2 G/DL (ref 3.4–5)
ALP BLD-CCNC: 72 U/L (ref 40–129)
ALT SERPL-CCNC: 17 U/L (ref 10–40)
ANION GAP SERPL CALCULATED.3IONS-SCNC: 13 MMOL/L (ref 3–16)
AST SERPL-CCNC: 17 U/L (ref 15–37)
BILIRUB SERPL-MCNC: 0.3 MG/DL (ref 0–1)
BILIRUBIN DIRECT: <0.2 MG/DL (ref 0–0.3)
BILIRUBIN, INDIRECT: ABNORMAL MG/DL (ref 0–1)
BUN BLDV-MCNC: 21 MG/DL (ref 7–20)
CALCIUM SERPL-MCNC: 9.7 MG/DL (ref 8.3–10.6)
CHLORIDE BLD-SCNC: 105 MMOL/L (ref 99–110)
CHOLESTEROL, TOTAL: 146 MG/DL (ref 0–199)
CO2: 28 MMOL/L (ref 21–32)
CREAT SERPL-MCNC: 1 MG/DL (ref 0.8–1.3)
GFR AFRICAN AMERICAN: >60
GFR NON-AFRICAN AMERICAN: >60
GLUCOSE BLD-MCNC: 100 MG/DL (ref 70–99)
HCT VFR BLD CALC: 46.1 % (ref 40.5–52.5)
HDLC SERPL-MCNC: 36 MG/DL (ref 40–60)
HEMOGLOBIN: 15.4 G/DL (ref 13.5–17.5)
LDL CHOLESTEROL CALCULATED: 78 MG/DL
MCH RBC QN AUTO: 32 PG (ref 26–34)
MCHC RBC AUTO-ENTMCNC: 33.4 G/DL (ref 31–36)
MCV RBC AUTO: 95.8 FL (ref 80–100)
PDW BLD-RTO: 13.2 % (ref 12.4–15.4)
PHOSPHORUS: 3.5 MG/DL (ref 2.5–4.9)
PLATELET # BLD: 377 K/UL (ref 135–450)
PMV BLD AUTO: 7.2 FL (ref 5–10.5)
POTASSIUM SERPL-SCNC: 4.7 MMOL/L (ref 3.5–5.1)
PROSTATE SPECIFIC ANTIGEN: 6.18 NG/ML (ref 0–4)
RBC # BLD: 4.81 M/UL (ref 4.2–5.9)
SODIUM BLD-SCNC: 146 MMOL/L (ref 136–145)
TOTAL PROTEIN: 6.3 G/DL (ref 6.4–8.2)
TRIGL SERPL-MCNC: 162 MG/DL (ref 0–150)
TSH SERPL DL<=0.05 MIU/L-ACNC: 1.43 UIU/ML (ref 0.27–4.2)
VLDLC SERPL CALC-MCNC: 32 MG/DL
WBC # BLD: 9.1 K/UL (ref 4–11)

## 2020-01-13 PROCEDURE — 1123F ACP DISCUSS/DSCN MKR DOCD: CPT | Performed by: INTERNAL MEDICINE

## 2020-01-13 PROCEDURE — G8482 FLU IMMUNIZE ORDER/ADMIN: HCPCS | Performed by: INTERNAL MEDICINE

## 2020-01-13 PROCEDURE — 3017F COLORECTAL CA SCREEN DOC REV: CPT | Performed by: INTERNAL MEDICINE

## 2020-01-13 PROCEDURE — 4040F PNEUMOC VAC/ADMIN/RCVD: CPT | Performed by: INTERNAL MEDICINE

## 2020-01-13 PROCEDURE — 99214 OFFICE O/P EST MOD 30 MIN: CPT | Performed by: INTERNAL MEDICINE

## 2020-01-13 PROCEDURE — G0402 INITIAL PREVENTIVE EXAM: HCPCS | Performed by: INTERNAL MEDICINE

## 2020-01-13 ASSESSMENT — LIFESTYLE VARIABLES
HOW MANY STANDARD DRINKS CONTAINING ALCOHOL DO YOU HAVE ON A TYPICAL DAY: 0
AUDIT-C TOTAL SCORE: 2
HOW OFTEN DO YOU HAVE SIX OR MORE DRINKS ON ONE OCCASION: 0
HOW OFTEN DO YOU HAVE A DRINK CONTAINING ALCOHOL: 2

## 2020-01-13 ASSESSMENT — ENCOUNTER SYMPTOMS
ALLERGIC/IMMUNOLOGIC NEGATIVE: 1
EYES NEGATIVE: 1
GASTROINTESTINAL NEGATIVE: 1
RESPIRATORY NEGATIVE: 1

## 2020-01-13 ASSESSMENT — PATIENT HEALTH QUESTIONNAIRE - PHQ9
SUM OF ALL RESPONSES TO PHQ QUESTIONS 1-9: 1
SUM OF ALL RESPONSES TO PHQ QUESTIONS 1-9: 1

## 2020-01-13 NOTE — PATIENT INSTRUCTIONS
All above problems reviewed and the found to be unchanged except for the following :     Medicare Welcome Exam. Pneumovax 23 due 7/2020. Supraventricular tachycardia ([address] Call if new c/o. Essential Hypertension, Benign. Continue present meds. Home BP checks. Call if>140/90. Improve diet. Avoid caffeine and salt. Call if new c/o w/ meds. History of Total Hip Replacement, Left. Doing well w/ only mild stiffness and discomfort. Dyslipidemia (High Ldl; Low Hdl). To improve diet and exercise. Anxiety and depression. Continue w/ Counselor. To call if new c/o. Prostate: today  Colonoscopy: 6/2019. DEXA: na  Eye: 12/2019  Hearing: decreased hearing stable. Immunization: up to date. Pneumvax 23 due 7/11/2020. MMSE: na  Get Up and Go: na  Tob: nonsmoker since quit 4/1/2009. ETOH: 1-2 drinks/week  Caffeine: moderate am  Cardiac Risk Assessment: 11% 10 yr risk last yr. Labs pending. Living will: yes  Medical power of : yes    Personalized Preventive Plan for Juana Gregory - 1/13/2020  Medicare offers a range of preventive health benefits. Some of the tests and screenings are paid in full while other may be subject to a deductible, co-insurance, and/or copay. Some of these benefits include a comprehensive review of your medical history including lifestyle, illnesses that may run in your family, and various assessments and screenings as appropriate. After reviewing your medical record and screening and assessments performed today your provider may have ordered immunizations, labs, imaging, and/or referrals for you. A list of these orders (if applicable) as well as your Preventive Care list are included within your After Visit Summary for your review. Other Preventive Recommendations:    · A preventive eye exam performed by an eye specialist is recommended every 1-2 years to screen for glaucoma; cataracts, macular degeneration, and other eye disorders.   · A preventive dental visit is recommended every 6 months. · Try to get at least 150 minutes of exercise per week or 10,000 steps per day on a pedometer . · Order or download the FREE \"Exercise & Physical Activity: Your Everyday Guide\" from [employer]. Call 3-279.977.3802 or search [employer] online. · You need 1684-4332 mg of calcium and 9202-0171 IU of vitamin D per day. It is possible to meet your calcium requirement with diet alone, but a vitamin D supplement is usually necessary to meet this goal.  · When exposed to the sun, use a sunscreen that protects against both UVA and UVB radiation with an SPF of 30 or greater. Reapply every 2 to 3 hours or after sweating, drying off with a towel, or swimming. · Always wear a seat belt when traveling in a car. Always wear a helmet when riding a bicycle or motorcycle.

## 2020-01-13 NOTE — ASSESSMENT & PLAN NOTE
Prostate: today  Colonoscopy: 6/2019. DEXA: na  Eye: 12/2019  Hearing: decreased hearing stable. Immunization: up to date. Pneumvax 23 due 7/11/2020. MMSE: na  Get Up and Go: na  Tob: nonsmoker since quit 4/1/2009. ETOH: 1-2 drinks/week  Caffeine: moderate am  Cardiac Risk Assessment: 11% 10 yr risk last yr. Labs pending.   Living will: yes  Medical power of : yes

## 2020-01-13 NOTE — PROGRESS NOTES
Subjective:      Patient ID: Roni Avila is a 71 y.o. male. HPI  Medicare welcome exam  Prostate: today  Colonoscopy: 6/2019. DEXA: na  Eye: 12/2019  Hearing: decreased hearing stable. Immunization: up to date. Pneumvax 23 due 7/11/2020. MMSE: na  Get Up and Go: na  Tob: nonsmoker since quit 4/1/2009. ETOH: 1-2 drinks/week  Caffeine: moderate am  Cardiac Risk Assessment: 11% 10 yr risk last yr. Labs pending. Living will: yes  Medical power of : yes      Essential hypertension, benign  No change in meds, no c/o with meds, no chest pain, SOB, palpatations, or syncope. Home bp was good. Weight stable since beginning of yr. Supraventricular tachycardia ([address]  No new c/o. No palp. No syncope. BP good. Dyslipidemia (high LDL; low HDL)  Much improved diet and wt stable since last visit but good wt prior to new yr. History of total hip replacement, left  L THR 2007.      Past Medical History:   Diagnosis Date    Allergic rhinitis     environmental    Arthritis     Hypertension     Kidney stones     had to have lithotripsy    Nosebleeds     Palpitations 2010    frequent PVC's, couplets- seen Cardiologist x 2 in past ? name, improved in last couple years per pt      Current Outpatient Medications   Medication Sig Dispense Refill    ibuprofen (ADVIL;MOTRIN) 800 MG tablet Take 1 tablet by mouth 3 times daily (with meals) 90 tablet 5    lisinopril (PRINIVIL;ZESTRIL) 20 MG tablet Take 1 tablet by mouth daily 90 tablet 3    vitamin C (ASCORBIC ACID) 500 MG tablet Take 500 mg by mouth daily      L-Arginine 1000 MG TABS Take 1 tablet by mouth three times daily      FOLIC ACID PO Take by mouth      Misc Natural Products (GINSENG COMPLEX PO) Take by mouth      sildenafil (REVATIO) 20 MG tablet Take 1 tablet by mouth as needed (ED) 30 tablet 3    vitamin D (CHOLECALCIFEROL) 1000 UNIT TABS tablet Take 1,000 Units by mouth daily      ibuprofen (ADVIL;MOTRIN) 200 MG tablet Take 400 mg by mouth every 6 hours as needed for Pain.  Omega-3 Fatty Acids (FISH OIL) 1000 MG CAPS Take 1,000 mg by mouth daily.  aspirin 81 MG tablet Take 81 mg by mouth daily.  therapeutic multivitamin-minerals (THERAGRAN-M) tablet Take 1 tablet by mouth daily.  sildenafil (VIAGRA) 100 MG tablet Take 0.5 tablets by mouth as needed for Erectile Dysfunction 6 tablet 5     No current facility-administered medications for this visit. No Known Allergies  Social History     Tobacco Use    Smoking status: Current Some Day Smoker     Packs/day: 0.50     Years: 15.00     Pack years: 7.50     Last attempt to quit: 2009     Years since quittin.0    Smokeless tobacco: Never Used   Substance Use Topics    Alcohol use: Yes     Comment: SOCIAL      Family History   Problem Relation Age of Onset    High Blood Pressure Mother     Heart Disease Father          Review of Systems   Constitutional: Negative. HENT: Negative. Eyes: Negative. Respiratory: Negative. Cardiovascular: Negative. Gastrointestinal: Negative. Endocrine: Negative. Genitourinary: Negative. Musculoskeletal: Positive for arthralgias. Skin: Negative. Allergic/Immunologic: Negative. Neurological: Negative. Hematological: Negative. Psychiatric/Behavioral: Negative. Vitals:    20 1410 20 1450   BP: (!) 142/80 134/84   Pulse: 56    Resp: 18    SpO2: 98%    Weight: 174 lb (78.9 kg)    Height: 5' 8\" (1.727 m)      Objective:   Physical Exam  Constitutional:       General: He is not in acute distress. Appearance: Normal appearance. He is well-developed. He is not ill-appearing, toxic-appearing or diaphoretic. HENT:      Head: Normocephalic and atraumatic. Right Ear: Hearing, tympanic membrane, ear canal and external ear normal. There is no impacted cerumen. Left Ear: Hearing, tympanic membrane, ear canal and external ear normal. There is no impacted cerumen.       Nose: Nose Nerves: No cranial nerve deficit. Sensory: No sensory deficit. Motor: No weakness, tremor, atrophy, abnormal muscle tone or seizure activity. Coordination: Coordination normal.      Gait: Gait normal.      Deep Tendon Reflexes: Reflexes are normal and symmetric. Reflexes normal. Babinski sign absent on the right side. Babinski sign absent on the left side. Reflex Scores:       Tricep reflexes are 2+ on the right side and 2+ on the left side. Bicep reflexes are 2+ on the right side and 2+ on the left side. Brachioradialis reflexes are 2+ on the right side and 2+ on the left side. Patellar reflexes are 2+ on the right side and 2+ on the left side. Achilles reflexes are 2+ on the right side and 2+ on the left side. Psychiatric:         Mood and Affect: Mood normal.         Speech: Speech normal.         Behavior: Behavior normal.         Thought Content: Thought content normal.         Judgment: Judgment normal.         Assessment:      Problem   Medicare Welcome Exam   Supraventricular tachycardia ([address] Stable w/ meds. Essential Hypertension, Benign. Good w/ meds. History of Total Hip Replacement, Left. Stable    Dyslipidemia (High Ldl; Low Hdl). Stable diet and wt. Anxiety and Depression. Stable. Plan:      All above problems reviewed and the found to be unchanged except for the following :     Medicare Welcome Exam. Pneumovax 23 due 7/2020. Supraventricular tachycardia ([address] Call if new c/o. Essential Hypertension, Benign. Continue present meds. Home BP checks. Call if>140/90. Improve diet. Avoid caffeine and salt. Call if new c/o w/ meds. History of Total Hip Replacement, Left. Doing well w/ only mild stiffness and discomfort. Dyslipidemia (High Ldl; Low Hdl). To improve diet and exercise. Anxiety and depression. Continue w/ Counselor. To call if new c/o.            Candace Doyle MD   Medicare Annual Wellness Visit  Name: Adalid Vazquez Arthritis     Hypertension     Kidney stones     had to have lithotripsy    Nosebleeds     Palpitations 2010    frequent PVC's, couplets- seen Cardiologist x 2 in past ? name, improved in last couple years per pt        Past Surgical History:   Procedure Laterality Date    COLONOSCOPY  2009    Dr. Yong Malcolm, normal per pt     COLONOSCOPY N/A 6/7/2019    COLONOSCOPY WITH BIOPSY performed by Gume Spicer MD at [address]      right inguinal    [address] REPLACEMENT  12/2007    hip replacement- Left     KNEE ARTHROSCOPY  12/2006    LITHOTRIPSY      d/t kidney stones- Dr. Hernandez Dear  1980's    TONSILLECTOMY  childhood       Family History   Problem Relation Age of Onset    High Blood Pressure Mother     Heart Disease Father        CareTeam (Including outside providers/suppliers regularly involved in providing care):   Patient Care Team:  Echo Doshi MD as PCP - General (Internal Medicine)  Echo Doshi MD as PCP - [facility] Empaneled Provider  Chelsea Howard MD as Consulting Physician (Otolaryngology)    Wt Readings from Last 3 Encounters:   01/13/20 174 lb (78.9 kg)   11/08/19 177 lb 9.6 oz (80.6 kg)   07/22/19 172 lb 12.8 oz (78.4 kg)     Vitals:    01/13/20 1410   BP: (!) 142/80   Pulse: 56   Resp: 18   SpO2: 98%   Weight: 174 lb (78.9 kg)   Height: 5' 8\" (1.727 m)     Body mass index is 26.46 kg/m². Based upon direct observation of the patient, evaluation of cognition reveals recent and remote memory intact. See H&P above. Patient's complete Health Risk Assessment and screening values have been reviewed and are found in Flowsheets. The following problems were reviewed today and where indicated follow up appointments were made and/or referrals ordered.     Positive Risk Factor Screenings with Interventions:     Substance Abuse:  Social History     Tobacco History     Smoking Status  Current Some Day Smoker Last Topic Date Due    Hepatitis C screen  1955    HIV screen  06/23/1969    Annual Wellness Visit (AWV)  11/15/2019    Potassium monitoring  01/15/2020    Creatinine monitoring  01/15/2020    Pneumococcal 65+ years Vaccine (2 of 2 - PPSV23) 07/11/2020    DTaP/Tdap/Td vaccine (2 - Td) 07/01/2023    Lipid screen  01/15/2024    Colon cancer screen colonoscopy  06/07/2029    Flu vaccine  Completed    Shingles Vaccine  Completed    AAA screen  Completed     Recommendations for Preventive Services Due: see orders and patient instructions/AVS.  . Recommended screening schedule for the next 5-10 years is provided to the patient in written form: see Patient Instructions/AVS.    Rowena Landis was seen today for medicare awv.     Diagnoses and all orders for this visit:    Medicare welcome exam    Essential hypertension, benign    Supraventricular tachycardia ([address]    Dyslipidemia (high LDL; low HDL)    History of total hip replacement, left

## 2020-01-14 ENCOUNTER — TELEPHONE (OUTPATIENT)
Dept: FAMILY MEDICINE CLINIC | Age: 65
End: 2020-01-14

## 2020-01-14 RX ORDER — CIPROFLOXACIN 500 MG/1
500 TABLET, FILM COATED ORAL 2 TIMES DAILY
Qty: 20 TABLET | Refills: 0 | Status: SHIPPED | OUTPATIENT
Start: 2020-01-14 | End: 2020-01-24

## 2020-01-14 NOTE — TELEPHONE ENCOUNTER
See pended rx  Notes recorded by Giselle Mcintosh MD on 1/13/2020 at 7:46 PM EST  Labs are essentially normal other than PSA (Prostate blood test)going up from 3.33 to 6.18 in 1 yr. To begin Cipro 500 mg twice daily for 10 days and repeat PSA. If still high then to Urology for further evaluation.

## 2020-01-24 ENCOUNTER — TELEPHONE (OUTPATIENT)
Dept: FAMILY MEDICINE CLINIC | Age: 65
End: 2020-01-24

## 2020-02-05 NOTE — PATIENT INSTRUCTIONS
All above problems reviewed and the found to be unchanged except for the following :     Cervical Neck Pain With Evidence of Disc Disease. To Dr Fiorella Mcfarland for further w/u and tx. [address]raventricular tachycardia ([facility] [address] Call if new c/o. Essential Hypertension, Benign. Continue present meds. Home BP checks. Call if>140/90. Improve diet. Avoid caffeine and salt. Call if new c/o w/ meds. no

## 2020-02-08 DIAGNOSIS — R97.20 ELEVATED PSA: ICD-10-CM

## 2020-02-08 LAB — PROSTATE SPECIFIC ANTIGEN: 4.62 NG/ML (ref 0–4)

## 2020-02-18 ENCOUNTER — OFFICE VISIT (OUTPATIENT)
Dept: ORTHOPEDIC SURGERY | Age: 65
End: 2020-02-18
Payer: MEDICARE

## 2020-02-18 VITALS
WEIGHT: 173.94 LBS | HEART RATE: 76 BPM | BODY MASS INDEX: 26.36 KG/M2 | SYSTOLIC BLOOD PRESSURE: 121 MMHG | DIASTOLIC BLOOD PRESSURE: 70 MMHG | HEIGHT: 68 IN

## 2020-02-18 PROCEDURE — 4004F PT TOBACCO SCREEN RCVD TLK: CPT | Performed by: PHYSICAL MEDICINE & REHABILITATION

## 2020-02-18 PROCEDURE — 1123F ACP DISCUSS/DSCN MKR DOCD: CPT | Performed by: PHYSICAL MEDICINE & REHABILITATION

## 2020-02-18 PROCEDURE — 3017F COLORECTAL CA SCREEN DOC REV: CPT | Performed by: PHYSICAL MEDICINE & REHABILITATION

## 2020-02-18 PROCEDURE — G8417 CALC BMI ABV UP PARAM F/U: HCPCS | Performed by: PHYSICAL MEDICINE & REHABILITATION

## 2020-02-18 PROCEDURE — 4040F PNEUMOC VAC/ADMIN/RCVD: CPT | Performed by: PHYSICAL MEDICINE & REHABILITATION

## 2020-02-18 PROCEDURE — 99214 OFFICE O/P EST MOD 30 MIN: CPT | Performed by: PHYSICAL MEDICINE & REHABILITATION

## 2020-02-18 PROCEDURE — G8482 FLU IMMUNIZE ORDER/ADMIN: HCPCS | Performed by: PHYSICAL MEDICINE & REHABILITATION

## 2020-02-18 PROCEDURE — G8427 DOCREV CUR MEDS BY ELIG CLIN: HCPCS | Performed by: PHYSICAL MEDICINE & REHABILITATION

## 2020-02-18 RX ORDER — METHYLPREDNISOLONE 4 MG/1
TABLET ORAL
Qty: 1 KIT | Refills: 0 | Status: SHIPPED | OUTPATIENT
Start: 2020-02-18 | End: 2020-04-01

## 2020-02-18 NOTE — PROGRESS NOTES
Follow up: Roro Yeh  1955  [id number]         Chief Complaint   Patient presents with    Neck Pain     F/U CSP lov 8/2018    Arm Pain     F/U L ARM - radiates into hand. HISTORY OF PRESENT ILLNESS:  Mr. Matilde Wing is a 71 y.o. male returns for a follow up visit for multiple medical problems. His current presenting problems are   1. Cervical spondylosis without myelopathy    2. Foraminal stenosis of cervical region    3. DDD (degenerative disc disease), cervical    .    As per information/history obtained from the PADT(patient assessment and documentation tool) - He complains of pain in the neck with radiation to the shoulders Left He rates the pain 8/10 and describes it as sharp. Pain is made worse by: movement. He denies side effects from the current pain regimen. Patient reports that since the last follow up visit the physical functioning is worse, family/social relationships are worse, mood is worse and sleep patterns are worse, and that the overall functioning is worse. Patient denies neurological bowel or bladder. He underwent left cervical medial branch blocks at the left C3-4-5 and 6 levels in August 2018. This gave him significant relief of his neck pain up until recently. He denies having any weakness in the upper extremities. He believes it may have been doing planks that bothered his symptoms.         Associated signs and symptoms:   Neurogenic bowel or bladder symptoms:  no   Perceived weakness:  no   Difficulty walking:  no              Past Medical History:   Past Medical History:   Diagnosis Date    Allergic rhinitis     environmental    Arthritis     Hypertension     Kidney stones     had to have lithotripsy    Nosebleeds     Palpitations 2010    frequent PVC's, couplets- seen Cardiologist x 2 in past ? name, improved in last couple years per pt       Past Surgical History:     Past Surgical History:   Procedure Laterality Date    COLONOSCOPY  2009     [id number], normal per pt     COLONOSCOPY N/A 6/7/2019    COLONOSCOPY WITH BIOPSY performed by Deepa Corona MD at [address]      right inguinal     HIP SURGERY      JOINT REPLACEMENT  12/2007    hip replacement- Left     KNEE ARTHROSCOPY  12/2006    LITHOTRIPSY      d/t kidney stones- Dr. Candi Ko TOE SURGERY  1980's    TONSILLECTOMY  childhood     Current Medications:     Current Outpatient Medications:     CYCLOBENZAPRINE HCL PO, Take by mouth, Disp: , Rfl:     GABAPENTIN PO, Take by mouth, Disp: , Rfl:     ZINC PO, Take by mouth, Disp: , Rfl:     NONFORMULARY, Indications: CBD HEMP OIL , Disp: , Rfl:     methylPREDNISolone (MEDROL, IRAJ,) 4 MG tablet, Take by mouth as directed., Disp: 1 kit, Rfl: 0    sildenafil (VIAGRA) 100 MG tablet, Take 0.5 tablets by mouth as needed for Erectile Dysfunction, Disp: 6 tablet, Rfl: 5    ibuprofen (ADVIL;MOTRIN) 800 MG tablet, Take 1 tablet by mouth 3 times daily (with meals), Disp: 90 tablet, Rfl: 5    lisinopril (PRINIVIL;ZESTRIL) 20 MG tablet, Take 1 tablet by mouth daily, Disp: 90 tablet, Rfl: 3    vitamin C (ASCORBIC ACID) 500 MG tablet, Take 500 mg by mouth daily, Disp: , Rfl:     L-Arginine 1000 MG TABS, Take 1 tablet by mouth three times daily, Disp: , Rfl:     FOLIC ACID PO, Take by mouth, Disp: , Rfl:     sildenafil (REVATIO) 20 MG tablet, Take 1 tablet by mouth as needed (ED), Disp: 30 tablet, Rfl: 3    vitamin D (CHOLECALCIFEROL) 1000 UNIT TABS tablet, Take 1,000 Units by mouth daily, Disp: , Rfl:     ibuprofen (ADVIL;MOTRIN) 200 MG tablet, Take 400 mg by mouth every 6 hours as needed for Pain., Disp: , Rfl:     Omega-3 Fatty Acids (FISH OIL) 1000 MG CAPS, Take 1,000 mg by mouth daily. , Disp: , Rfl:     aspirin 81 MG tablet, Take 81 mg by mouth daily. , Disp: , Rfl:   Allergies:  Patient has no known allergies. Social History:    reports that he has been smoking.  He has a 7.50 pack-year smoking history. He has never used smokeless tobacco. He reports current alcohol use. He reports that he does not use drugs. Family History:   Family History   Problem Relation Age of Onset    High Blood Pressure Mother     Heart Disease Father        REVIEW OF SYSTEMS:   CONSTITUTIONAL: Denies unexplained weight loss, fevers, chills or fatigue  NEUROLOGICAL: Denies unsteady gait or progressive weakness  MUSCULOSKELETAL: Denies joint swelling or redness  GI: Denies nausea, vomiting, diarrhea   : Denies bowel or bladder issues       PHYSICAL EXAM:    Vitals: Blood pressure 121/70, pulse 76, height 5' 7.99\" (1.727 m), weight 173 lb 15.1 oz (78.9 kg). GENERAL EXAM:  · General Apparence: Patient is adequately groomed with no evidence of malnutrition. · Psychiatric: Orientation: The patient is oriented to time, place and person. The patient's mood and affect are appropriate   · Vascular: Examination reveals no swelling and palpation reveals no tenderness in upper or lower extremities. Good capillary refill. · The lymphatic examination of the neck, axillae and groin reveals all areas to be without enlargement or induration  · Sensation is intact without deficit in the upper and lower extremities to light touch and pinprick  · Coordination of the upper and lower extremities are normal.    CERVICAL EXAMINATION:  · Inspection: Local inspection shows no step-off or bruising. Cervical alignment is normal. No instability is noted. · Palpation and Percussion: No evidence of tenderness at the midline. Paraspinal tenderness is not present. There is no paraspinal spasm. Skin:There are no rashes, ulcerations or lesions  · Range of Motion:  Increased pain with rotation lateral bending to the left side   · Strength: 5/5 bilateral upper extremities  · Special Tests:   Spurling's and Flores's are negative bilaterally. Rutledge and Impingement tests are negative bilaterally.   · Skin:There are no rashes, ulcerations or lesions in right & left upper extremities. · Reflexes: Bilaterally triceps, biceps and brachioradialis are 2+. Clonus absent bilaterally at the feet. No pathological reflexes are noted. · Gait & station: normal, patient ambulates without assistance and no ataxia  · Additional Examinations:  · RIGHT UPPER EXTREMITY:  Inspection/examination of the right upper extremity does not show any tenderness, deformity or injury. Range of motion is unremarkable and pain-free. There is no gross instability. There are no rashes, ulcerations or lesions. Strength and tone are normal. No atrophy or abnormal movements are noted. · LEFT UPPER EXTREMITY: Inspection/examination of the left upper extremity does not show any tenderness, deformity or injury. Range of motion is unremarkable and pain-free. There is no gross instability. There are no rashes, ulcerations or lesions. Strength and tone are normal. No atrophy or abnormal movements are noted. · Additional Examinations:  · RIGHT LOWER EXTREMITY: Inspection/examination of the right lower extremity does not show any tenderness, deformity or injury. Range of motion is normal and pain-free. There is no gross instability. There are no rashes, ulcerations or lesions. Strength and tone are normal. No atrophy or abnormal movements are noted. · LEFT LOWER EXTREMITY:  Inspection/examination of the left lower extremity does not show any tenderness, deformity or injury. Range of motion is normal and pain-free. There is no gross instability. There are no rashes, ulcerations or lesions. Strength and tone are normal. No atrophy or abnormal movements are noted. Diagnostic Testing:    MR cervical spine shows 5/2017  Moderate multilevel degenerative changes worse at C3-C4 where there is right central and    foraminal disc protrusion causing mild cord impingement. Multilevel prominent foraminal    narrowing as described. Small focal lesion in the right thyroid, correlate clinically. Results for orders placed or performed in visit on 20   PSA, Prostatic Specific Antigen   Result Value Ref Range    PSA 4.62 (H) 0.00 - 4.00 ng/mL     Impression:       1. Cervical spondylosis without myelopathy    2. Foraminal stenosis of cervical region    3. DDD (degenerative disc disease), cervical        Plan:  Clinical Course: Above diagnoses are worsening    I discussed the diagnosis and the treatment options with Rojas Fall today. In Summary:  The various treatment options were outlined and discussed with Rojas Fall including:  Conservative care options: physical therapy, ice, medications, bracing, and activity modification. The indications for therapeutic injections. The indications for additional imaging/laboratory studies. The indications for (possible future) interventions. After considering the various options discussed, Rojas Fall elected to pursue a course of treatment that includes the followin. Medications:  I will prescribe a medrol dose pack to help with the inflammatory component of pain. Risks, benefits and alternatives were discussed. Recommended to stop any NSAIDs to reduce risk of GI bleed during the course of the dose pack. 2. PT:  Encouraged to continue with Home exercise program.    3. Further studies: No further studies. 4. Interventional: We discussed  Proceeding with cervical medial branch blocks at the left C3-C4-C5 and C6 levels. Risks benefits alternatives were discussed. He would like to proceed with repeat cervical medial branch blocks    5. Follow up:  4-6 weeks      Rojas Fall was instructed to call the office if his symptoms worsen or if new symptoms appear prior to the next scheduled visit. He is specifically instructed to contact the office between now & his scheduled appointment if he has concerns related to his condition or if he needs assistance in scheduling the above tests.  He is welcome to call for an appointment sooner if he has any additional concerns or questions. Steve Redd. Camryn Nunez MD, DEVIN, [facility]  Board Certified in [address] [place] [address] Certified and Fellowship Trained in [facility] ([facility])             This dictation was performed with a verbal recognition program [facility]) and it was checked for errors. It is possible that there are still dictated errors within this office note. If so, please bring any errors to my attention for an addendum. All efforts were made to ensure that this office note is accurate.

## 2020-02-18 NOTE — LETTER
Please schedule the following with:     Date:   03/10/20 @ 8:15    Account: [de-identified]  Patient: Oskar Aguiar    : 1955  Address:  [address] [facility]    Phone (H):  [phone] (home)      ----------------------------------------------------------------------------------------------  Diagnosis:     ICD-10-CM    1. Cervical spondylosis without myelopathy M47.812    2. Foraminal stenosis of cervical region M48.02    3. DDD (degenerative disc disease), cervical M50.30          Levels:Left C3, C4, C5 and C6 #2   medial branch blocks  CPT Codes 86725, 09765    ----------------------------------------------------------------------------------------------  Injection # MBB 2 (#1 18) [email or web address]    Attending Physician       Samra Cardozo. Mara Doran MD.  ----------------------------------------------------------------------------------------------  Injection Scheduled For:    At:    1st Insurance [facility]    Pre-Cert#    2nd 1 [employer] Pre-Cert#    Comments or Special instructions:    · Infection control  ? Tested positive for MRSA in past 12 months:  no  ? Tested positive for MSSA \"staph infection\" in past 12 months: no  ? Tested positive for VRE (Vancomycin Resistant Enterococci) in past 12 months:   no  ? Currently on any antibiotics for an infection: no  · Anticoagulants:  ? On a blood thinner:  ASA   ?  Any history of bleeding disorder: no   · Advanced Liver disease: no   · Advanced Renal disease: no   · Glaucoma: no   · Diabetes: no      Sedation:         No  -----------------------------------------------------------------------------------------------  No Known Allergies

## 2020-02-21 ENCOUNTER — TELEPHONE (OUTPATIENT)
Dept: ORTHOPEDIC SURGERY | Age: 65
End: 2020-02-21

## 2020-02-21 RX ORDER — GABAPENTIN 100 MG/1
CAPSULE ORAL
Qty: 120 CAPSULE | Refills: 0 | Status: SHIPPED | OUTPATIENT
Start: 2020-02-21 | End: 2020-03-22 | Stop reason: ALTCHOICE

## 2020-02-21 NOTE — TELEPHONE ENCOUNTER
S/W PATIENT discussed medication options. Will send over Gabapentin to preferred pharmacy. Discussed pain rx policy with patient as well. Patient voiced understanding of the conversation and will contact the office with further questions or concerns.

## 2020-02-25 ENCOUNTER — OFFICE VISIT (OUTPATIENT)
Dept: ORTHOPEDIC SURGERY | Age: 65
End: 2020-02-25
Payer: MEDICARE

## 2020-02-25 VITALS
SYSTOLIC BLOOD PRESSURE: 161 MMHG | WEIGHT: 173.94 LBS | BODY MASS INDEX: 26.36 KG/M2 | HEART RATE: 75 BPM | HEIGHT: 68 IN | DIASTOLIC BLOOD PRESSURE: 97 MMHG

## 2020-02-25 PROCEDURE — G8427 DOCREV CUR MEDS BY ELIG CLIN: HCPCS | Performed by: PHYSICAL MEDICINE & REHABILITATION

## 2020-02-25 PROCEDURE — 1123F ACP DISCUSS/DSCN MKR DOCD: CPT | Performed by: PHYSICAL MEDICINE & REHABILITATION

## 2020-02-25 PROCEDURE — 3017F COLORECTAL CA SCREEN DOC REV: CPT | Performed by: PHYSICAL MEDICINE & REHABILITATION

## 2020-02-25 PROCEDURE — G8482 FLU IMMUNIZE ORDER/ADMIN: HCPCS | Performed by: PHYSICAL MEDICINE & REHABILITATION

## 2020-02-25 PROCEDURE — 99214 OFFICE O/P EST MOD 30 MIN: CPT | Performed by: PHYSICAL MEDICINE & REHABILITATION

## 2020-02-25 PROCEDURE — 4004F PT TOBACCO SCREEN RCVD TLK: CPT | Performed by: PHYSICAL MEDICINE & REHABILITATION

## 2020-02-25 PROCEDURE — G8417 CALC BMI ABV UP PARAM F/U: HCPCS | Performed by: PHYSICAL MEDICINE & REHABILITATION

## 2020-02-25 PROCEDURE — 4040F PNEUMOC VAC/ADMIN/RCVD: CPT | Performed by: PHYSICAL MEDICINE & REHABILITATION

## 2020-02-25 RX ORDER — PREDNISONE 10 MG/1
TABLET ORAL
Qty: 26 TABLET | Refills: 0 | Status: SHIPPED | OUTPATIENT
Start: 2020-02-25 | End: 2020-04-01

## 2020-02-25 RX ORDER — TRAMADOL HYDROCHLORIDE 50 MG/1
50 TABLET ORAL EVERY 8 HOURS PRN
Qty: 21 TABLET | Refills: 0 | Status: SHIPPED | OUTPATIENT
Start: 2020-02-25 | End: 2020-03-03

## 2020-02-25 NOTE — PROGRESS NOTES
Follow up: Maria D [place]  1955  [id number]         Chief Complaint   Patient presents with    Neck Pain     F/u CSP. MBB scheduled 3/10/20    Hand Pain     New onset left hand weakness         HISTORY OF PRESENT ILLNESS:  Mr. Shaye Wall is a 71 y.o. male returns for a follow up visit for multiple medical problems. His current presenting problems are   1. Left hand weakness    2. Cervical spondylosis without myelopathy    3. Foraminal stenosis of cervical region    . As per information/history obtained from the PADT(patient assessment and documentation tool) - He complains of pain in the neck with radiation to the arms Left He rates the pain 10/10 and describes it as numbness. Pain is made worse by: movement. He denies side effects from the current pain regimen. Patient reports that since the last follow up visit the physical functioning is worse, family/social relationships are worse, mood is worse and sleep patterns are worse, and that the overall functioning is worse. Patient denies neurological bowel or bladder. He developed a 5 day history of weakness in the left hand and has difficulty typing, holding objects. He has pain from the left elbow to the left hand along the ulnar distribution and some median distribution.          Associated signs and symptoms:   Neurogenic bowel or bladder symptoms:  no   Perceived weakness:  yes   Difficulty walking:  no              Past Medical History:   Past Medical History:   Diagnosis Date    Allergic rhinitis     environmental    Arthritis     Hypertension     Kidney stones     had to have lithotripsy    Mucoid cyst of joint     Nosebleeds     Palpitations 2010    frequent PVC's, couplets- seen Cardiologist x 2 in past ? name, improved in last couple years per pt       Past Surgical History:     Past Surgical History:   Procedure Laterality Date    COLONOSCOPY  2009    Dr. Deneen Gilliland, normal per pt     COLONOSCOPY N/A 6/7/2019 COLONOSCOPY WITH BIOPSY performed by Dami Mota MD at [facility]      right inguinal     HIP SURGERY      JOINT REPLACEMENT  12/2007    hip replacement- Left     KNEE ARTHROSCOPY  12/2006    LITHOTRIPSY      d/t kidney stones- Dr. Anastacio Renee TOE SURGERY  1980's    TONSILLECTOMY  childhood     Current Medications:     Current Outpatient Medications:     gabapentin (NEURONTIN) 100 MG capsule, Take 1 capsule by mouth in the morning, take 3 capsules by mouth at night., Disp: 120 capsule, Rfl: 0    CYCLOBENZAPRINE HCL PO, Take by mouth, Disp: , Rfl:     ZINC PO, Take by mouth, Disp: , Rfl:     NONFORMULARY, Indications: CBD HEMP OIL , Disp: , Rfl:     methylPREDNISolone (MEDROL, IRAJ,) 4 MG tablet, Take by mouth as directed., Disp: 1 kit, Rfl: 0    sildenafil (VIAGRA) 100 MG tablet, Take 0.5 tablets by mouth as needed for Erectile Dysfunction, Disp: 6 tablet, Rfl: 5    ibuprofen (ADVIL;MOTRIN) 800 MG tablet, Take 1 tablet by mouth 3 times daily (with meals), Disp: 90 tablet, Rfl: 5    lisinopril (PRINIVIL;ZESTRIL) 20 MG tablet, Take 1 tablet by mouth daily, Disp: 90 tablet, Rfl: 3    vitamin C (ASCORBIC ACID) 500 MG tablet, Take 500 mg by mouth daily, Disp: , Rfl:     L-Arginine 1000 MG TABS, Take 1 tablet by mouth three times daily, Disp: , Rfl:     FOLIC ACID PO, Take by mouth, Disp: , Rfl:     sildenafil (REVATIO) 20 MG tablet, Take 1 tablet by mouth as needed (ED), Disp: 30 tablet, Rfl: 3    vitamin D (CHOLECALCIFEROL) 1000 UNIT TABS tablet, Take 1,000 Units by mouth daily, Disp: , Rfl:     ibuprofen (ADVIL;MOTRIN) 200 MG tablet, Take 400 mg by mouth every 6 hours as needed for Pain., Disp: , Rfl:     Omega-3 Fatty Acids (FISH OIL) 1000 MG CAPS, Take 1,000 mg by mouth daily. , Disp: , Rfl:     aspirin 81 MG tablet, Take 81 mg by mouth daily. , Disp: , Rfl:   Allergies:  Patient has no known allergies. Social History:    reports that he has been smoking.  He are no rashes, ulcerations or lesions in right & left upper extremities. · Reflexes: Bilaterally triceps, biceps and brachioradialis are 1+. Clonus absent bilaterally at the feet. No pathological reflexes are noted. · Gait & station: normal, patient ambulates without assistance and no ataxia  · Additional Examinations:  · RIGHT UPPER EXTREMITY:  Inspection/examination of the right upper extremity does not show any tenderness, deformity or injury. Range of motion is unremarkable and pain-free. There is no gross instability. There are no rashes, ulcerations or lesions. Strength and tone are normal. No atrophy or abnormal movements are noted. · LEFT UPPER EXTREMITY: Inspection/examination of the left upper extremity does not show any tenderness, deformity or injury. Range of motion is unremarkable and pain-free. There is no gross instability. There are no rashes, ulcerations or lesions. Strength is abnormal and tone is normal. No atrophy or abnormal movements are noted. · Additional Examinations:  · RIGHT LOWER EXTREMITY: Inspection/examination of the right lower extremity does not show any tenderness, deformity or injury. Range of motion is normal and pain-free. There is no gross instability. There are no rashes, ulcerations or lesions. Strength and tone are normal. No atrophy or abnormal movements are noted. · LEFT LOWER EXTREMITY:  Inspection/examination of the left lower extremity does not show any tenderness, deformity or injury. Range of motion is normal and pain-free. There is no gross instability. There are no rashes, ulcerations or lesions. Strength and tone are normal. No atrophy or abnormal movements are noted. Diagnostic Testing:    No new diagnostics  Results for orders placed or performed in visit on 02/08/20   PSA, Prostatic Specific Antigen   Result Value Ref Range    PSA 4.62 (H) 0.00 - 4.00 ng/mL     Impression:       1. Left hand weakness    2.  Cervical spondylosis without myelopathy 3. Foraminal stenosis of cervical region        Plan:  Clinical Course: Above diagnoses are worsening    I discussed the diagnosis and the treatment options with Oskar Aguiar today. In Summary:  The various treatment options were outlined and discussed with Oskar Aguiar including:  Conservative care options: physical therapy, ice, medications, bracing, and activity modification. The indications for therapeutic injections. The indications for additional imaging/laboratory studies. The indications for (possible future) interventions. After considering the various options discussed, Oskar Aguiar elected to pursue a course of treatment that includes the followin. Medications:  . OARRS/ANNABEL reviewed and appropriate. Low risk on ORT. 4 A's reviewed. Tramadol 5 mg po tid#21  Informed verbal consent was obtained. Goals of the current treatment regimen include: improvement in pain, improvement in overall in physical performance, ability to perform daily activities, work or disability, emotional distress, health care utilization and decreased medication consumption. Progress will be monitored towards achieving/maintaining therapeutic goals:    1. Improving perceived interference of pain with ADL's, ability to perform HEP, continue to improve in overall flexibility, ROM, strength and endurance, ability to do household activities indoor and/or outdoor, work and social/leisure activities. Improve psychosocial and physical functioning. 2. Improving sleep to 6-7 hours a night. Improve mood/ anxiety and depression symptoms    3. Reduction of reliance on opioid analgesia/more appropriate opioid use. Utilization of adjuvant medications as appropriate. Risks and benefits of the medications and alternative treatments have been discussed with the patient.  The patient was advised against drinking alcohol with the opioid pain medicines, advised against driving or handling machinery when

## 2020-02-27 ENCOUNTER — OFFICE VISIT (OUTPATIENT)
Dept: ORTHOPEDIC SURGERY | Age: 65
End: 2020-02-27
Payer: MEDICARE

## 2020-02-27 PROCEDURE — 95886 MUSC TEST DONE W/N TEST COMP: CPT | Performed by: PHYSICAL MEDICINE & REHABILITATION

## 2020-02-27 PROCEDURE — 95909 NRV CNDJ TST 5-6 STUDIES: CPT | Performed by: PHYSICAL MEDICINE & REHABILITATION

## 2020-02-27 NOTE — PROGRESS NOTES
[address] MEDICINE      Patient: Juana Gregory Age: 71 Years 6 Months  Sex: Male Date: 2/27/2020  YOB: 1955 Ref. Phys.: Dr Jazmyn Gaspar  Notes:  left radiating arm pain with hand weakness; recurrent symptoms      Sensory NCS      Nerve / Sites Peak PeakAmp Dist Williams    ms µV cm m/s   L MEDIAN - D2 ULNAR D5   1. Median Wrist 4.15 22.0 14 50.0   2. Ulnar Wrist 4.00 5.3 14 40.6   L MEDIAN - RADIAL THUMB   1. Median Wrist       2. Radial Wrist 2.60 24.4 10 52.6       Motor NCS      Nerve / Sites Lat Amp Amp Dist Williams    ms mV % cm m/s   L MEDIAN - APB   1. Wrist 3.20 6.1 100 8    2. Elbow 7.40 7.8 129 23 54.8   3. Axilla 9.65 3.7 60.4 10 44.4       EMG Summary Table     Spontaneous MUAP Recruit. Ins. Act Fibs. PSW Fasics. H.F. Amp. Dur. Poly's. Pattern   L. FIRST D INTEROSS N None None None None ++ ++ ++ -   L. ABD POLL BREVIS N None None None None ++ ++ ++ N   L. EXT DIG COMM N None None None None + ++ ++ N   L. PRON TERES N None None None None N N N N   L. BICEPS N None None None None N N N N   L. TRICEPS N None None None None N N N N   L. CERV PSP (L) N None None None None N N N N       Summary: Distal SNAP latencies are slowed due to the patients cool limb temperature despite repeated attempts at warming. Left ulnar CMAP conduction velocity is slowed across the elbow. Left ulnar SNAP amplitude is low. Monopolar exam reveals large amplitude polyphasic prolonged duration motor units to left C8 anterior myotomes. Impression: Abnormal examination. There is electrodiagnostic evidence of:    1. Findings suggestive of a mild to moderate chronic left C8 radiculopathy. Correlate with cervical MRI. 2. 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 Left lower trunk brachial plexopathy cannot be completely excluded.               MD Rayn

## 2020-02-28 ENCOUNTER — HOSPITAL ENCOUNTER (OUTPATIENT)
Dept: MRI IMAGING | Age: 65
Discharge: HOME | End: 2020-02-28
Payer: MEDICARE

## 2020-02-28 PROCEDURE — 72141 MRI NECK SPINE W/O DYE: CPT

## 2020-03-03 ENCOUNTER — TELEPHONE (OUTPATIENT)
Dept: ORTHOPEDIC SURGERY | Age: 65
End: 2020-03-03

## 2020-03-03 NOTE — TELEPHONE ENCOUNTER
Patient underwent MRI CSP on 2/28/2020 and EMG LUE with FCV on 2/27/2020, please advise on further indications for patient as he has a procedure scheduled for 3/10/2020 - CMBB #2. Message printed and given to provider for review. S/W PATIENT discussed findings regarding imaging/testing per provider. Per provider will change 3/10/2020 procedure to C7/T1 IL to help with symptoms more appropriately. Patient voiced understanding of the conversation and will contact the office with further questions or concerns. Please place injection letter and route to PSS.

## 2020-03-06 ENCOUNTER — TELEPHONE (OUTPATIENT)
Dept: ORTHOPEDIC SURGERY | Age: 65
End: 2020-03-06

## 2020-03-06 NOTE — TELEPHONE ENCOUNTER
DOS   03/10/2020  CPT   73187   66673   76614.26  06366  DX   M47.812   M48.02  M50.30  OP SX AUTH  NPR    LEFT  LEVELS   C3  C4  C5  C6   PROCEDURE   MEDIAL BRANCH BLOCKS    [address]:   MEDICARE

## 2020-03-10 ENCOUNTER — TELEPHONE (OUTPATIENT)
Dept: ORTHOPEDIC SURGERY | Age: 65
End: 2020-03-10

## 2020-03-10 ENCOUNTER — HOSPITAL ENCOUNTER (OUTPATIENT)
Age: 65
Setting detail: OUTPATIENT SURGERY
Discharge: HOME | End: 2020-03-10
Attending: PHYSICAL MEDICINE & REHABILITATION | Admitting: PHYSICAL MEDICINE & REHABILITATION
Payer: MEDICARE

## 2020-03-10 VITALS
BODY MASS INDEX: 26.07 KG/M2 | SYSTOLIC BLOOD PRESSURE: 126 MMHG | DIASTOLIC BLOOD PRESSURE: 77 MMHG | HEIGHT: 68 IN | WEIGHT: 172 LBS | RESPIRATION RATE: 16 BRPM | HEART RATE: 59 BPM | OXYGEN SATURATION: 98 % | TEMPERATURE: 98.2 F

## 2020-03-10 PROCEDURE — 6360000002 HC RX W HCPCS: Performed by: PHYSICAL MEDICINE & REHABILITATION

## 2020-03-10 PROCEDURE — 7100000011 HC PHASE II RECOVERY - ADDTL 15 MIN: Performed by: PHYSICAL MEDICINE & REHABILITATION

## 2020-03-10 PROCEDURE — 2709999900 HC NON-CHARGEABLE SUPPLY: Performed by: PHYSICAL MEDICINE & REHABILITATION

## 2020-03-10 PROCEDURE — 6360000004 HC RX CONTRAST MEDICATION: Performed by: PHYSICAL MEDICINE & REHABILITATION

## 2020-03-10 PROCEDURE — 2500000003 HC RX 250 WO HCPCS: Performed by: PHYSICAL MEDICINE & REHABILITATION

## 2020-03-10 PROCEDURE — 3600000012 HC SURGERY LEVEL 2 ADDTL 15MIN: Performed by: PHYSICAL MEDICINE & REHABILITATION

## 2020-03-10 PROCEDURE — 3600000002 HC SURGERY LEVEL 2 BASE: Performed by: PHYSICAL MEDICINE & REHABILITATION

## 2020-03-10 PROCEDURE — 7100000010 HC PHASE II RECOVERY - FIRST 15 MIN: Performed by: PHYSICAL MEDICINE & REHABILITATION

## 2020-03-10 PROCEDURE — 2580000003 HC RX 258: Performed by: PHYSICAL MEDICINE & REHABILITATION

## 2020-03-10 PROCEDURE — 99152 MOD SED SAME PHYS/QHP 5/>YRS: CPT | Performed by: PHYSICAL MEDICINE & REHABILITATION

## 2020-03-10 RX ORDER — LIDOCAINE HYDROCHLORIDE 10 MG/ML
INJECTION, SOLUTION EPIDURAL; INFILTRATION; INTRACAUDAL; PERINEURAL PRN
Status: DISCONTINUED | OUTPATIENT
Start: 2020-03-10 | End: 2020-03-10 | Stop reason: ALTCHOICE

## 2020-03-10 RX ORDER — MIDAZOLAM HYDROCHLORIDE 1 MG/ML
INJECTION INTRAMUSCULAR; INTRAVENOUS PRN
Status: DISCONTINUED | OUTPATIENT
Start: 2020-03-10 | End: 2020-03-10 | Stop reason: ALTCHOICE

## 2020-03-10 RX ORDER — DEXAMETHASONE SODIUM PHOSPHATE 10 MG/ML
INJECTION, SOLUTION INTRAMUSCULAR; INTRAVENOUS PRN
Status: DISCONTINUED | OUTPATIENT
Start: 2020-03-10 | End: 2020-03-10 | Stop reason: ALTCHOICE

## 2020-03-10 RX ORDER — SODIUM CHLORIDE, SODIUM LACTATE, POTASSIUM CHLORIDE, CALCIUM CHLORIDE 600; 310; 30; 20 MG/100ML; MG/100ML; MG/100ML; MG/100ML
INJECTION, SOLUTION INTRAVENOUS ONCE
Status: COMPLETED | OUTPATIENT
Start: 2020-03-10 | End: 2020-03-10

## 2020-03-10 RX ADMIN — SODIUM CHLORIDE, POTASSIUM CHLORIDE, SODIUM LACTATE AND CALCIUM CHLORIDE: 600; 310; 30; 20 INJECTION, SOLUTION INTRAVENOUS at 08:33

## 2020-03-10 RX ADMIN — LIDOCAINE HYDROCHLORIDE 0.1 ML: 10 INJECTION, SOLUTION EPIDURAL; INFILTRATION; INTRACAUDAL; PERINEURAL at 08:33

## 2020-03-10 ASSESSMENT — PAIN SCALES - GENERAL: PAINLEVEL_OUTOF10: 0

## 2020-03-10 ASSESSMENT — PAIN - FUNCTIONAL ASSESSMENT: PAIN_FUNCTIONAL_ASSESSMENT: 0-10

## 2020-03-10 ASSESSMENT — PAIN DESCRIPTION - DESCRIPTORS: DESCRIPTORS: STABBING;ACHING;CONSTANT

## 2020-03-10 NOTE — POST SEDATION
Sedation Post Procedure Note    Patient Name: Joe Flores   YOB: 1955  Room/Bed: EG OR/NONE  Medical Record Number: [id number]  Date: 3/10/2020   Time: 9:52 AM         Physicians/Assistants:  Alyson Matute MD    Procedure Performed:  GUNJAN    Post-Sedation Vital Signs:  Vitals:    03/10/20 0907   BP: 126/77   Pulse: 59   Resp: 16   Temp:    SpO2: 98%      Vital signs were reviewed and were stable after the procedure (see flow sheet for vitals)            Post-Sedation Exam: Lungs: clear           Complications: none    Electronically signed by Alyson Matute MD on 3/10/2020 at 9:52 AM

## 2020-03-10 NOTE — PROGRESS NOTES
Pt arrived to [facility] from sedation,no c/o pain and/or numbness or tingling, vitals stable, site unremarkable

## 2020-03-10 NOTE — TELEPHONE ENCOUNTER
L/M FOR PATIENT with appointment information - per provider instruction - for 3/12/2020 at [facility] at 3:45pm. Patient was asked to contact the office if this is not convenient for him.

## 2020-03-10 NOTE — OP NOTE
Patient:  Nohemi Villegas  YOB: 1955  Medical Record #:  [id number]   Place: [address] [facility], [address] [place] [address]  Date:  3/10/2020   Physician:  Panda Kaplan MD, DEVIN    Procedure:  Cervical Epidural Interlaminar Steroid Injection  C7 - T1    Pre-Procedure Diagnosis:  Cervical radiculopathy    Post-Procedure Diagnosis: Same    Sedation: Local with 1% Lidocaine 3 ml and 2 mg of IV Versed    EBL: None    Complications: None    Procedure Summary:    The patient was seen in the office for complaints of neck and arm pain. Review of the imaging and physical exam of the patient confirmed the pre-procedure diagnosis. After a thorough discussion of risks, benefits and alternatives informed consent was obtained. The patient was brought to the procedure suite and placed in the prone position. The skin overlying the cervical spine was prepped and draped in the usual sterile fashion. Using fluoroscopic guidance, the C7 - T1 interlaminar space was identified. Through anesthetized skin, a 22 gauge Touhy needle was advanced into the epidural space using continuous loss of resistance to saline technique. Isovue M300 was instilled and an epidurogram was noted without evidence of vascular or intrathecal spread. Following which, 5 ml of a solution containing preservative free normal saline and 10 mg of PF dexamethasone was instilled. The needle was removed and a band-aid applied. The patient was transferred to the post-operative suite in stable condition.

## 2020-03-12 ENCOUNTER — OFFICE VISIT (OUTPATIENT)
Dept: ORTHOPEDIC SURGERY | Age: 65
End: 2020-03-12
Payer: MEDICARE

## 2020-03-12 VITALS
HEIGHT: 68 IN | SYSTOLIC BLOOD PRESSURE: 131 MMHG | BODY MASS INDEX: 26.22 KG/M2 | DIASTOLIC BLOOD PRESSURE: 87 MMHG | WEIGHT: 173 LBS

## 2020-03-12 PROCEDURE — G8417 CALC BMI ABV UP PARAM F/U: HCPCS | Performed by: PHYSICAL MEDICINE & REHABILITATION

## 2020-03-12 PROCEDURE — 99214 OFFICE O/P EST MOD 30 MIN: CPT | Performed by: PHYSICAL MEDICINE & REHABILITATION

## 2020-03-12 PROCEDURE — 4040F PNEUMOC VAC/ADMIN/RCVD: CPT | Performed by: PHYSICAL MEDICINE & REHABILITATION

## 2020-03-12 PROCEDURE — 3017F COLORECTAL CA SCREEN DOC REV: CPT | Performed by: PHYSICAL MEDICINE & REHABILITATION

## 2020-03-12 PROCEDURE — G8427 DOCREV CUR MEDS BY ELIG CLIN: HCPCS | Performed by: PHYSICAL MEDICINE & REHABILITATION

## 2020-03-12 PROCEDURE — 1123F ACP DISCUSS/DSCN MKR DOCD: CPT | Performed by: PHYSICAL MEDICINE & REHABILITATION

## 2020-03-12 PROCEDURE — G8482 FLU IMMUNIZE ORDER/ADMIN: HCPCS | Performed by: PHYSICAL MEDICINE & REHABILITATION

## 2020-03-12 PROCEDURE — 4004F PT TOBACCO SCREEN RCVD TLK: CPT | Performed by: PHYSICAL MEDICINE & REHABILITATION

## 2020-03-12 RX ORDER — TRAMADOL HYDROCHLORIDE 50 MG/1
50 TABLET ORAL EVERY 8 HOURS PRN
Qty: 21 TABLET | Refills: 0 | Status: SHIPPED | OUTPATIENT
Start: 2020-03-12 | End: 2020-03-19

## 2020-03-12 NOTE — PROGRESS NOTES
noted.      Diagnostic Testing:    No new diagnostics  Results for orders placed or performed in visit on 20   PSA, Prostatic Specific Antigen   Result Value Ref Range    PSA 4.62 (H) 0.00 - 4.00 ng/mL     Impression:       1. Cervical radiculopathy    2. Left hand weakness    3. Cervical spondylosis without myelopathy    4. Foraminal stenosis of cervical region        Plan:  Clinical Course: Above diagnoses are worsening    I discussed the diagnosis and the treatment options with Pradip Desai today. In Summary:  The various treatment options were outlined and discussed with Pradip Desai including:  Conservative care options: physical therapy, ice, medications, bracing, and activity modification. The indications for therapeutic injections. The indications for additional imaging/laboratory studies. The indications for (possible future) interventions. After considering the various options discussed, Pradip Giselle elected to pursue a course of treatment that includes the followin. Medications:  . OARRS reviewed and appropriate. Low risk on ORT. 4 A's reviewed. Tramadol 50 mg po tid#21  Informed verbal consent was obtained. Goals of the current treatment regimen include: improvement in pain, improvement in overall in physical performance, ability to perform daily activities, work or disability, emotional distress, health care utilization and decreased medication consumption. Progress will be monitored towards achieving/maintaining therapeutic goals:    1. Improving perceived interference of pain with ADL's, ability to perform HEP, continue to improve in overall flexibility, ROM, strength and endurance, ability to do household activities indoor and/or outdoor, work and social/leisure activities. Improve psychosocial and physical functioning. 2. Improving sleep to 6-7 hours a night. Improve mood/ anxiety and depression symptoms    3.  Reduction of reliance on opioid analgesia/more appropriate opioid use. Utilization of adjuvant medications as appropriate. Risks and benefits of the medications and alternative treatments have been discussed with the patient. The patient was advised against drinking alcohol with the opioid pain medicines, advised against driving or handling machinery when starting or adjusting the dose of medicines, feeling groggy or drowsy, or if having any cognitive issues related to the current medications. The patient is fully aware of the risk of overdose and death, if medicines are misused and not taken as prescribed. If the patient develops new symptoms or if the symptoms worsen, the patient was told to call the office. No flowsheet data found. 2. PT:  I will start the patient on a trial of PT to work on a cervical stabilization program to focus on stretching, strengthening, traction and modalities as indicated. 3. Further studies: Referral to spine surgery      4. Interventional:  He underwent a GUNJAN 2 days ago. 5. Follow up:  4-6 weeks      Oskar Aguiar was instructed to call the office if his symptoms worsen or if new symptoms appear prior to the next scheduled visit. He is specifically instructed to contact the office between now & his scheduled appointment if he has concerns related to his condition or if he needs assistance in scheduling the above tests. He is welcome to call for an appointment sooner if he has any additional concerns or questions. Fabiana GARZA ATC, am scribing for and in the presence of Dr. Marc Alston.  03/13/20 8:10 AM Fabiana Herzog. The physical examination was performed between the patient and Dr. Marc Altson. All counseling during the appointment was performed between the patient and the provider. Dr. Marc GARZA, personally performed the services described in this documentation as scribed by Fabiana Epps ATC in my presence and it is both accurate and complete. Adithya Ramirez. Martin Cardenas MD, DEVIN, [facility]  Board Certified in [address] [place] [address]  Certified and Fellowship Trained in [facility] ([facility])             This dictation was performed with a verbal recognition program [facility]) and it was checked for errors. It is possible that there are still dictated errors within this office note. If so, please bring any errors to my attention for an addendum. All efforts were made to ensure that this office note is accurate.

## 2020-03-15 ENCOUNTER — PATIENT MESSAGE (OUTPATIENT)
Dept: FAMILY MEDICINE CLINIC | Age: 65
End: 2020-03-15

## 2020-03-16 ENCOUNTER — TELEPHONE (OUTPATIENT)
Dept: ORTHOPEDIC SURGERY | Age: 65
End: 2020-03-16

## 2020-03-16 ENCOUNTER — HOSPITAL ENCOUNTER (OUTPATIENT)
Dept: PHYSICAL THERAPY | Age: 65
Setting detail: THERAPIES SERIES
Discharge: HOME | End: 2020-03-16
Payer: MEDICARE

## 2020-03-16 PROCEDURE — 97161 PT EVAL LOW COMPLEX 20 MIN: CPT | Performed by: PHYSICAL THERAPIST

## 2020-03-16 PROCEDURE — 97012 MECHANICAL TRACTION THERAPY: CPT | Performed by: PHYSICAL THERAPIST

## 2020-03-16 PROCEDURE — 97140 MANUAL THERAPY 1/> REGIONS: CPT | Performed by: PHYSICAL THERAPIST

## 2020-03-16 PROCEDURE — 97110 THERAPEUTIC EXERCISES: CPT | Performed by: PHYSICAL THERAPIST

## 2020-03-16 NOTE — TELEPHONE ENCOUNTER
From: Rojas Fall  To: Echo Doshi MD  Sent: 3/15/2020 1:02 PM EDT  Subject: Prescription Question    Hello, I'm having difficulty keeping my BP below the 147/87 range most likely due to the return of extreme pain assoc. with cervical spinal stenosis. I had an MRI and EMG followed by cervical epidural via Dr. Bob Leonard on March 10th when my BP spiked at 180/95 (not sure about the lower number) and the nurse said the spike is probably due to so much pain and nerves about the procedure. Since that day I'm seeing upper numbers in the 145 to 160 range. Please advise if I should renew the Lisiniprol for 20mg/day or get something stronger. I've also been referred to Dr. Silvino Brandt to evaluate hand surgery (no feeling in my left pinky or ring finger) on 3/17 and Dr. Jass Billings neck surgeon on 3/25.

## 2020-03-16 NOTE — TELEPHONE ENCOUNTER
Patient sends Enjoyor message below. Please call patient with questions. He will be notified via Enjoyor that our office will contact him to address these questions.           \"Thank you for seeing me on 3/12.  I would like some clarification as to why I would not be a candidate for RFA ablation or stem cell/bone marrow therapy as described in Dr. Munir Zimmer patient options printed overview and her seminar that I attended on .  I'm scheduled w/ Dr Tenzin Glasgow on 3/17 and Dr. Nahomy Rodriguez on 3/25 per Dr. Linus Gates referral but would also like clarification on RFA and stem cell therapy before those appointments because they seem sensible and preferable to surgical options.  If getting answers requires another office visit please let me know and I'll make an appointment. Solange Coy  54 phone [phone] Thank You!\"

## 2020-03-16 NOTE — TELEPHONE ENCOUNTER
The patient will continue with the current plan of care as he has referrals to hand surgery and also to the spine surgeon. The patient will keep these appointments which are in the next 2 weeks. We discussed that stem cells for the neck are not appropriate at this time however we could consider a possible radiofrequency ablation after 2 medial branch blocks are completed.   I would like the patient to see these surgical referrals first.

## 2020-03-17 ENCOUNTER — OFFICE VISIT (OUTPATIENT)
Dept: ORTHOPEDIC SURGERY | Age: 65
End: 2020-03-17
Payer: MEDICARE

## 2020-03-17 ENCOUNTER — HOSPITAL ENCOUNTER (OUTPATIENT)
Dept: PHYSICAL THERAPY | Age: 65
Setting detail: THERAPIES SERIES
Discharge: HOME | End: 2020-03-17
Payer: MEDICARE

## 2020-03-17 VITALS — HEIGHT: 68 IN | WEIGHT: 173.06 LBS | BODY MASS INDEX: 26.23 KG/M2

## 2020-03-17 PROBLEM — G56.22 CUBITAL TUNNEL SYNDROME ON LEFT: Status: ACTIVE | Noted: 2020-03-17

## 2020-03-17 PROCEDURE — G8482 FLU IMMUNIZE ORDER/ADMIN: HCPCS | Performed by: ORTHOPAEDIC SURGERY

## 2020-03-17 PROCEDURE — 97140 MANUAL THERAPY 1/> REGIONS: CPT | Performed by: PHYSICAL THERAPIST

## 2020-03-17 PROCEDURE — 99203 OFFICE O/P NEW LOW 30 MIN: CPT | Performed by: ORTHOPAEDIC SURGERY

## 2020-03-17 PROCEDURE — G8427 DOCREV CUR MEDS BY ELIG CLIN: HCPCS | Performed by: ORTHOPAEDIC SURGERY

## 2020-03-17 PROCEDURE — G8417 CALC BMI ABV UP PARAM F/U: HCPCS | Performed by: ORTHOPAEDIC SURGERY

## 2020-03-17 PROCEDURE — 97012 MECHANICAL TRACTION THERAPY: CPT | Performed by: PHYSICAL THERAPIST

## 2020-03-17 PROCEDURE — 97110 THERAPEUTIC EXERCISES: CPT | Performed by: PHYSICAL THERAPIST

## 2020-03-17 NOTE — PLAN OF CARE
The [facility] and [address] [facility]  [address], [place], [address] [facility]  Phone: [phone]   Fax:     [phone]                                                       Physical Therapy Certification    Dear Laverne Melendez Practitioner: Dr. Chanda Arias,,    We had the pleasure of evaluating the following patient for physical therapy services at [address] [place] [address]. A summary of our findings can be found in the initial assessment below. This includes our plan of care. If you have any questions or concerns regarding these findings, please do not hesitate to contact me at the office phone number checked above. Thank you for the referral.       Physician Signature:_______________________________Date:__________________  By signing above (or electronic signature), therapists plan is approved by physician      Patient: Je White   : 1955   MRN: [id number]  Referring Physician: Referring Practitioner: Dr. Chanda Arias      Evaluation Date: 3/17/2020      Medical Diagnosis Information:  Diagnosis: M54.12 (ICD-10-CM) - Cervical radiculopathy   Treatment Diagnosis: M54.2 Neck Pain                                         Insurance information: PT Insurance Information: not available at the time of evaluation     Precautions/ Contra-indications: none  Latex Allergy:  [x]NO      []YES  Preferred Language for Healthcare:   [x]English       []other:    SUBJECTIVE:   This patient presents with neck and radiating left upper extremity pain. This has been going on for 2 weeks. He has had XRAY, MRI, LORENZO and steroid dose pack. Also now on Tramadol and Gabapentin. History of same 2 years ago that resolved after 8 months with therapy that he indicates was helpful and included corner stretch, cervical retraction, upper trap stretch and home traction devices.     Relevant Medical History:HTN, OA  Functional Disability Index:54%    Pain Scale: Lowest 7/10, highest 8/10    Easing factors:   - rest  - medication  Provocative factors:    - sitting  - standing  - walking  - activity and movement    Night Pain:  - denied pain at night    Type:     - Intermittent     Paresthesia complaints:   - paresthesia complaints in C7 distribution. Functional Limitations/Impairments:   - Standing   - Walking      - Squatting/bending    - Stairs             - ADL's     Occupation/School:    - sedentary occupation     Sport/recreational activities:    - resistive training   - cardiovascular training      Living Status/Prior Level of Function: This patient was independent in ADL's and IADL's prior to onset of symptoms. PACEMAKER:  - Denied having a pacemaker that would contraindicate the use of electrical modalities. METAL IMPLANTS:  - Denied metal implants that would contraindicate the use of thermal modalities. CANCER HISTORY:  - Denied a history of cancer that would contraindicate thermal modalities. OBJECTIVE:   ROM:  Pain and Limitation Pain  Limitation of movement   Flexion [x]Yes  []No   [] No  []Mild   [x]Moderate  []Severe   Extension [x]Yes  []No   [] No  []Mild   [x]Moderate  []Severe    Lateral flexion R []Yes  [x]No   [] No  []Mild   [x]Moderate  []Severe    Lateral flexion L [x]Yes  []No   [] No  []Mild   []Moderate  [x]Severe   Rotation R []Yes  [x]No   [] No  []Mild   [x]Moderate  []Severe   Rotation L [x]Yes  []No   [] No  []Mild   []Moderate  [x]Severe    Shoulder Flexion R []Yes  [x]No   [] No  [x]Mild   []Moderate  []Severe   Shoulder Flexion L [x]Yes  []No   [] No  []Mild   []Moderate  [x]Severe       Strength/Neuromuscular control:  - myotomal strength was strong, symmetrical and functional with the exception of C7, and T1. Dermatomal Sensation:  - Dermatomal sensation was intact to light touch bilaterally throughout upper and lower extremities with the exception of the ulnar side of the hand.   Deep tendon reflexes:  - DTR's were comorbidities that impact the plan of care;  []1-2 personal factors and/or comorbidities that impact the plan of care  []3 personal factors and/or comorbidities that impact the plan of care  [x] An examination of body systems using standardized tests and measures addressing any of the following: body structures and functions (impairments), activity limitations, and/or participation restrictions;:  [x] a total of 1-2 or more elements   [] a total of 3 or more elements   [] a total of 4 or more elements   [x] A clinical presentation with:  [x] stable and/or uncomplicated characteristics   [] evolving clinical presentation with changing characteristics  [] unstable and unpredictable characteristics;   [x] Clinical decision making of [x] low, [] moderate, [] high complexity using standardized patient assessment instrument and/or measurable assessment of functional outcome. [x] EVAL (LOW) 84710 (typically 30 minutes face-to-face)  [] EVAL (MOD) 49209 (typically 30 minutes face-to-face)  [] EVAL (HIGH) 03979 (typically 45 minutes face-to-face)  [] RE-EVAL    PLAN: Begin PT focusing on: cervical mobilization, deep neck flexor and proximal upper extremity motor control, patient education and home exercise program.    Frequency/Duration:  2-3 days per week for 2-4 Weeks:  Interventions:  [x]  Therapeutic exercise including: strength training, ROM, for cervical spine,scapula, core and Upper extremity, including postural re-education. [x]  NMR activation and proprioception for Deep cervical flexors, periscapular and RC muscles and Core, including postural re-education. [x]  Manual therapy as indicated for C/T spine, ribs, Soft tissue to include: Dry Needling/IASTM, STM, PROM, Gr I-IV mobilizations, manipulation.    [x] Modalities as needed that may include: thermal agents, E-stim, Biofeedback, US, iontophoresis as indicated  [x] Patient education on joint protection, postural re-education, activity modification,

## 2020-03-17 NOTE — FLOWSHEET NOTE
[address] [facility]    [facility] Daily Treatment Note  Date:  3/17/2020    Patient Name:  Rojas Fall    :  1955  MRN: [id number]  Restrictions/Precautions:    Medical/Treatment Diagnosis Information:  · Diagnosis: M54.12 (ICD-10-CM) - Cervical radiculopathy  · Treatment Diagnosis: M54.2 Neck Pain  Insurance/Certification information:  PT Insurance Information: not available at the time of evaluation  Physician Information:  Referring Practitioner: Dr. Guera De Dios of care signed (Y/N):     Date of Patient follow up with Physician:     Is this a Progress Report:     []  Yes  [x]  No        If Yes:  Date Range for reporting period:  Beginning  Ending    Progress report will be due (10 Rx or 30 days whichever is less):        Recertification will be due (POC Duration  / 90 days whichever is less): 2020         Visit # Insurance Allowable Auth Required   1 unknown []  Yes   ?? []  No        Functional Scale: 54% NDI    Date assessed:  3/16/2020      Latex Allergy:  [x]NO      []YES  Preferred Language for Healthcare:   [x]English       []other:    Pain level:  7-8/10     SUBJECTIVE:  See eval    OBJECTIVE: See eval   Observation:    Test measurements:      RESTRICTIONS/PRECAUTIONS: none    Exercises/Interventions:      Exercises/Interventions:  Manual traction 5 for 30 seconds   Manual PA's 4'   Manual sideglides Bilateral 5 reps   Manual upper trap stretch 5 for 30 seconds bilaterally   Prone thoracic PA    Manual cervical isometric ext, lat flexion, rotation        Chin tucks 20 reps   Self cervical isometric extension 10 reps   Self cervical isometric lateral flexion    Self cervical isometric rotation    Chin tuck head lift 5 reps holding 5 seconds       Shoulder external rotation PRE    Shoulder extension PRE    Shoulder internal rotation PRE    Shrugs    Scapular retraction PRE        Quadruped    Flexion/extension Verbal cueing    sidebending bilaterally Verbal cueing   Rotation bilaterally Verbal cueing   traction Verbal cueing     [] Provided education in neutral spine position as it relates to the functional positions of  [] sitting   [] driving  [x] Provided verbal cueing for self stretching of:   [] Upper trap   [] Levator  [x] Provided education in multimodal approach to treatment to include proper dosage of postural awareness, body mechanics awareness, hip flexibility, lumbopelvic stabilization, spinal ROM, manual therapy and modalities. Therapeutic Exercise and NMR EXR  [x] (72285) Provided verbal/tactile cueing for activities related to strengthening, flexibility, endurance, ROM  for improvements in proximal hip and core control with self care, mobility, lifting and ambulation.  [] (35623) Provided verbal/tactile cueing for activities related to improving balance, coordination, kinesthetic sense, posture, motor skill, proprioception  to assist with core control in self care, mobility, lifting, and ambulation.      Therapeutic Activities:    [] (60262 or 55938) Provided verbal/tactile cueing for activities related to improving balance, coordination, kinesthetic sense, posture, motor skill, proprioception and motor activation to allow for proper function  with self care and ADLs  [] (33842) Provided training and instruction to the patient for proper core and proximal hip recruitment and positioning with ambulation re-education     Home Exercise Program:    [] (56372) Reviewed/Progressed HEP activities related to strengthening, flexibility, endurance, ROM of core, proximal hip and LE for functional self-care, mobility, lifting and ambulation   [] (50305) Reviewed/Progressed HEP activities related to improving balance, coordination, kinesthetic sense, posture, motor skill, proprioception of core, proximal hip and LE for self care, mobility, lifting, and ambulation      Manual Treatments:  PROM / STM / Oscillations-Mobs:  G-I, II, III, IV (PA's, Inf., Post.)  [] (87371) Provided manual therapy to mobilize proximal hip and LS spine soft tissue/joints for the purpose of modulating pain, promoting relaxation,  increasing ROM, reducing/eliminating soft tissue swelling/inflammation/restriction, improving soft tissue extensibility and allowing for proper ROM for normal function with self care, mobility, lifting and ambulation. Modalities:   Cervical traction - 20# 30-10 duty cycle for 15'    Charges:  Timed Code Treatment Minutes: 28'   Total Treatment Minutes: 61'     [x] EVAL  [x] NO(51665) x     [] IONTO  [] NMR (91010) x     [] VASO  [x] Manual (51861) x      [] Other:  [] TA x      [x] Mech Traction (23372)  [] ES(attended) (19722)      [] ES (un) (87186):     ASSESSMENT:  See eval    GOALS:  Patient stated goal: reduce neck pain and resume left hand function  [] Progressing: [] Met: [] Not Met: [] Adjusted    Therapist goals for Patient:   Short Term Goals: To be achieved in: 2 weeks  - Independent in HEP and progression per patient tolerance, in order to prevent re-injury. [] Progressing: [] Met: [] Not Met: [] Adjusted  - Patient will have a decrease in pain to facilitate improvement in movement, function, and ADLs as indicated by Functional Deficits. [] Progressing: [] Met: [] Not Met: [] Adjusted    Long Term Goals: To be achieved in: 4 weeks  - Disability index score of 20% or less for the NDI to assist with reaching prior level of function. [] Progressing: [] Met: [] Not Met: [] Adjusted  - Patient will demonstrate increased passive and active ROM to full, symmetrical and painfree to allow for proper joint functioning as indicated by patients Functional Deficits. .   [] Progressing: [] Met: [] Not Met: [] Adjusted  - Patient will demonstrate an increase in Strength to full, symmetrical and painfree to allow for proper functional mobility as indicated by patients Functional Deficits.    - Patient will return to desired higher level, functional activities without increased symptoms or restriction. [] Progressing: [] Met: [] Not Met: [] Adjusted  -Patient will demonstrate an increase in postural awareness and control and activation of  Deep cervical stabilizers to allow for proper functional mobility as indicated by patients Functional Deficits. [] Progressing: [] Met: [] Not Met: [] Adjusted    Overall Progression Towards Functional goals/ Treatment Progress Update:  [] Patient is progressing as expected towards functional goals listed. [] Progression is slowed due to complexities/Impairments listed. [] Progression has been slowed due to co-morbidities. [x] Plan just implemented, too soon to assess goals progression <30days   [] Goals require adjustment due to lack of progress  [] Patient is not progressing as expected and requires additional follow up with physician  [] Other    Prognosis for POC: [x] Good [] Fair  [] Poor      Patient requires continued skilled intervention: [x] Yes  [] No    Treatment/Activity Tolerance:  [x] Patient able to complete treatment  [] Patient limited by fatigue  [] Patient limited by pain    [] Patient limited by other medical complications  [] Other:             PLAN: See eval  [] Continue per plan of care [] Alter current plan (see comments above)  [x] Plan of care initiated [] Hold pending MD visit [] Discharge  Plan moving forward:  Initiate and continue a program geared toward manual therapy, posture and body mechanics training, cervical flexibility, deep neck stabilization and rotator cuff/scapular conditioning with a graded progression toward more upright and functional positions      Electronically signed by: Berry Sena PT, DPT    Note: If patient does not return for scheduled/ recommended follow up visits, this note will serve as a discharge from care along with most recent update on progress.

## 2020-03-17 NOTE — PROGRESS NOTES
Chief Complaint  Elbow Pain (Left cubital tunnel )      HISTORY OF PRESENT ILLNESS:  Juana Gregory is a  right-hand-dominant patient here for a numbness & tingling in ulnar fingers of his Left hand, along with left-sided cervical pain, that began approximately 6 weeks ago. Since then symptoms have been persistent. Patient does have associated hand weakness. Symptoms are worsening over time. EMG testing: yes   Patient denies balance problems. Patient states numbness in the hand has worsened. He feels weakness has worsened.     The patient was referred for a specialty hand and upper extremity consultation by my local colleague Dr. Bell Durbin, for evaluation and treatment of left hand numbness and cubital tunnel syndrome    Medical History:  Past Medical History:   Diagnosis Date    Allergic rhinitis     environmental    Arthritis     Hypertension     Kidney stones     had to have lithotripsy    Mucoid cyst of joint     Nosebleeds     Palpitations 2010    frequent PVC's, couplets- seen Cardiologist x 2 in past ? name, improved in last couple years per pt      Past Surgical History:   Procedure Laterality Date    COLONOSCOPY  2009    Dr. Ariel Bojorquez, normal per pt     COLONOSCOPY N/A 6/7/2019    COLONOSCOPY WITH BIOPSY performed by Fly Bowers MD at [facility]      right inguinal    [address] REPLACEMENT  12/2007    hip replacement- Left     KNEE ARTHROSCOPY  12/2006    LITHOTRIPSY      d/t kidney stones- Dr. Jelani Brown N/A 3/10/2020    CERVICAL SEVEN THORACIC ONE INTERLAMINAR EPIDURAL STEROID INJECTION SITE CONFIRMED BY FLUOROSCOPY performed by Stacy Roy MD at [address] TOE SURGERY  1980's    TONSILLECTOMY  childhood     Family History   Problem Relation Age of Onset    High Blood Pressure Mother     Heart Disease Father      Social History     Socioeconomic History    Marital Small Finger   Negative tinel's, nerve compression, and Phalen's sign at level of the wrist.               Left elbow(s)   Full ROM. Positive Tinel's at the elbow. Positive elbow flexion test.  Absence of tenderness to palpation of medial epicondyle and common flexor origin. No apparent ulnar nerve instability          Radiology:         DIAGNOSTIC TESTING: EMG: Moderate left cubital tunnel syndrome. Moderate C8 radiculopathy    IMPRESSION AND PLAN:  Left cubital tunnel syndrome. Left cervical radiculopathy     I have reviewed the diagnosis and evidence based treatment options for the patient, both surgical and non-surgical.      Patient has evidence of double crush syndrome. We discussed treatment options. Patient would like the nerve released at the elbow. Outpatient procedure under general and local anesthesia. Clearance is requested. Surgical procedure along with time to recovery outlined. Transposition is indicated if ulnar nerve instability is present at surgery. This was discussed. The risks and benefits were discussed thoroughly. These included, but were not limited to infection, tendon or nerve injury, scar or elbow sensitivity, need for transfusion, adverse effects of anesthesia, incomplete relief of numbness, pain, and/or weakness. We appreciate the patient referral.  Patient understands that he may not have full resolution of symptoms following elbow surgery, he may still need treatment to address the more proximal nerve compression. All questions and concerns were addressed today. Patient is in agreement with the plan. Juan Pablo Archer MD  Hand & Upper Extremity Surgery  [facility]  A partner of [facility] ([facility])        Please note that this transcription was created using voice recognition software. Any errors are unintentional and may be due to voice recognition transcription.

## 2020-03-18 ENCOUNTER — TELEPHONE (OUTPATIENT)
Dept: ORTHOPEDIC SURGERY | Age: 65
End: 2020-03-18

## 2020-03-19 ENCOUNTER — HOSPITAL ENCOUNTER (OUTPATIENT)
Dept: PHYSICAL THERAPY | Age: 65
Setting detail: THERAPIES SERIES
Discharge: HOME | End: 2020-03-19
Payer: MEDICARE

## 2020-03-19 ENCOUNTER — APPOINTMENT (OUTPATIENT)
Dept: PHYSICAL THERAPY | Age: 65
End: 2020-03-19
Payer: MEDICARE

## 2020-03-19 RX ORDER — LISINOPRIL 40 MG/1
40 TABLET ORAL DAILY
Qty: 30 TABLET | Refills: 5 | Status: SHIPPED | OUTPATIENT
Start: 2020-03-19 | End: 2020-07-11 | Stop reason: SDUPTHER

## 2020-03-22 RX ORDER — GABAPENTIN 100 MG/1
100 CAPSULE ORAL 3 TIMES DAILY
Qty: 90 CAPSULE | Refills: 5 | Status: SHIPPED | OUTPATIENT
Start: 2020-03-22 | End: 2020-09-18

## 2020-03-23 ENCOUNTER — OFFICE VISIT (OUTPATIENT)
Dept: ORTHOPEDIC SURGERY | Age: 65
End: 2020-03-23
Payer: MEDICARE

## 2020-03-23 VITALS
HEIGHT: 68 IN | HEART RATE: 76 BPM | DIASTOLIC BLOOD PRESSURE: 74 MMHG | SYSTOLIC BLOOD PRESSURE: 128 MMHG | BODY MASS INDEX: 26.23 KG/M2 | WEIGHT: 173.06 LBS

## 2020-03-23 PROCEDURE — 4004F PT TOBACCO SCREEN RCVD TLK: CPT | Performed by: PHYSICAL MEDICINE & REHABILITATION

## 2020-03-23 PROCEDURE — 4040F PNEUMOC VAC/ADMIN/RCVD: CPT | Performed by: PHYSICAL MEDICINE & REHABILITATION

## 2020-03-23 PROCEDURE — 1123F ACP DISCUSS/DSCN MKR DOCD: CPT | Performed by: PHYSICAL MEDICINE & REHABILITATION

## 2020-03-23 PROCEDURE — G8417 CALC BMI ABV UP PARAM F/U: HCPCS | Performed by: PHYSICAL MEDICINE & REHABILITATION

## 2020-03-23 PROCEDURE — G8482 FLU IMMUNIZE ORDER/ADMIN: HCPCS | Performed by: PHYSICAL MEDICINE & REHABILITATION

## 2020-03-23 PROCEDURE — 3017F COLORECTAL CA SCREEN DOC REV: CPT | Performed by: PHYSICAL MEDICINE & REHABILITATION

## 2020-03-23 PROCEDURE — 99214 OFFICE O/P EST MOD 30 MIN: CPT | Performed by: PHYSICAL MEDICINE & REHABILITATION

## 2020-03-23 PROCEDURE — G8427 DOCREV CUR MEDS BY ELIG CLIN: HCPCS | Performed by: PHYSICAL MEDICINE & REHABILITATION

## 2020-03-23 RX ORDER — TRAMADOL HYDROCHLORIDE 50 MG/1
50 TABLET ORAL EVERY 8 HOURS PRN
Qty: 90 TABLET | Refills: 0 | Status: SHIPPED | OUTPATIENT
Start: 2020-03-23 | End: 2020-04-22

## 2020-03-23 NOTE — LETTER
MEDICATION AGREEMENT     [facility]  2/76/6376      For certain conditions, multiple classes of medications may be used to help better manage your symptoms, and to improve your ability to function at home, work and in social settings. However, these medications do have risks, which will be discussed with you, including addiction and dependency. The following prescribed medications need frequent monitoring and will require you to partner and assist in your healthcare. Medication  Dose, instructions and quantity as indicated on current prescription bottle Diagnosis/Reason(s) for Taking Category   TRAMADOL 50MG [id number]    CERVICAL RADICULOPATHY                            Benefits and goals of Controlled Substance Medications: There are two potential goals for your treatment: (1) decreased pain and suffering (2) improved daily life functions. There are many possible treatments for your chronic condition(s), and, in addition to controlled substance medications, we will try alternatives such as physical therapy, yoga, massage, home daily exercise, meditation, relaxation techniques, injections, chiropractic manipulations, surgery, cognitive therapy, hypnosis and many medications that are not habit-forming. Use of controlled substance medications may be helpful, but they are unlikely to resolve all of your symptoms or restore all function. Risks of Controlled Substance Medications:    Opioid pain medications: These medications can lead to problems such as addiction/dependence, sedation, lightheadedness/dizziness, memory issues, falls, constipation, nausea, or vomiting. They may also impair the ability to drive or operate machinery. Additionally, these medications may lower testosterone levels, leading to loss of bone strength, stamina and sex drive. They may cause problems with breathing, sleep apnea and reduced coughing, which are especially dangerous for patients with lung disease.   Overdose or withdrawal symptoms may include depressed mood, loss of interest, suicidal thoughts, anxiety, fatigue, appetite changes and agitation. Testosterone replacement therapy:  Potential side effects include increased risk of stroke and heart attack, blood clots, increased blood pressure, increased cholesterol, enlarged prostate, sleep apnea, irritability/aggression and other mood disorders, and decreased fertility. Other:     1. I understand that I have the following responsibilities:  · I will take medications at the dose and frequency prescribed. · I will not increase or change how I take my medications without the approval of the health care provider who signs this Medication Agreement. · I will arrange for refills at the prescribed interval ONLY during regular office hours. I will not ask for refills earlier than agreed, after-hours, on holidays or on weekends. · I will obtain all refills for these medications at  ·  [facility]  pharmacy (phone number  ·  [phone]), with full consent for my provider and pharmacist to exchange information in writing or verbally. · I will not request any pain medications or controlled substances from other providers and will inform this provider of all other medications I am taking. · I will inform my other health care providers that I am taking these medications and of the existence of this [address]. In the event of an emergency, I will provide the same information to the emergency department providers. · I will protect my prescriptions and medications. I understand that lost or misplaced prescriptions will not be replaced. · I will keep medications only for my own use and will not share them with others. I will keep all medications away from children. · I agree to participate in any medical, psychological or psychiatric assessments recommended by my provider.   · I will actively participate in any program designed to improve function, including social, medication trial may be part of my treatment, but I must be an active participant in my care. Medication therapy is only one part of my symptom management plan. In some cases, there may be limited scientific evidence to support the chronic use of certain medications to improve symptoms and daily function. Furthermore, in certain circumstances, there may be scientific information that suggests that use of chronic controlled substances may actually worsen my symptoms and increase my risk of unintentional death directly related to this medication therapy. I know that if my provider feels my risk from controlled medications is greater than my benefit, I will have my controlled substance medication(s) compassionately lowered or removed altogether. I agree to a controlled substance medication trial.      I further agree to allow this office to contact my HIPAA contact on file if there are concerns about my safety and use of controlled medications. I have agreed to use the following medications above as instructed by my physician and as stated in this [address].      Patient Signature:  ______________________  Date:3/23/2020     Provider Signature:______________________  DOQV:1/67/3779

## 2020-03-23 NOTE — PROGRESS NOTES
Follow up: Roro Yeh  1955  [id number]         Chief Complaint   Patient presents with    Medication Check     REQUEST FOR TRAMADOL AND GABAPENTIN         HISTORY OF PRESENT ILLNESS:  Mr. Matilde Wing is a 71 y.o. male returns for a follow up visit for multiple medical problems. His current presenting problems are   1. Cervical radiculopathy    2. Cervical spondylosis without myelopathy    3. Foraminal stenosis of cervical region    4. DDD (degenerative disc disease), cervical    5. Left hand weakness    . As per information/history obtained from the PADT(patient assessment and documentation tool) - He complains of pain in the neck with radiation to the arms Left and hands Left He rates the pain 4/10 and describes it as sharp, dull, aching, numbness. Pain is made worse by: N/A. He denies side effects from the current pain regimen. Patient reports that since the last follow up visit the physical functioning is worsening, family/social relationships are unchanged, mood is unchanged and sleep patterns are unchanged, and that the overall functioning is worsening. Patient denies neurological bowel or bladder. He notes about 25% relief following a GUNJAN. Mr. Matilde Wing returns today regarding his neck pain. He notes that Tramadol and Gabapentin have been helping his symptoms. However, he has 2 tablets of Tramadol left; his last dose was administered on 3/22/2020. The patient notes he is out of Gabapentin, however his refill was sent and received by the pharmacy on 3/22/2020. The patient states that alleviating factors include raising his arm above his head, Tramadol, Gabapentin and Ibuprofen. Adverse Events to opioids:  Patient currently on a bowel regiment? No   A. Nausea? No   B. Vomiting? No   C. Constipation? No   D. Itching? No   E. Drowsiness? No  Are these new symptoms since last visit? No    Potential Aberrant Drug-Related Behavior:   - Aberrant behavior identified?  No   - Potential MG tablet, Take 400 mg by mouth every 6 hours as needed for Pain., Disp: , Rfl:     Omega-3 Fatty Acids (FISH OIL) 1000 MG CAPS, Take 1,000 mg by mouth daily. , Disp: , Rfl:     aspirin 81 MG tablet, Take 81 mg by mouth daily. , Disp: , Rfl:   Allergies:  Patient has no known allergies. Social History:    reports that he has been smoking. He has a 7.50 pack-year smoking history. He has never used smokeless tobacco. He reports current alcohol use. He reports that he does not use drugs. Family History:   Family History   Problem Relation Age of Onset    High Blood Pressure Mother     Heart Disease Father        REVIEW OF SYSTEMS:   CONSTITUTIONAL: Denies unexplained weight loss, fevers, chills or fatigue  NEUROLOGICAL: Denies unsteady gait or progressive weakness  MUSCULOSKELETAL: Denies joint swelling or redness  GI: Denies nausea, vomiting, diarrhea   : Denies bowel or bladder issues       PHYSICAL EXAM:    Vitals: Blood pressure 128/74, pulse 76, height 5' 7.99\" (1.727 m), weight 173 lb 1 oz (78.5 kg). GENERAL EXAM:  · General Apparence: Patient is adequately groomed with no evidence of malnutrition. · Psychiatric: Orientation: The patient is oriented to time, place and person. The patient's mood and affect are appropriate   · Vascular: Examination reveals no swelling and palpation reveals no tenderness in upper or lower extremities. Good capillary refill. · The lymphatic examination of the neck, axillae and groin reveals all areas to be without enlargement or induration  · Sensation is intact without deficit in the upper and lower extremities to light touch and pinprick  · Coordination of the upper and lower extremities are normal.    CERVICAL EXAMINATION:  · Inspection: Local inspection shows no step-off or bruising. Cervical alignment is normal. No instability is noted. · Palpation and Percussion: No evidence of tenderness at the midline. Paraspinal tenderness is present.  There is no paraspinal indications for therapeutic injections. The indications for additional imaging/laboratory studies. The indications for (possible future) interventions. After considering the various options discussed, Juana Gregory elected to pursue a course of treatment that includes the followin. Medications:  . OARRS reviewed and appropriate. Low risk on ORT. 4 A's reviewed. Continue current regimen of Tramadol 50 mg po tid. Opioid agreement was signed. Informed verbal consent was obtained. Goals of the current treatment regimen include: improvement in pain, improvement in overall in physical performance, ability to perform daily activities, work or disability, emotional distress, health care utilization and decreased medication consumption. Progress will be monitored towards achieving/maintaining therapeutic goals:    1. Improving perceived interference of pain with ADL's, ability to perform HEP, continue to improve in overall flexibility, ROM, strength and endurance, ability to do household activities indoor and/or outdoor, work and social/leisure activities. Improve psychosocial and physical functioning. 2. Improving sleep to 6-7 hours a night. Improve mood/ anxiety and depression symptoms    3. Reduction of reliance on opioid analgesia/more appropriate opioid use. Utilization of adjuvant medications as appropriate. Risks and benefits of the medications and alternative treatments have been discussed with the patient. The patient was advised against drinking alcohol with the opioid pain medicines, advised against driving or handling machinery when starting or adjusting the dose of medicines, feeling groggy or drowsy, or if having any cognitive issues related to the current medications. The patient is fully aware of the risk of overdose and death, if medicines are misused and not taken as prescribed.  If the patient develops new symptoms or if the symptoms worsen, the patient was told to call the

## 2020-03-25 ENCOUNTER — TELEPHONE (OUTPATIENT)
Dept: ORTHOPEDIC SURGERY | Age: 65
End: 2020-03-25

## 2020-03-26 ENCOUNTER — TELEPHONE (OUTPATIENT)
Dept: ORTHOPEDIC SURGERY | Age: 65
End: 2020-03-26

## 2020-04-01 ENCOUNTER — OFFICE VISIT (OUTPATIENT)
Dept: ORTHOPEDIC SURGERY | Age: 65
End: 2020-04-01
Payer: MEDICARE

## 2020-04-01 ENCOUNTER — OFFICE VISIT (OUTPATIENT)
Dept: FAMILY MEDICINE CLINIC | Age: 65
End: 2020-04-01
Payer: MEDICARE

## 2020-04-01 ENCOUNTER — TELEPHONE (OUTPATIENT)
Dept: ORTHOPEDIC SURGERY | Age: 65
End: 2020-04-01

## 2020-04-01 ENCOUNTER — ANESTHESIA EVENT (OUTPATIENT)
Dept: OPERATING ROOM | Age: 65
End: 2020-04-01
Payer: MEDICARE

## 2020-04-01 VITALS
SYSTOLIC BLOOD PRESSURE: 136 MMHG | BODY MASS INDEX: 26.92 KG/M2 | DIASTOLIC BLOOD PRESSURE: 84 MMHG | OXYGEN SATURATION: 98 % | HEIGHT: 68 IN | HEART RATE: 63 BPM | WEIGHT: 177.6 LBS

## 2020-04-01 VITALS — BODY MASS INDEX: 26.23 KG/M2 | HEIGHT: 68 IN | WEIGHT: 173.06 LBS

## 2020-04-01 PROCEDURE — 4040F PNEUMOC VAC/ADMIN/RCVD: CPT | Performed by: ORTHOPAEDIC SURGERY

## 2020-04-01 PROCEDURE — 4004F PT TOBACCO SCREEN RCVD TLK: CPT | Performed by: PHYSICIAN ASSISTANT

## 2020-04-01 PROCEDURE — 99214 OFFICE O/P EST MOD 30 MIN: CPT | Performed by: PHYSICIAN ASSISTANT

## 2020-04-01 PROCEDURE — 3017F COLORECTAL CA SCREEN DOC REV: CPT | Performed by: PHYSICIAN ASSISTANT

## 2020-04-01 PROCEDURE — G8417 CALC BMI ABV UP PARAM F/U: HCPCS | Performed by: ORTHOPAEDIC SURGERY

## 2020-04-01 PROCEDURE — G8417 CALC BMI ABV UP PARAM F/U: HCPCS | Performed by: PHYSICIAN ASSISTANT

## 2020-04-01 PROCEDURE — 4004F PT TOBACCO SCREEN RCVD TLK: CPT | Performed by: ORTHOPAEDIC SURGERY

## 2020-04-01 PROCEDURE — 1123F ACP DISCUSS/DSCN MKR DOCD: CPT | Performed by: PHYSICIAN ASSISTANT

## 2020-04-01 PROCEDURE — 3017F COLORECTAL CA SCREEN DOC REV: CPT | Performed by: ORTHOPAEDIC SURGERY

## 2020-04-01 PROCEDURE — 99213 OFFICE O/P EST LOW 20 MIN: CPT | Performed by: PHYSICIAN ASSISTANT

## 2020-04-01 PROCEDURE — 4040F PNEUMOC VAC/ADMIN/RCVD: CPT | Performed by: PHYSICIAN ASSISTANT

## 2020-04-01 PROCEDURE — 99213 OFFICE O/P EST LOW 20 MIN: CPT | Performed by: ORTHOPAEDIC SURGERY

## 2020-04-01 PROCEDURE — G8427 DOCREV CUR MEDS BY ELIG CLIN: HCPCS | Performed by: PHYSICIAN ASSISTANT

## 2020-04-01 PROCEDURE — G8428 CUR MEDS NOT DOCUMENT: HCPCS | Performed by: ORTHOPAEDIC SURGERY

## 2020-04-01 PROCEDURE — 1123F ACP DISCUSS/DSCN MKR DOCD: CPT | Performed by: ORTHOPAEDIC SURGERY

## 2020-04-01 PROCEDURE — 93000 ELECTROCARDIOGRAM COMPLETE: CPT | Performed by: PHYSICIAN ASSISTANT

## 2020-04-01 RX ORDER — ARGININE HCL 1000 MG
1 TABLET ORAL 3 TIMES DAILY
COMMUNITY

## 2020-04-01 NOTE — PROGRESS NOTES
crush syndrome    We had a lengthy discussion regarding the ulnar neuropathy and peripheral nerve compression along with his ongoing radiculopathy. Patient feels that his neurologic dysfunction is worsening. His pain has increased. Weakness in the left hand is increased along with ulnar distributed numbness. He is quite concerned and would like to have the surgical release of the left ulnar nerve at this point. Patient understands the current coronavirus pandemic and restrictions on surgical interventions. Patient has also pulled surgical indications as outlined by Shi Sunshine, which does include the threat of permanent dysfunction of an extremity or organ system. Therefore we discussed proceeding with left arm ulnar nerve decompression at the elbow with the possibility of subcutaneous transposition if there is ulnar nerve instability at the time of surgery. Indication for this would be to prevent a permanent dysfunction of the left arm as his compression is quite significant on nerve testing and he has had worsening symptoms over the past several weeks despite conservative measures. Surgical procedure along with time to recovery outlined. This would be an outpatient procedure, done at the [facility] hospital.  This will be under general and local anesthesia. Surgical procedure along with time to recovery outlined. Preoperative clearance is requested. He would like to proceed once approved. The risks and benefits were discussed thoroughly. These included, but were not limited to infection, tendon or nerve injury, scar or elbow sensitivity, need for transfusion, adverse effects of anesthesia, incomplete relief of numbness, pain, and/or weakness. All questions and concerns were addressed today. Patient is in agreement with the plan.         Andres Riley MD  Hand & Upper Extremity Surgery  [address]  A partner of [facility] ([facility])        Please note that

## 2020-04-01 NOTE — PROGRESS NOTES
Onset    High Blood Pressure Mother     Heart Disease Father        REVIEW OF SYSTEMS:    CONSTITUTIONAL:  negative  EYES:  negative  HEENT:  negative  RESPIRATORY:  negative  CARDIOVASCULAR:  negative  GASTROINTESTINAL:  negative  GENITOURINARY:  negative  INTEGUMENT/BREAST:  negative  HEMATOLOGIC/LYMPHATIC:  negative  ALLERGIC/IMMUNOLOGIC:  negative  ENDOCRINE:  negative  MUSCULOSKELETAL:  negative  NEUROLOGICAL:  negative    PHYSICAL EXAM:      /84 (Site: Right Upper Arm, Position: Sitting)   Pulse 63   Ht 5' 7.99\" (1.727 m)   Wt 177 lb 9.6 oz (80.6 kg)   SpO2 98%   BMI 27.01 kg/m² Body mass index is 27.01 kg/m². Eyes:  Lids and lashes normal, pupils equal, round and reactive to light, extra ocular muscles intact, sclera clear, conjunctiva normal  Head/ENT:  Normocephalic, without obvious abnormality, atraumatic, sinuses nontender on palpation, external ears without lesions, oral pharynx with moist mucus membranes, tonsils without erythema or exudates, gums normal and good dentition. Neck:  Supple, symmetrical, trachea midline, no adenopathy, thyroid symmetric, not enlarged and no tenderness, skin normal  Heart:  Normal apical impulse, regular rate and rhythm, normal S1 and S2, no S3 or S4, and no murmur noted  Lungs:  No increased work of breathing, good air exchange, clear to auscultation bilaterally, no crackles or wheezing  Abdomen:  normal bowel sounds, soft, non-distended, non-tender, no masses palpated, no hepatosplenomegally  Extremities:  No clubbing, cyanosis, or edema      DATA:  EKG:  See attached    ASSESSMENT AND PLAN:    1. Patient is a 71 y.o. male with above specified procedure planned on 4/2/20 with Dr. Gricel Barker at [facility]. 2. Stop asa and nsaid medications now. Genaro Chan is Medically stable for surgery. Report will be faxed.

## 2020-04-01 NOTE — ANESTHESIA PRE PROCEDURE
mouth daily   vitamin D (CHOLECALCIFEROL) 1000 UNIT   Take 1,000 Units by mouth daily   Omega-3 Fatty Acids (FISH OIL) 1000 MG CAPS Take 1,000 mg by mouth daily. aspirin 81 MG tablet Take 81 mg by mouth daily.      Allergies:  No Known Allergies    Problem List:     Inguinal hernia    Supraventricular tachycardia ([address]    Essential hypertension, benign    Left hip pain    Dyslipidemia (high LDL; low HDL)    Lateral epicondylitis (tennis elbow)    Kidney stones    Acute colitis    Acute left eye pain    Upper back pain on left side    Sciatica of left side    Low back pain    Vertigo    Thyroid nodule    Cervical neck pain with evidence of disc disease    Adhesive capsulitis of left shoulder    Cervical spinal stenosis    Mucoid cyst of joint    History of total hip replacement, left    Vasculogenic erectile dysfunction    Rectal polyp    Cubital tunnel syndrome on left       Past Medical History:     Allergic rhinitis     environmental    Arthritis     Hypertension     Kidney stones     had to have lithotripsy    Mucoid cyst of joint     Nosebleeds     Palpitations 2010    frequent PVC's, couplets- seen Cardiologist x 2 in past ? name, improved in last couple years per pt     Post-nasal drip     \"concerned about choking\"     Past Surgical History:     COLONOSCOPY  2009    Dr. Mickie Silvestre, normal per pt     COLONOSCOPY N/A 6/7/2019    COLONOSCOPY WITH BIOPSY performed by Cherry White MD at [facility]      right inguinal     HIP SURGERY      JOINT REPLACEMENT  12/2007    hip replacement- Left     KNEE ARTHROSCOPY  12/2006    LITHOTRIPSY      d/t kidney stones- Dr. Kit Pedraza N/A 3/10/2020    CERVICAL SEVEN THORACIC ONE INTERLAMINAR EPIDURAL STEROID INJECTION SITE CONFIRMED BY FLUOROSCOPY performed by Rico Nicole MD at [address] TOE SURGERY  1980's    TONSILLECTOMY  childhood     Social History:    Smoking status: Former Smoker     Packs/day: 0.50     Years: 15.00     Pack years: 7.50     Last attempt to quit: 2009     Years since quittin.2    Smokeless tobacco: Never Used   Substance Use Topics    Alcohol use: Yes     Comment: SOCIAL      Vital Signs (Current):    BP: 153/87 Pulse: 63   Resp: 18 SpO2: 99   Temp: 97.3 °F (36.3 °C)   Height: 5' 8\" (1.727 m)  (20) Weight: 177 lb 9 oz (80.5 kg)  (20)   BMI: 27.1           BP Readings from Last 3 Encounters:   20 136/84   20 128/74   20 131/87     NPO Status: >8 hrs                        BMI:   Wt Readings from Last 3 Encounters:   20 173 lb (78.5 kg)   20 177 lb 9.6 oz (80.6 kg)   20 173 lb 1 oz (78.5 kg)     Body mass index is 26.3 kg/m². CBC:    HGB 15.4 2020    HCT 46.1 2020     2020     CMP:     2020    CO2 28 2020    BUN 21 2020    CREATININE 1.0 2020    GLUCOSE 100 2020     Anesthesia Evaluation  Patient summary reviewed and Nursing notes reviewed  Airway: Mallampati: II  TM distance: >3 FB   Neck ROM: full  Mouth opening: > = 3 FB Dental:          Pulmonary:       (-) COPD, asthma, recent URI, sleep apnea and not a current smoker                           Cardiovascular:  Exercise tolerance: good (>4 METS),   (+) hypertension:, dysrhythmias: SVT, hyperlipidemia    (-) CABG/stent,  angina and  AGUSTIN    ECG reviewed                     ROS comment: EK2020 Sinus  Bradycardia 58; WITHIN NORMAL LIMITS     Neuro/Psych:      (-) seizures, TIA and CVA            ROS comment: +Cervicalgia  GI/Hepatic/Renal:   (+) renal disease: kidney stones,      (-) GERD       Endo/Other:    (+) : arthritis: OA., .    (-) diabetes mellitus, hypothyroidism               Abdominal:           Vascular:     - DVT and PE. Anesthesia Plan      general     ASA 3       Induction: intravenous.     MIPS: Prophylactic

## 2020-04-01 NOTE — PROGRESS NOTES
History of present illness:   Mr. Miranda Delong is a pleasant 71 y.o. old male with a PMH of HTN kindly referred by Dr. Ale Smith for consultation regarding Mr. Joaquin Mccall neck, and left arm pain. He states the pain began insidiously about 1.5 months ago. His pain has steadily persisted since then. He rates his neck pain 7-8/10 and shoulder and arm pain 4/10. He describes the pain as aching. The pain is localized to the left side of his neck and his left trapezius. He also notes numbness and tingling in his left ring finger and small finger. He notes occasional pain in his left elbow. He denies, lower extremity symptoms, gait abnormality and bowel or bladder dysfunction. The pain moderately interferes with his sleep. He has tried physical therapy, oral steroids, and 1 epidural injection with temporary relief. He takes Ultram and Neurontin. He saw Dr. Berkeley Bosworth earlier today and is in the process of scheduling left ulnar nerve decompression. Past medical history:   His past medical history has been reviewed and is significant for HTN. His past surgical history has been reviewed and is non-contributory to his present illness. His  medications and allergies were reviewed. His social history has been reviewed and he smokes 1/2 ppd, 7.5 pack year history. His family history has been reviewed is significant for HTN. Review of symptoms:   His review of symptoms was reviewed and is significant for neck pain and negative for recent weight loss, fatigue, chills, night sweats, blood in stool or urine, recent infection, chest pain, visual disturbance, or shortness of breath. All other ROS were negative. Physical examination:   Mr. Joaquin Mccall most recent vitals:  Vitals  Height: 5' 7.99\" (172.7 cm)  Weight: 173 lb 1 oz (78.5 kg)  Body mass index is 26.32 kg/m².     General Exam:  He is well-developed and well-nourished, is in obvious pain and alert and oriented to person, place,

## 2020-04-02 ENCOUNTER — ANESTHESIA (OUTPATIENT)
Dept: OPERATING ROOM | Age: 65
End: 2020-04-02
Payer: MEDICARE

## 2020-04-02 ENCOUNTER — HOSPITAL ENCOUNTER (OUTPATIENT)
Age: 65
Setting detail: OUTPATIENT SURGERY
Discharge: HOME | End: 2020-04-02
Attending: ORTHOPAEDIC SURGERY | Admitting: ORTHOPAEDIC SURGERY
Payer: MEDICARE

## 2020-04-02 VITALS
SYSTOLIC BLOOD PRESSURE: 165 MMHG | WEIGHT: 177.56 LBS | DIASTOLIC BLOOD PRESSURE: 89 MMHG | TEMPERATURE: 97.2 F | HEIGHT: 68 IN | RESPIRATION RATE: 16 BRPM | BODY MASS INDEX: 26.91 KG/M2 | OXYGEN SATURATION: 96 % | HEART RATE: 65 BPM

## 2020-04-02 VITALS — DIASTOLIC BLOOD PRESSURE: 67 MMHG | SYSTOLIC BLOOD PRESSURE: 116 MMHG | OXYGEN SATURATION: 95 %

## 2020-04-02 PROCEDURE — 2709999900 HC NON-CHARGEABLE SUPPLY: Performed by: ORTHOPAEDIC SURGERY

## 2020-04-02 PROCEDURE — 2580000003 HC RX 258: Performed by: ANESTHESIOLOGY

## 2020-04-02 PROCEDURE — 7100000011 HC PHASE II RECOVERY - ADDTL 15 MIN: Performed by: ORTHOPAEDIC SURGERY

## 2020-04-02 PROCEDURE — 3700000001 HC ADD 15 MINUTES (ANESTHESIA): Performed by: ORTHOPAEDIC SURGERY

## 2020-04-02 PROCEDURE — 2500000003 HC RX 250 WO HCPCS: Performed by: NURSE ANESTHETIST, CERTIFIED REGISTERED

## 2020-04-02 PROCEDURE — 7100000000 HC PACU RECOVERY - FIRST 15 MIN: Performed by: ORTHOPAEDIC SURGERY

## 2020-04-02 PROCEDURE — 7100000001 HC PACU RECOVERY - ADDTL 15 MIN: Performed by: ORTHOPAEDIC SURGERY

## 2020-04-02 PROCEDURE — 7100000010 HC PHASE II RECOVERY - FIRST 15 MIN: Performed by: ORTHOPAEDIC SURGERY

## 2020-04-02 PROCEDURE — 6360000002 HC RX W HCPCS: Performed by: NURSE ANESTHETIST, CERTIFIED REGISTERED

## 2020-04-02 PROCEDURE — 3700000000 HC ANESTHESIA ATTENDED CARE: Performed by: ORTHOPAEDIC SURGERY

## 2020-04-02 PROCEDURE — 2580000003 HC RX 258: Performed by: ORTHOPAEDIC SURGERY

## 2020-04-02 PROCEDURE — 3600000002 HC SURGERY LEVEL 2 BASE: Performed by: ORTHOPAEDIC SURGERY

## 2020-04-02 PROCEDURE — 2500000003 HC RX 250 WO HCPCS: Performed by: ORTHOPAEDIC SURGERY

## 2020-04-02 PROCEDURE — 6360000002 HC RX W HCPCS: Performed by: ORTHOPAEDIC SURGERY

## 2020-04-02 PROCEDURE — 3600000012 HC SURGERY LEVEL 2 ADDTL 15MIN: Performed by: ORTHOPAEDIC SURGERY

## 2020-04-02 RX ORDER — HYDRALAZINE HYDROCHLORIDE 20 MG/ML
5 INJECTION INTRAMUSCULAR; INTRAVENOUS EVERY 10 MIN PRN
Status: DISCONTINUED | OUTPATIENT
Start: 2020-04-02 | End: 2020-04-02 | Stop reason: HOSPADM

## 2020-04-02 RX ORDER — DEXAMETHASONE SODIUM PHOSPHATE 4 MG/ML
INJECTION, SOLUTION INTRA-ARTICULAR; INTRALESIONAL; INTRAMUSCULAR; INTRAVENOUS; SOFT TISSUE PRN
Status: DISCONTINUED | OUTPATIENT
Start: 2020-04-02 | End: 2020-04-02 | Stop reason: SDUPTHER

## 2020-04-02 RX ORDER — GLYCOPYRROLATE 0.2 MG/ML
INJECTION INTRAMUSCULAR; INTRAVENOUS PRN
Status: DISCONTINUED | OUTPATIENT
Start: 2020-04-02 | End: 2020-04-02 | Stop reason: SDUPTHER

## 2020-04-02 RX ORDER — ONDANSETRON 2 MG/ML
4 INJECTION INTRAMUSCULAR; INTRAVENOUS
Status: DISCONTINUED | OUTPATIENT
Start: 2020-04-02 | End: 2020-04-02 | Stop reason: HOSPADM

## 2020-04-02 RX ORDER — PROPOFOL 10 MG/ML
INJECTION, EMULSION INTRAVENOUS PRN
Status: DISCONTINUED | OUTPATIENT
Start: 2020-04-02 | End: 2020-04-02 | Stop reason: SDUPTHER

## 2020-04-02 RX ORDER — OXYCODONE HYDROCHLORIDE AND ACETAMINOPHEN 5; 325 MG/1; MG/1
1 TABLET ORAL PRN
Status: DISCONTINUED | OUTPATIENT
Start: 2020-04-02 | End: 2020-04-02 | Stop reason: HOSPADM

## 2020-04-02 RX ORDER — BUPIVACAINE HYDROCHLORIDE 5 MG/ML
INJECTION, SOLUTION EPIDURAL; INTRACAUDAL PRN
Status: DISCONTINUED | OUTPATIENT
Start: 2020-04-02 | End: 2020-04-02 | Stop reason: ALTCHOICE

## 2020-04-02 RX ORDER — EPHEDRINE SULFATE 50 MG/ML
INJECTION INTRAVENOUS PRN
Status: DISCONTINUED | OUTPATIENT
Start: 2020-04-02 | End: 2020-04-02 | Stop reason: SDUPTHER

## 2020-04-02 RX ORDER — OXYCODONE HYDROCHLORIDE AND ACETAMINOPHEN 5; 325 MG/1; MG/1
2 TABLET ORAL PRN
Status: DISCONTINUED | OUTPATIENT
Start: 2020-04-02 | End: 2020-04-02 | Stop reason: HOSPADM

## 2020-04-02 RX ORDER — MAGNESIUM HYDROXIDE 1200 MG/15ML
LIQUID ORAL CONTINUOUS PRN
Status: COMPLETED | OUTPATIENT
Start: 2020-04-02 | End: 2020-04-02

## 2020-04-02 RX ORDER — ACETAMINOPHEN 500 MG
1000 TABLET ORAL EVERY 6 HOURS PRN
COMMUNITY

## 2020-04-02 RX ORDER — ONDANSETRON 2 MG/ML
INJECTION INTRAMUSCULAR; INTRAVENOUS PRN
Status: DISCONTINUED | OUTPATIENT
Start: 2020-04-02 | End: 2020-04-02 | Stop reason: SDUPTHER

## 2020-04-02 RX ORDER — SODIUM CHLORIDE, SODIUM LACTATE, POTASSIUM CHLORIDE, CALCIUM CHLORIDE 600; 310; 30; 20 MG/100ML; MG/100ML; MG/100ML; MG/100ML
INJECTION, SOLUTION INTRAVENOUS CONTINUOUS
Status: DISCONTINUED | OUTPATIENT
Start: 2020-04-02 | End: 2020-04-02 | Stop reason: HOSPADM

## 2020-04-02 RX ORDER — HYDROCODONE BITARTRATE AND ACETAMINOPHEN 5; 325 MG/1; MG/1
1 TABLET ORAL EVERY 6 HOURS PRN
Qty: 20 TABLET | Refills: 0 | Status: SHIPPED | OUTPATIENT
Start: 2020-04-02 | End: 2020-04-07

## 2020-04-02 RX ORDER — LIDOCAINE HYDROCHLORIDE 20 MG/ML
INJECTION, SOLUTION INFILTRATION; PERINEURAL PRN
Status: DISCONTINUED | OUTPATIENT
Start: 2020-04-02 | End: 2020-04-02 | Stop reason: SDUPTHER

## 2020-04-02 RX ORDER — PROMETHAZINE HYDROCHLORIDE 25 MG/ML
6.25 INJECTION, SOLUTION INTRAMUSCULAR; INTRAVENOUS
Status: DISCONTINUED | OUTPATIENT
Start: 2020-04-02 | End: 2020-04-02 | Stop reason: HOSPADM

## 2020-04-02 RX ORDER — FENTANYL CITRATE 50 UG/ML
25 INJECTION, SOLUTION INTRAMUSCULAR; INTRAVENOUS EVERY 5 MIN PRN
Status: DISCONTINUED | OUTPATIENT
Start: 2020-04-02 | End: 2020-04-02 | Stop reason: HOSPADM

## 2020-04-02 RX ORDER — FENTANYL CITRATE 50 UG/ML
INJECTION, SOLUTION INTRAMUSCULAR; INTRAVENOUS PRN
Status: DISCONTINUED | OUTPATIENT
Start: 2020-04-02 | End: 2020-04-02 | Stop reason: SDUPTHER

## 2020-04-02 RX ADMIN — SODIUM CHLORIDE, POTASSIUM CHLORIDE, SODIUM LACTATE AND CALCIUM CHLORIDE: 600; 310; 30; 20 INJECTION, SOLUTION INTRAVENOUS at 09:35

## 2020-04-02 RX ADMIN — CEFAZOLIN SODIUM 2 G: 10 INJECTION, POWDER, FOR SOLUTION INTRAVENOUS at 10:44

## 2020-04-02 RX ADMIN — GLYCOPYRROLATE 0.2 MG: 0.2 INJECTION, SOLUTION INTRAMUSCULAR; INTRAVENOUS at 11:05

## 2020-04-02 RX ADMIN — FENTANYL CITRATE 100 MCG: 50 INJECTION INTRAMUSCULAR; INTRAVENOUS at 10:47

## 2020-04-02 RX ADMIN — PROPOFOL 200 MG: 10 INJECTION, EMULSION INTRAVENOUS at 10:47

## 2020-04-02 RX ADMIN — EPHEDRINE SULFATE 10 MG: 50 INJECTION INTRAVENOUS at 11:20

## 2020-04-02 RX ADMIN — LIDOCAINE HYDROCHLORIDE 60 MG: 20 INJECTION, SOLUTION INFILTRATION; PERINEURAL at 10:47

## 2020-04-02 RX ADMIN — DEXAMETHASONE SODIUM PHOSPHATE 10 MG: 4 INJECTION, SOLUTION INTRAMUSCULAR; INTRAVENOUS at 11:03

## 2020-04-02 RX ADMIN — ONDANSETRON 4 MG: 2 INJECTION INTRAMUSCULAR; INTRAVENOUS at 11:03

## 2020-04-02 ASSESSMENT — PULMONARY FUNCTION TESTS
PIF_VALUE: 3
PIF_VALUE: 0
PIF_VALUE: 3
PIF_VALUE: 2
PIF_VALUE: 3

## 2020-04-02 ASSESSMENT — PAIN SCALES - GENERAL
PAINLEVEL_OUTOF10: 0
PAINLEVEL_OUTOF10: 4
PAINLEVEL_OUTOF10: 0

## 2020-04-02 ASSESSMENT — PAIN DESCRIPTION - PAIN TYPE: TYPE: ACUTE PAIN;CHRONIC PAIN

## 2020-04-02 ASSESSMENT — PAIN DESCRIPTION - DESCRIPTORS
DESCRIPTORS: ACHING
DESCRIPTORS: ACHING;THROBBING;SHARP

## 2020-04-02 ASSESSMENT — LIFESTYLE VARIABLES: SMOKING_STATUS: 0

## 2020-04-02 ASSESSMENT — PAIN DESCRIPTION - LOCATION: LOCATION: ELBOW;NECK

## 2020-04-02 ASSESSMENT — PAIN DESCRIPTION - ORIENTATION: ORIENTATION: LEFT

## 2020-04-02 ASSESSMENT — PAIN - FUNCTIONAL ASSESSMENT: PAIN_FUNCTIONAL_ASSESSMENT: 0-10

## 2020-04-02 NOTE — OP NOTE
Patient:  Esther Francois  Date of Surgery:  4/2/2020    Pre-Op Diagnoses:  1.  left cubital tunnel syndrome  2.          3.            Post-op Diagnoses:   1.  same  2. Procedure(s) Performed:  1.  left in situ cubital tunnel release  2.         3.             Anesthesia Type:  General and local    Blood Loss:   Minimal    Pathology:   None    Complications:   None    Assistant:  [facility]      Informed consent was on the chart. Surgical site marked by Dr Karlo Plascencia in preop holding. All risks and benefits were discussed once again day of surgery, patient desired to proceed. The patient was brought to the operating and room and placed in the supine position. After the induction of anesthesia a standard time-out was performed. Description of the Procedure:    left upper extremity was prepped and draped in the normal sterile fashion and antibiotics were in place. Formal timeout was held. The upper extremity was exsanguinated and the tourniquet inflated to 250 mmHg. A standard 5-6 cm curvilinear incision was made just behind the medial epicondyle, through skin only. Meticulous hemostasis. Transversing sensory branches to the medial elbow and medial forearm were identified and protected. Romero's ligament and the cubital tunnel were identified and isolated. Romero's ligament was released completely under direct visualization while protecting the underlying nerve. It was quite tight at Romero's ligament. There was an hourglass shape to the nerve but the nerve appeared to have normal coloring. Nerve was completely released proximally. Release was taken distally to the FCU fascia. The deep and superficial FCU fascial layers were released under direct visualization carefully protecting the small nerve branches. The nerve was nicely decompressed at this time. A small vessel running along the nerve was well filled.     The elbow was placed through a range of motion, there was no

## 2020-04-02 NOTE — ANESTHESIA POSTPROCEDURE EVALUATION
Department of Anesthesiology  Postprocedure Note    Patient: Jo Ann Bell  MRN: [id number]  YOB: 1955  Date of evaluation: 4/2/2020    Procedure Summary     Date:  04/02/20 Room / Location:  [facility]    Anesthesia Start:  1040 Anesthesia Stop:  3410    Procedure:  LEFT CUBITAL TUNNEL RELEASE WITH TRANSPOSITION (Left ) Diagnosis:       Neuropathy, ulnar at elbow, left      (NEUROPATHY OF LEFT ULNAR NERVE AT WRIST)    Surgeon:  Prieto Mckenna MD Responsible Provider:  Letty Parkinson MD    Anesthesia Type:  general ASA Status:  3        Anesthesia Type: general    Virginia Phase I: Virginia Score: 10    Virginia Phase II: Virginia Score: 10    Last vitals: Reviewed and per EMR flowsheets.      Anesthesia Post Evaluation   Anesthetic Problems: no   Cardiovascular System Stable: yes  Respiratory Function: Airway Patent yes  ETT no  Ventilator no  Level of consciousness: awake, alert and oriented  Post-op pain: adequate analgesia  Hydration Adequate: yes  Nausea/Vomiting:no  Other Issues:     Angelo Zamarripa MD

## 2020-04-08 ENCOUNTER — VIRTUAL VISIT (OUTPATIENT)
Dept: ORTHOPEDIC SURGERY | Age: 65
End: 2020-04-08
Payer: MEDICARE

## 2020-04-08 VITALS — WEIGHT: 177.47 LBS | HEIGHT: 68 IN | BODY MASS INDEX: 26.9 KG/M2

## 2020-04-08 PROCEDURE — 99213 OFFICE O/P EST LOW 20 MIN: CPT | Performed by: PHYSICAL MEDICINE & REHABILITATION

## 2020-04-08 PROCEDURE — G2012 BRIEF CHECK IN BY MD/QHP: HCPCS | Performed by: PHYSICAL MEDICINE & REHABILITATION

## 2020-04-08 NOTE — PROGRESS NOTES
for errors. It is possible that there are still dictated errors within this office note. If so, please bring any errors to my attention for an addendum. All efforts were made to ensure that this office note is accurate. Pursuant to the emergency declaration under the [facility], [facility] waiver authority and the [employer] and [employer] Act, this Virtual  Visit was conducted, with patient's consent, to reduce the patient's risk of exposure to COVID-19 and provide continuity of care for an established patient. Services were provided through a video synchronous discussion virtually to substitute for in-person clinic visit. We have confirmed that, for purposes of billing, this is a virtual visit with for which we will submit a claim for reimbursement with your insurance company. The patient has accepted responsibility for any copays, coinsurance amounts or other amounts not covered by their insurance company. This visit was completed virtually using doxy. me.

## 2020-04-14 ENCOUNTER — OFFICE VISIT (OUTPATIENT)
Dept: ORTHOPEDIC SURGERY | Age: 65
End: 2020-04-14

## 2020-04-14 VITALS — WEIGHT: 177.47 LBS | HEIGHT: 68 IN | BODY MASS INDEX: 26.9 KG/M2

## 2020-04-14 PROCEDURE — 99024 POSTOP FOLLOW-UP VISIT: CPT | Performed by: ORTHOPAEDIC SURGERY

## 2020-04-14 NOTE — PROGRESS NOTES
Chief Complaint   Patient presents with    Post-Op Check     s/p 4/2 left cubital tunnel release       HISTORY OF PRESENT ILLNESS:  Miranda Delong is a 71 y.o.  patient here for postoperative evaluation after left cubital tunnel release, in situ, 12 days ago. First postoperative visit today. Numbness of the ulnar digits is still present. No other symptoms. ROS:  ROS neg     Past medical history is reviewed again today, no changes to report    PHYSICAL EXAMINATION:  Patient is alert and pleasant, in no acute distress. The affected extremity is examined today. Wound is healing nicely. No sign of infection or dehiscence. Compartments are soft. Minimal ecchymosis. Excellent motion left hand. No clawing or apparent atrophy. Decreased light touch and station ring and small finger. Elbow motion is excellent, no ulnar nerve instability noted    IMPRESSION AND PLAN: Left cubital tunnel syndrome  Doing well after left cubital tunnel release, in situ. We will continue with our current care plan. Postoperative wound care was discussed with the patient today. Sutures were removed without difficulty. Steri-Strips were applied (as long as no allergy) to help prevent wound dehiscence. Appropriate monitoring and care of the wound, including cleansing, was outlined with the patient today. We discussed appropriate activity limitations and modifications over the next couple weeks to prevent wound complication, such as dehiscence. The patient understands to contact my office if having wound problems. These would include, but not limited to, erythema, new swelling or pain, dehiscence, signs of infection. Compression sleeves. Appropriate activity modifications. Postoperative therapy for a cubital tunnel program outlined. He has upcoming evaluation with a neurosurgeon this week. Follow-up with me in 6 weeks unless needed sooner. All questions and concerns were addressed today.   Patient is in agreement with the plan. Chloe Hastings MD  Hand & Upper Extremity Surgery  [facility]  A partner of [facility] ([facility])        Please note that this transcription was created using voice recognition software. Any errors are unintentional and may be due to voice recognition transcription.

## 2020-04-16 ENCOUNTER — TELEPHONE (OUTPATIENT)
Dept: ORTHOPEDIC SURGERY | Age: 65
End: 2020-04-16

## 2020-04-16 ENCOUNTER — HOSPITAL ENCOUNTER (OUTPATIENT)
Dept: OCCUPATIONAL THERAPY | Age: 65
Setting detail: THERAPIES SERIES
Discharge: HOME | End: 2020-04-16
Payer: MEDICARE

## 2020-04-16 PROCEDURE — 97110 THERAPEUTIC EXERCISES: CPT | Performed by: OCCUPATIONAL THERAPIST

## 2020-04-16 PROCEDURE — 97112 NEUROMUSCULAR REEDUCATION: CPT | Performed by: OCCUPATIONAL THERAPIST

## 2020-04-16 PROCEDURE — 97165 OT EVAL LOW COMPLEX 30 MIN: CPT | Performed by: OCCUPATIONAL THERAPIST

## 2020-04-16 NOTE — FLOWSHEET NOTE
[facility] [place]  [address], [address] [facility], [address] [place] [address]  Phone: [phone] Fax: [phone]    Occupational Therapy Treatment Note/ Progress Report:     Date:  2020    Patient Name:  Angélica Flores    :  1955  MRN: [id number]    Medical/Treatment Diagnosis Information:  · Diagnosis: G56.22 (ICD-10-CM) - Ulnar neuropathy of left upper extremity   · Treatment Diagnosis: L elbow pain K30.535     Insurance/Certification information:  OT Insurance Information: Medicare  Physician Information:  Referring Practitioner: Jose Dhillon MD  Has the plan of care been signed (Y/N):        []  Yes  [x]  No       Visit # Insurance Allowable Auth Required   1 BMN []  Yes [x]  No        Is this a Progress Report:     []  Yes  [x]  No      If Yes:  Date Range for reporting period:  Beginning  Ending    Progress report will be due (10 Rx or 30 days whichever is less):      Recertification will be due (POC Duration  / 90 days whichever is less): 20     Date of Injury: NA  Date of Surgery: 20 s/p cubital tunnel release operative site possible transposition     Date of Patient follow up with Physician: 20     RESTRICTIONS/PRECAUTIONS: surgical precautions     Latex Allergy:  [x]? No      []? Yes                    Pacemaker:  [x]? No       []? Yes      Preferred Language for Healthcare:   [x]? English       []? other:      Functional Scale: 50% (Quick DASH)                                 Date assessed:  2020     SUBJECTIVE: Patient reported deficits/history of current problem: February of this year noted increased pain at the neck and then within 3 days had numbness/tingling into RF/SF; MRI and EMG revealed neck and elbow dysfunction; has had therapy for neck several weeks ago with minimal relief; seen by Dr Katalina Fernandez to address symptoms at the elbow     Pain Scale: 6/10             OBJECTIVE:       Date:  2020

## 2020-04-16 NOTE — PLAN OF CARE
toileting/hygiene   [] recreational activities   [] driving    [] community/social participation   [] other:     Rehab Potential:   [] Excellent [x] Good [] Fair  [] Poor     Barriers affecting rehab potential:  [x]Age    []Lack of Motivation   [x]Co-Morbidities  []Cognitive Function  []Environmental/home/work barriers  []Other: Tolerance of evaluation/treatment:    [] Excellent [x] Good [] Fair  [] Poor    PLAN OF CARE:  Interventions:   [x] Therapeutic Exercise [x] Therapeutic Activity    [x] Activities of Daily Living [x] Neuromuscular Re-education      [x] Patient Education  [x] Manual Therapy      [x] Modalities as needed, and not otherwise contraindicated, including: ultrasound,paraffin,moist heat/cold pack, electrical stimulation, contrast bath, iontophoresis  [x] Splinting as needed    Frequency/Duration:  1 days per week for 4-6 weeks      GOALS:  Patient stated goal: recover use of my L hand    [] Progressing: [] Met: [] Not Met: [] Adjusted    Therapist goals for Patient:   Short Term Goals: To be achieved in: 2 weeks  1. Independent in HEP and progression per patient tolerance, in order to prevent re-injury. [] Progressing: [] Met: [] Not Met: [] Adjusted   2. Patient will have a decrease in pain to facilitate improvement in movement, function, and ADLs as indicated by Functional Deficits. [] Progressing: [] Met: [] Not Met: [] Adjusted    Long Term Goals to be achieved in 4-6 weeks (through 5/28/20), including patient directed goals to address patient identified performance deficits:  1) Pt to be independent in graded HEP progression with a good level of effort and compliance. [] Progressing: [] Met: [] Not Met: [] Adjusted   2) Pt to report a score of </= 30 % on the Quick DASH disability questionnaire for increased performance with carrying, moving, and handling objects.   [] Progressing: [] Met: [] Not Met: [] Adjusted   3) Pt will demonstrate increased ROM to L SF adduction to full for

## 2020-04-17 ENCOUNTER — TELEPHONE (OUTPATIENT)
Dept: FAMILY MEDICINE CLINIC | Age: 65
End: 2020-04-17

## 2020-04-17 NOTE — TELEPHONE ENCOUNTER
If you get his fax today please call me at  and I will put the lab orders in and he will have it done at [facility] on Monday.

## 2020-04-20 ENCOUNTER — TELEPHONE (OUTPATIENT)
Dept: FAMILY MEDICINE CLINIC | Age: 65
End: 2020-04-20

## 2020-04-21 DIAGNOSIS — Z01.818 PREOP EXAMINATION: ICD-10-CM

## 2020-04-21 LAB
ALBUMIN SERPL-MCNC: 4.6 G/DL (ref 3.4–5)
ANION GAP SERPL CALCULATED.3IONS-SCNC: 14 MMOL/L (ref 3–16)
BUN BLDV-MCNC: 22 MG/DL (ref 7–20)
CALCIUM SERPL-MCNC: 10.7 MG/DL (ref 8.3–10.6)
CHLORIDE BLD-SCNC: 103 MMOL/L (ref 99–110)
CO2: 27 MMOL/L (ref 21–32)
CREAT SERPL-MCNC: 1.1 MG/DL (ref 0.8–1.3)
GFR AFRICAN AMERICAN: >60
GFR NON-AFRICAN AMERICAN: >60
GLUCOSE BLD-MCNC: 129 MG/DL (ref 70–99)
HCT VFR BLD CALC: 47.5 % (ref 40.5–52.5)
HEMOGLOBIN: 15.8 G/DL (ref 13.5–17.5)
MCH RBC QN AUTO: 30.9 PG (ref 26–34)
MCHC RBC AUTO-ENTMCNC: 33.2 G/DL (ref 31–36)
MCV RBC AUTO: 93 FL (ref 80–100)
PDW BLD-RTO: 13.5 % (ref 12.4–15.4)
PHOSPHORUS: 3.1 MG/DL (ref 2.5–4.9)
PLATELET # BLD: 308 K/UL (ref 135–450)
PMV BLD AUTO: 7.8 FL (ref 5–10.5)
POTASSIUM SERPL-SCNC: 4.7 MMOL/L (ref 3.5–5.1)
RBC # BLD: 5.11 M/UL (ref 4.2–5.9)
SODIUM BLD-SCNC: 144 MMOL/L (ref 136–145)
VITAMIN D 25-HYDROXY: 17.5 NG/ML
WBC # BLD: 7.7 K/UL (ref 4–11)

## 2020-04-23 ENCOUNTER — APPOINTMENT (OUTPATIENT)
Dept: OCCUPATIONAL THERAPY | Age: 65
End: 2020-04-23
Payer: MEDICARE

## 2020-05-26 ENCOUNTER — OFFICE VISIT (OUTPATIENT)
Dept: ORTHOPEDIC SURGERY | Age: 65
End: 2020-05-26

## 2020-05-26 VITALS — HEIGHT: 68 IN | WEIGHT: 177.47 LBS | BODY MASS INDEX: 26.9 KG/M2

## 2020-05-26 PROCEDURE — 99024 POSTOP FOLLOW-UP VISIT: CPT | Performed by: ORTHOPAEDIC SURGERY

## 2020-05-26 NOTE — PROGRESS NOTES
Chief Complaint   Patient presents with    Follow-up     s/p 4/2/2020 left cubital tunnel release       HISTORY OF PRESENT ILLNESS:  Rich Unger is a 71 y.o.  patient here for repeat postoperative evaluation after in situ left cubital tunnel release approximately 2 months ago. Since last evaluation with me, he is undergone cervical decompression and fusion by 1 of the local neurosurgeons. Patient overall feels that he has made improvement with the left arm, he denies any pain in the left elbow. Tingling in the left hand is slightly improved but not gone. His biggest frustration at this stage is still some weakness in the left hand but he is doing his daily exercises. ROS:  ROS neg     Past medical history is reviewed again today, no changes to report    PHYSICAL EXAMINATION:  Patient is alert and pleasant, in no acute distress. The affected extremity is examined today. Left hand reveals no clawing. Left elbow reveals a well-healed surgical site, full motion without ulnar nerve instability.  weakness left hand, when compared to the right. Fingers have full motion left hand      IMPRESSION AND PLAN:   Left cubital tunnel syndrome  Doing well, and encouragement provided. We will continue with our current care plan. Progressive rehabilitation of the left elbow hand and wrist, he may benefit from a strength ball. He has his home exercises for cubital tunnel rehabilitation. He may return to outpatient hand therapy, when it safely opens with current coronavirus pandemic. I will see him in approximately 2 and half to 3 months, prior to his relocation to [facility]. All questions and concerns were addressed today. Patient is in agreement with the plan. Leora Guajardo MD  Hand & Upper Extremity Surgery  [address]  A partner of [facility] ([facility])        Please note that this transcription was created using voice recognition software.   Any errors are

## 2020-06-09 ENCOUNTER — HOSPITAL ENCOUNTER (OUTPATIENT)
Dept: OCCUPATIONAL THERAPY | Age: 65
Setting detail: THERAPIES SERIES
Discharge: HOME | End: 2020-06-09
Payer: MEDICARE

## 2020-06-09 PROCEDURE — 97110 THERAPEUTIC EXERCISES: CPT | Performed by: OCCUPATIONAL THERAPIST

## 2020-06-09 PROCEDURE — 97168 OT RE-EVAL EST PLAN CARE: CPT | Performed by: OCCUPATIONAL THERAPIST

## 2020-06-09 PROCEDURE — 97112 NEUROMUSCULAR REEDUCATION: CPT | Performed by: OCCUPATIONAL THERAPIST

## 2020-06-09 NOTE — FLOWSHEET NOTE
No       Visit # Insurance Allowable Auth Required   2 BMN []  Yes [x]  No        Is this a Progress Report:     []  Yes  [x]  No      If Yes:  Date Range for reporting period:  Beginning  Ending    Progress report will be due (10 Rx or 30 days whichever is less): 6/23/75     Recertification will be due (POC Duration  / 90 days whichever is less): 5/28/20     Date of Injury: NA  Date of Surgery: 4/2/20 s/p cubital tunnel release operative site possible transposition     Date of Patient follow up with Physician: 5/26/20     RESTRICTIONS/PRECAUTIONS: surgical precautions - per 5/26/20 MD note - continue with our current care plan. Progressive rehabilitation of the left elbow hand and wrist, he may benefit from a strength ball. He has his home exercises for cubital tunnel rehabilitation. He may return to outpatient hand therapy, when it safely opens with current coronavirus pandemic. I will see him in approximately 2 and half to 3 months, prior to his relocation to [place].     Latex Allergy:  [x]? No      []? Yes                    Pacemaker:  [x]? No       []? Yes      Preferred Language for Healthcare:   [x]? English       []? other:      Functional Scale: 55% (Quick DASH)                                 Date assessed:  6/9/2020     SUBJECTIVE:  Dr Adilson Rogers performed neck surgery (C7/T1 cervical fusion) on 4/23/20; seen by Dr Collin Rojas 5/26/20 and referred back to therapy for progression of HEP, strengthening; no pain at this time, just numb, weak; reports also attending speech/voice therapy due to residual difficulties with vocal quality since neck surgery    Patient reported deficits/history of current problem: February of this year noted increased pain at the neck and then within 3 days had numbness/tingling into RF/SF; MRI and EMG revealed neck and elbow dysfunction; has had therapy for neck several weeks ago with minimal relief; seen by Dr Collin Rojas to address symptoms at the elbow     Pain Scale: 0/10     OBJECTIVE:       Date:  4/16/2020 6/9/20    Objective Measures/Tests:  S/p 9 weeks, 5 days    ROM:Digits: tips to DPFC   See eval 0cm IF_SF  (small lag in IF IP ext and full flexion initially)    Able to fully abd/add SF, however lag in abd/add IF (1cm from IF tip to side of LF)    Thumb tip to DPFC    2.5cm (passively 0cm to DPFC)    Wrist ext/flex            rd/ud    75/75    FA sup/pron    Elbow ext/flex    Shoulder    85/80    5HE/145    WNL    Strength:  II  Lateral Pinch  3 Point Pinch  Tip Pinch    R 102       L 16  24       0.5  16          1  15          0          Observations:        Other:                  MODALITIES:      Fluidotherapy (07749)      Estim (15554/65010)      Paraffin (26195)      US (36082)      Iontophoresis (90371)      Hot Pack      Cold Pack            INTERVENTIONS:      Therapeutic Exercise (89532)      AROM 10x2 digital abd/adduction    10x digital ext/flex, thumb ext/flex, wrist AROM, FA rotation ,elbow AROM - see sheets    Shoulder circles x 10 10x2 digital abd/adduction    10x digital ext/flex, thumb ext/flex, wrist AROM, FA rotation ,elbow AROM - see sheets     10x differential tendon gliding exercises (tabletop, flat fist, hook, fist)        Strengthening  Issued yellow (supersoft resistance) and blue (med resistance) sponges for graded resistance - 5x each gripping, LP, tip pinching, lumbrical , digital adduction    Rubberband spread (light band around IF-SF) x 10    PROM  Composite stretching x 5 wrist extensors, flexors          Therapeutic Activity (72236)                              Manual Therapy (53410)                  Neuromuscular Reeducation (26825) Cueing for exercise technique   Cueing for exercise technique, avoidance of substitution patterns    Issued fine motor coordination sheet for home activities to progress dexterity, fine motor skills    Desensitization Instructed on home desense sheet and progression    Hand desense in plastic pellet 7x weekly   Finger Adduction with Putty - 5-10 reps - 1-2x daily - 7x weekly   Finger Abduction with Rubber Band - 5-10 reps - 1-2x daily - 7x weekly   Seated Finger Abduction with Resistance - 5-10 reps - 1-2x daily - 7x weekly                   Splinting      Lcode:      Orthotic Mgmt, Subsequent Enc (09842)      Orthotic Mgmt & Training (69903)            Other:                                Therapeutic Exercise & NMR:  [x] (02687) Provided verbal/tactile cueing for activities related to strengthening, flexibility, endurance, ROM  for improvements in scapular, scapulothoracic and UE control with self care, reaching, carrying, lifting, house/yardwork, driving/computer work. [x] (62399) Provided verbal/tactile cueing for activities related to improving balance, coordination, kinesthetic sense, posture, motor skill, proprioception  to assist with  scapular, scapulothoracic and UE control with self care, reaching, carrying, lifting, house/yardwork, driving/computer work.     Therapeutic Activities & NMR:    [] (43127 or 28932) Provided verbal/tactile cueing for activities related to improving balance, coordination, kinesthetic sense, posture, motor skill, proprioception and motor activation to allow for proper function of scapular, scapulothoracic and UE control with self care, carrying, lifting, driving/computer work    Home Exercise Program:    [x] (85723) Reviewed/Progressed HEP activities related to strengthening, flexibility, endurance, ROM of scapular, scapulothoracic and UE control with self care, reaching, carrying, lifting, house/yardwork, driving/computer work  [x] (16544) Reviewed/Progressed HEP activities related to improving balance, coordination, kinesthetic sense, posture, motor skill, proprioception of scapular, scapulothoracic and UE control with self care, reaching, carrying, lifting, house/yardwork, driving/computer work      Manual Treatments:  PROM / STM / Oscillations-Mobs:  G-I, II, III, IV

## 2020-06-13 ENCOUNTER — PATIENT MESSAGE (OUTPATIENT)
Dept: FAMILY MEDICINE CLINIC | Age: 65
End: 2020-06-13

## 2020-06-15 RX ORDER — SILDENAFIL 100 MG/1
50 TABLET, FILM COATED ORAL PRN
Qty: 6 TABLET | Refills: 5 | Status: SHIPPED | OUTPATIENT
Start: 2020-06-15 | End: 2022-10-04 | Stop reason: ALTCHOICE

## 2020-06-15 NOTE — TELEPHONE ENCOUNTER
From: Shai Foster  To: Edgardo Henriquez MD  Sent: 6/13/2020 10:40 AM EDT  Subject: Prescription Question    Hello, I logged into My Chart to refill an Rx for Sildenafil 100 MG (Rx [id number] first prescribed 11/25/19) but it says the prescription can't be refilled via My Chart, and to contact the physicians office directly for a refill.  Thank you, [facility] [phone]

## 2020-06-17 ENCOUNTER — PATIENT MESSAGE (OUTPATIENT)
Dept: FAMILY MEDICINE CLINIC | Age: 65
End: 2020-06-17

## 2020-06-17 RX ORDER — SILDENAFIL CITRATE 20 MG/1
20 TABLET ORAL DAILY PRN
Qty: 30 TABLET | Refills: 5 | Status: SHIPPED | OUTPATIENT
Start: 2020-06-17 | End: 2022-10-04 | Stop reason: ALTCHOICE

## 2020-06-17 NOTE — TELEPHONE ENCOUNTER
From: Genaro Chan  To: Latonya Pierre MD  Sent: 6/17/2020 11:26 AM EDT  Subject: Prescription Question    Dr. Ray Must - thank you for re-filling my Rx for Sildenafil however [facility] says that 100 mg x 6 tabs will cost $160 and not covered by insurance. I found this dosage too strong (even when cut in half). If we can CHANGE the Rx to 20mg x 30 tablets to be taken over 84+ days, I will be able to get it for $10.67 with a discount card and will be able to take smaller doses occasionally, as needed. INSTEAD of [facility], please send this Rx to:    Britta Orozco [facility] [id number]   Thank you, Rachel Clark [phone] if any questions or concerns

## 2020-06-19 ENCOUNTER — TELEPHONE (OUTPATIENT)
Dept: ADMINISTRATIVE | Age: 65
End: 2020-06-19

## 2020-06-30 ENCOUNTER — TELEPHONE (OUTPATIENT)
Dept: FAMILY MEDICINE CLINIC | Age: 65
End: 2020-06-30

## 2020-07-09 ENCOUNTER — OFFICE VISIT (OUTPATIENT)
Dept: PRIMARY CARE CLINIC | Age: 65
End: 2020-07-09
Payer: MEDICARE

## 2020-07-09 PROCEDURE — G8417 CALC BMI ABV UP PARAM F/U: HCPCS | Performed by: PHYSICIAN ASSISTANT

## 2020-07-09 PROCEDURE — G8428 CUR MEDS NOT DOCUMENT: HCPCS | Performed by: PHYSICIAN ASSISTANT

## 2020-07-09 PROCEDURE — 99211 OFF/OP EST MAY X REQ PHY/QHP: CPT | Performed by: PHYSICIAN ASSISTANT

## 2020-07-09 NOTE — PROGRESS NOTES
Felipe Gibbs received a viral test for COVID-19. They were educated on isolation and quarantine as appropriate. For any symptoms, they were directed to seek care from their PCP, given contact information to establish with a doctor, directed to an urgent care or the emergency room.

## 2020-07-13 ENCOUNTER — TELEPHONE (OUTPATIENT)
Dept: FAMILY MEDICINE CLINIC | Age: 65
End: 2020-07-13

## 2020-07-13 LAB
SARS-COV-2: NOT DETECTED
SOURCE: NORMAL

## 2020-07-13 RX ORDER — LISINOPRIL 40 MG/1
TABLET ORAL
Qty: 90 TABLET | Refills: 1 | Status: SHIPPED | OUTPATIENT
Start: 2020-07-13 | End: 2020-07-28 | Stop reason: SDUPTHER

## 2020-07-13 RX ORDER — LISINOPRIL 40 MG/1
40 TABLET ORAL DAILY
Qty: 30 TABLET | Refills: 5 | Status: SHIPPED | OUTPATIENT
Start: 2020-07-13 | End: 2020-11-02 | Stop reason: SDUPTHER

## 2020-07-13 NOTE — TELEPHONE ENCOUNTER
Patient is awaiting his results of the COVID test he had done last week. He was asking if we can expedite the results bc he is leaving to visit his father in a nursing home. I advised we could not speed up the test, but the results should be back today. 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 When results are received, and if negative, could you send a message in My Chart indicating he received a negative result?

## 2020-07-20 ENCOUNTER — HOSPITAL ENCOUNTER (OUTPATIENT)
Dept: OCCUPATIONAL THERAPY | Age: 65
Setting detail: THERAPIES SERIES
Discharge: HOME | End: 2020-07-20
Payer: MEDICARE

## 2020-07-20 PROCEDURE — 97110 THERAPEUTIC EXERCISES: CPT | Performed by: OCCUPATIONAL THERAPIST

## 2020-07-20 PROCEDURE — 97112 NEUROMUSCULAR REEDUCATION: CPT | Performed by: OCCUPATIONAL THERAPIST

## 2020-07-20 NOTE — FLOWSHEET NOTE
[facility]  [address], [address] [facility], [address] [facility]  Phone: [phone] Fax: [phone]            Occupational Therapy Treatment Note/ Progress Report:     Date:  2020    Patient Name:  Coleen Cannon    :  1955  MRN: [id number]    Medical/Treatment Diagnosis Information:  · Diagnosis: G56.22 (ICD-10-CM) - Ulnar neuropathy of left upper extremity   · Treatment Diagnosis: L elbow pain O93.276     Insurance/Certification information:  OT Insurance Information: Medicare  Physician Information:  Referring Practitioner: Montana Cannon MD  Has the plan of care been signed (Y/N):        []  Yes  [x]  No       Visit # Insurance Allowable Auth Required   3 BMN []  Yes [x]  No        Is this a Progress Report:     [x]  Yes  []  No      If Yes:  Date Range for reporting period:  Beginning 20  Ending 20    Progress report will be due (10 Rx or 30 days whichever is less): 76    Recertification will be due (POC Duration  / 90 days whichever is less): 20    Date of Injury: NA  Date of Surgery: 20 s/p cubital tunnel release operative site possible transposition     Date of Patient follow up with Physician: 20     RESTRICTIONS/PRECAUTIONS: surgical precautions - per 20 MD note - continue with our current care plan. Progressive rehabilitation of the left elbow hand and wrist, he may benefit from a strength ball. He has his home exercises for cubital tunnel rehabilitation. He may return to outpatient hand therapy, when it safely opens with current coronavirus pandemic. I will see him in approximately 2 and half to 3 months, prior to his relocation to [place].     Latex Allergy:  [x]? No      []? Yes                    Pacemaker:  [x]? No       []? Yes      Preferred Language for Healthcare:   [x]? English       []? other:      Functional Scale: 45% (Quick DASH)                                 Date assessed:  7/20/2020     SUBJECTIVE:  to see Dr Jay Quezada later today; frustrated by perceived slow progress of hand strength    Patient reported deficits/history of current problem: February of this year noted increased pain at the neck and then within 3 days had numbness/tingling into RF/SF; MRI and EMG revealed neck and elbow dysfunction; has had therapy for neck several weeks ago with minimal relief; seen by Dr Rip Nino to address symptoms at the elbow     Pain Scale: 0/10     OBJECTIVE:       Date: 6/9/20 7/20/20   Objective Measures/Tests: S/p 9 weeks, 5 days    ROM:Digits: tips to DPFC   0cm IF_SF  (small lag in IF IP ext and full flexion initially)    Able to fully abd/add SF, however lag in abd/add IF (1cm from IF tip to side of LF) Full flexion (0cm IF-SF),  Full extension of all digits MP & IPs - tendency for slight flexion of IF IPs, however able to actively fully extend with cueing    Able to fully abd/add SF     Slight difficulty abducting IF (1cm from IF tip to side of LF)     Thumb tip to DPFC   2.5cm (passively 0cm to DPFC) 2.5cm (passively 0cm to [facility])   Wrist ext/flex            rd/ud   75/75 75/75   FA sup/pron    Elbow ext/flex    Shoulder   85/80    5HE/145    WNL 90/80    12HE/145    WNL   Strength:  II  Lateral Pinch  3 Point Pinch  Tip Pinch   R 102       L 16  24       0.5  16          1  15          0 R 105       L 23  26       2  19      1  14    0.5        Observations:       Other:               MODALITIES:     Fluidotherapy (18823)     Estim (90049/76859)     Paraffin (41741)     US (33238)     Iontophoresis (60145)     Hot Pack     Cold Pack          INTERVENTIONS:     Therapeutic Exercise (82476)     AROM 10x2 digital abd/adduction    10x digital ext/flex, thumb ext/flex, wrist AROM, 1FA rotation ,elbow AROM - see sheets     10x differential tendon gliding exercises (tabletop, flat fist, hook, fist)     10x digital abduction/adduction    10x intrinsic + for IP extension    10x digital ext/flex, thumb ext/flex, 5x wrist AROM, FA rotation , 3x elbow AROM       Strengthening Issued yellow (supersoft resistance) and blue (med resistance) sponges for graded resistance - 5x each gripping, LP, tip pinching, lumbrical , digital adduction    Rubberband spread (light band around IF-SF) x 10 Reviewed sponge exercises   PROM Composite stretching x 5 wrist extensors, flexors Reinforced PROM of digits        Therapeutic Activity (43658)                         Manual Therapy (96133)               Neuromuscular Reeducation (72848) Cueing for exercise technique, avoidance of substitution patterns    Issued fine motor coordination sheet for home activities to progress dexterity, fine motor skills Cueing for exercise technique, avoidance of substitution patterns    Issued new fine motor coordination sheet per pt request for home activities to progress dexterity, fine motor skills   Desensitization     Nerve glides     UN x 3    ADL Training (81208)               HEP Training/Review Access Code: [email or web address]   URL: [email or web address]   Date: 06/09/2020   Prepared by:  Tamica Bentley     Exercises   Standing Ulnar Nerve Glide - 3-5 reps - 3-4x daily - 7x weekly   Finger Extension/Flexion - Tendon Gliding - 10 reps - 3-4x daily - 7x weekly   Finger Abduction/Adduction - 10 reps - 3-4x daily - 7x weekly   Composite Thumb Flexion/Extension - 3-5 reps - 3-4x daily - 7x weekly   Wrist Extension/Flexion - 3-5 reps - 3-4x daily - 7x weekly   Forearm Rotation (Supination/Pronation) - 3-5 reps - 3-4x daily - 7x weekly   Seated Elbow Flexion and Extension AROM - 3-5 reps - 3-4x daily - 7x weekly   Composite Wrist Extensor Stretch - Shoulder Down - 3-5 reps - 3-5 sec hold - 3-4x daily - 7x weekly   Composite Wrist Flexor Stretch - Shoulder Down - 3-5 reps - 3-5 hold - 3-4x daily - 7x weekly   Sponge squeezes - 5-15 reps - 1-2x daily - 7x weekly   Pinch strengthening - 5-10 reps - 1-2x daily - 7x weekly Flat  - 5-10 reps - 1-2x daily - 7x weekly   Lateral Pinch Strengthening - 5-10 reps - 1-2x daily - 7x weekly   Finger Adduction with Putty - 5-10 reps - 1-2x daily - 7x weekly   Finger Abduction with Rubber Band - 5-10 reps - 1-2x daily - 7x weekly   Seated Finger Abduction with Resistance - 5-10 reps - 1-2x daily - 7x weekly    Reviewed HEP, reinforced frequency and ways to grade, progress exercises to promote maximal recovery of ROM, strength             Splinting     Lcode:     Orthotic Mgmt, Subsequent Enc (96913)     Orthotic Mgmt & Training (61603)          Other:                           Therapeutic Exercise & NMR:  [x] (31046) Provided verbal/tactile cueing for activities related to strengthening, flexibility, endurance, ROM  for improvements in scapular, scapulothoracic and UE control with self care, reaching, carrying, lifting, house/yardwork, driving/computer work. [x] (34568) Provided verbal/tactile cueing for activities related to improving balance, coordination, kinesthetic sense, posture, motor skill, proprioception  to assist with  scapular, scapulothoracic and UE control with self care, reaching, carrying, lifting, house/yardwork, driving/computer work.     Therapeutic Activities & NMR:    [] (02647 or 05540) Provided verbal/tactile cueing for activities related to improving balance, coordination, kinesthetic sense, posture, motor skill, proprioception and motor activation to allow for proper function of scapular, scapulothoracic and UE control with self care, carrying, lifting, driving/computer work    Home Exercise Program:    [x] (59354) Reviewed/Progressed HEP activities related to strengthening, flexibility, endurance, ROM of scapular, scapulothoracic and UE control with self care, reaching, carrying, lifting, house/yardwork, driving/computer work  [x] (97066) Reviewed/Progressed HEP activities related to improving balance, coordination, kinesthetic sense, posture, motor skill, proprioception of scapular, scapulothoracic and UE control with self care, reaching, carrying, lifting, house/yardwork, driving/computer work      Manual Treatments:  PROM / STM / Oscillations-Mobs:  G-I, II, III, IV (PA's, Inf., Post.)  [] (32035) Provided manual therapy to mobilize soft tissue/joints of cervical/CT, scapular GHJ and UE for the purpose of modulating pain, promoting relaxation,  increasing ROM, reducing/eliminating soft tissue swelling/inflammation/restriction, improving soft tissue extensibility and allowing for proper ROM for normal function with self care, reaching, carrying, lifting, house/yardwork, driving/computer work    ADL Training:  [x] (03160) Provided self-care/home management training related to activities of daily living and compensatory training, and/or use of adaptive equipment      Charges:  Timed Code Treatment Minutes: 42   Total Treatment Minutes: 42   Worker's Comp: Time In/Time Out     [] EVAL (LOW) 50327 (typically 20 minutes face-to-face)    [] EVAL (MOD) 98010 (typically 30 minutes face-to-face)  [] EVAL (HIGH) 61436 (typically 45 minutes face-to-face)  [] OT Re-eval (06109)       [x] Demarco [phone] x 2     [] GGIZE(40272)  [x] NMR (77448) x  1    [] Estim (attended) (57773)   [] Manual ([phone]) x      [] US (10871)  [] TA (15526) x      [] Paraffin (88637)  [] ADL  (64741) x     [] Splint/L code:    [] Estim (unattended) 33 [phone])  [] Fluidotherapy (69812)  [] Other:      ASSESSMENT:  Slow progress of hand strength, however definite gains identified. Have reinforced frequency of HEP performance for promote maximal recovery of ROM, strength     GOALS:  Patient stated goal: recover use of my L hand    [x] Progressing: [] Met: [] Not Met: [] Adjusted    Therapist goals for Patient:   Short Term Goals: To be achieved in: 2 weeks  1. Independent in HEP and progression per patient tolerance, in order to prevent re-injury. [x] Progressing: [] Met: [] Not Met: [] Adjusted   2. Patient will have a decrease in pain to facilitate improvement in movement, function, and ADLs as indicated by Functional Deficits. [x] Progressing: [] Met: [] Not Met: [] Adjusted    Long Term Goals to be achieved in 6-8 additional weeks (through 8/4/20), including patient directed goals to address patient identified performance deficits:  1) Pt to be independent in graded HEP progression with a good level of effort and compliance. [x] Progressing: [] Met: [] Not Met: [] Adjusted   2) Pt to report a score of </= 30 % on the Quick DASH disability questionnaire for increased performance with carrying, moving, and handling objects. [x] Progressing: [] Met: [] Not Met: [] Adjusted   3) Pt will demonstrate increased ROM to L SF adduction to full for improved independence with use/manipulation of buttons/fasteners, feeding utensils. [] Progressing: [x] Met: 6/9/2020  [] Not Met: [] Adjusted   4) Pt will demonstrate increased strength to L /pinch >/= 50% of R for improved independence with opening jars/lids. [x] Progressing: [] Met: [] Not Met: [] Adjusted   5) Pt will have a decrease in pain to 1-2/10 to facilitate return to work duties (prolonged keyboarding, writing). [] Progressing: [x] Met:6/9/2020  [] Not Met: [] Adjusted      Overall Progression Towards Functional Goals/Treatment Progress Update:  [x] Patient is progressing as expected towards functional goals listed - goals remain appropriate. [] Progression is slowed due to complexities/impairments listed. [] Progression has been slowed due to co-morbidities.   [] Plan just implemented, too soon to assess goals progression <30 days  [] Goals require adjustment due to lack of progress  [] Patient is not progressing as expected and requires additional follow up with physician  [] All goals are met  [] Other:     Prognosis for POC: [x] Good [] Fair  [] Poor    Patient requires continued skilled intervention: [x] Yes  [] No    Treatment/Activity

## 2020-07-28 ENCOUNTER — OFFICE VISIT (OUTPATIENT)
Dept: FAMILY MEDICINE CLINIC | Age: 65
End: 2020-07-28
Payer: MEDICARE

## 2020-07-28 VITALS
RESPIRATION RATE: 16 BRPM | WEIGHT: 170.6 LBS | DIASTOLIC BLOOD PRESSURE: 72 MMHG | HEIGHT: 67 IN | TEMPERATURE: 98.6 F | HEART RATE: 54 BPM | SYSTOLIC BLOOD PRESSURE: 122 MMHG | BODY MASS INDEX: 26.78 KG/M2 | OXYGEN SATURATION: 98 %

## 2020-07-28 PROCEDURE — G8417 CALC BMI ABV UP PARAM F/U: HCPCS | Performed by: INTERNAL MEDICINE

## 2020-07-28 PROCEDURE — 3017F COLORECTAL CA SCREEN DOC REV: CPT | Performed by: INTERNAL MEDICINE

## 2020-07-28 PROCEDURE — 1123F ACP DISCUSS/DSCN MKR DOCD: CPT | Performed by: INTERNAL MEDICINE

## 2020-07-28 PROCEDURE — 1036F TOBACCO NON-USER: CPT | Performed by: INTERNAL MEDICINE

## 2020-07-28 PROCEDURE — 99214 OFFICE O/P EST MOD 30 MIN: CPT | Performed by: INTERNAL MEDICINE

## 2020-07-28 PROCEDURE — 4040F PNEUMOC VAC/ADMIN/RCVD: CPT | Performed by: INTERNAL MEDICINE

## 2020-07-28 PROCEDURE — G8427 DOCREV CUR MEDS BY ELIG CLIN: HCPCS | Performed by: INTERNAL MEDICINE

## 2020-07-28 ASSESSMENT — ENCOUNTER SYMPTOMS
ALLERGIC/IMMUNOLOGIC NEGATIVE: 1
GASTROINTESTINAL NEGATIVE: 1
RESPIRATORY NEGATIVE: 1

## 2020-07-28 NOTE — PROGRESS NOTES
2020) 90 tablet 5    vitamin D (CHOLECALCIFEROL) 1000 UNIT TABS tablet Take 1,000 Units by mouth daily      aspirin 81 MG tablet Take 81 mg by mouth daily. No current facility-administered medications for this visit. No Known Allergies  Social History     Tobacco Use    Smoking status: Former Smoker     Packs/day: 0.50     Years: 15.00     Pack years: 7.50     Last attempt to quit: 2009     Years since quittin.5    Smokeless tobacco: Never Used   Substance Use Topics    Alcohol use: Yes     Comment: SOCIAL      Family History   Problem Relation Age of Onset    High Blood Pressure Mother     Heart Disease Father        Review of Systems   Constitutional: Negative. Respiratory: Negative. Cardiovascular: Negative. Gastrointestinal: Negative. Endocrine: Negative. Genitourinary: Negative. Musculoskeletal: Negative. Skin: Negative. Allergic/Immunologic: Negative. Neurological: Negative. Hematological: Negative. Psychiatric/Behavioral: Negative. Vitals:    20 1541 20 1605   BP: 124/76 122/72   Pulse: 54    Resp: 16    Temp: 98.6 °F (37 °C)    SpO2: 98%    Weight: 170 lb 9.6 oz (77.4 kg)    Height: 5' 7\" (1.702 m)      Objective:   Physical Exam  Constitutional:       General: He is not in acute distress. Appearance: Normal appearance. He is well-developed. He is obese. He is not diaphoretic. HENT:      Head: Normocephalic and atraumatic. Neck:      Musculoskeletal: Full passive range of motion without pain and normal range of motion. Neck rigidity and muscular tenderness present. Thyroid: No thyroid mass or thyromegaly. Vascular: Normal carotid pulses. No carotid bruit, hepatojugular reflux or JVD. Trachea: Trachea and phonation normal.   Cardiovascular:      Rate and Rhythm: Normal rate and regular rhythm. No extrasystoles are present. Chest Wall: PMI is not displaced. Pulses: Normal pulses.  No decreased pulses. Carotid pulses are 2+ on the right side and 2+ on the left side. Radial pulses are 2+ on the right side and 2+ on the left side. Femoral pulses are 2+ on the right side and 2+ on the left side. Popliteal pulses are 2+ on the right side and 2+ on the left side. Dorsalis pedis pulses are 2+ on the right side and 2+ on the left side. Posterior tibial pulses are 2+ on the right side and 2+ on the left side. Heart sounds: Normal heart sounds. No murmur. No friction rub. No gallop. Pulmonary:      Effort: Pulmonary effort is normal. No tachypnea, bradypnea, accessory muscle usage or respiratory distress. Breath sounds: Normal breath sounds. No decreased breath sounds, wheezing, rhonchi or rales. Abdominal:      Hernia: No hernia is present. There is no hernia in the ventral area. Musculoskeletal:         General: Tenderness (L hand pain) present. Right lower leg: No edema. Left lower leg: No edema. Lymphadenopathy:      Cervical: No cervical adenopathy. Skin:     General: Skin is warm and dry. Capillary Refill: Capillary refill takes less than 2 seconds. Coloration: Skin is not jaundiced or pale. Findings: No abrasion, bruising, erythema, lesion or rash. Nails: There is no clubbing. Neurological:      Mental Status: He is alert and oriented to person, place, and time. Mental status is at baseline. He is not disoriented. Cranial Nerves: No cranial nerve deficit. Sensory: No sensory deficit. Motor: Weakness and abnormal muscle tone present. No tremor or atrophy. Coordination: Coordination normal.      Gait: Gait normal.      Deep Tendon Reflexes: Reflexes normal.      Comments: L hand weak and unable to abduct fingers. Psychiatric:         Mood and Affect: Mood normal.         Speech: Speech normal.         Behavior: Behavior normal.         Thought Content:  Thought content normal. Judgment: Judgment normal.         Assessment:      Problem   Cubital Tunnel Syndrome On Left. Weakness s/p surgery cervical and Cubital canal.     Cervical Neck Pain With Evidence of Disc Disease. Weakness as above. Supraventricular tachycardia ([address] NSR. No new c/o. Essential Hypertension, Benign. Good w/ meds. Plan:      All above problems reviewed and the found to be unchanged except for the following :     Cubital Tunnel Syndrome On Left. To surgeon as scheduled. Cervical Neck Pain With Evidence of Disc Disease. To Neurosurgery as scheduled. Supraventricular tachycardia ([address] Call if new c/o. Essential Hypertension, Benign. Continue present meds. Home BP checks. Call if>140/90. Improve diet. Avoid caffeine and salt. Call if new c/o w/ meds.                Aldo Doyle MD

## 2020-07-28 NOTE — PATIENT INSTRUCTIONS
All above problems reviewed and the found to be unchanged except for the following :     Cubital Tunnel Syndrome On Left. To surgeon as scheduled. Cervical Neck Pain With Evidence of Disc Disease. To Neurosurgery as scheduled. Supraventricular tachycardia ([address] Call if new c/o. Essential Hypertension, Benign. Continue present meds. Home BP checks. Call if>140/90. Improve diet. Avoid caffeine and salt. Call if new c/o w/ meds.

## 2020-07-29 DIAGNOSIS — R73.9 HYPERGLYCEMIA: ICD-10-CM

## 2020-07-29 LAB
ANION GAP SERPL CALCULATED.3IONS-SCNC: 15 MMOL/L (ref 3–16)
BUN BLDV-MCNC: 24 MG/DL (ref 7–20)
CALCIUM SERPL-MCNC: 9.9 MG/DL (ref 8.3–10.6)
CHLORIDE BLD-SCNC: 103 MMOL/L (ref 99–110)
CO2: 27 MMOL/L (ref 21–32)
CREAT SERPL-MCNC: 1.1 MG/DL (ref 0.8–1.3)
GFR AFRICAN AMERICAN: >60
GFR NON-AFRICAN AMERICAN: >60
GLUCOSE BLD-MCNC: 97 MG/DL (ref 70–99)
POTASSIUM SERPL-SCNC: 4.5 MMOL/L (ref 3.5–5.1)
SODIUM BLD-SCNC: 145 MMOL/L (ref 136–145)

## 2020-08-11 ENCOUNTER — OFFICE VISIT (OUTPATIENT)
Dept: ORTHOPEDIC SURGERY | Age: 65
End: 2020-08-11

## 2020-08-11 VITALS — HEIGHT: 67 IN | BODY MASS INDEX: 26.68 KG/M2 | WEIGHT: 170 LBS

## 2020-08-11 PROCEDURE — 99024 POSTOP FOLLOW-UP VISIT: CPT | Performed by: ORTHOPAEDIC SURGERY

## 2020-08-11 NOTE — PROGRESS NOTES
Chief Complaint   Patient presents with    Follow-up      s/p 4/2/2020 left cubital tunnel release       HISTORY OF PRESENT ILLNESS:  Jeimy Brothzack is a 76 y.o.  patient here for repeat postoperative evaluation after left elbow cubital tunnel release, in situ, 4.5 months ago. Nocturnal symptoms are gone but he still has some residual tingling of the ring and small finger and weakness in the hand. Once again preoperative double crush syndrome with compression at the neck and at the elbow. Preoperative EMG also suggestive of a lower trunk brachial plexopathy. Patient has been very compliant with his postoperative rehabilitation and therapy. He is frustrated that he has not made greater gains with return of  strength and sensation of the left hand. ROS:  ROS neg     Past medical history is reviewed again today, no changes to report    PHYSICAL EXAMINATION:  Patient is alert and pleasant, in no acute distress. The affected extremity is examined today. Left forearm reveals atrophy volarly compared to the right side. This appears to be worse than last visit. There is also some concerning flattening of the thenar area at this point that I do not feel was significant prior. The ulnar innervated atrophy of the left hand is still noted, does not appear to be worsened today. There is a positive Wartenberg sign, negative Benedictine sign. Incision at the elbow is well-healed. Elbow motion is full without ulnar nerve instability. There is still decreased light sensation ring and small finger with decreased  strength    X-ray left elbow today:  3 views, left elbow, impression: Overall good alignment. No fracture or dislocation noted. No large spur apparent near the cubital tunnel. IMPRESSION AND PLAN: Left elbow cubital tunnel syndrome  We will continue with our current care plan. Observation and monitoring of the function and nerve recovery of the left arm at this point.   I suspect that he has more going on here than a C8 radiculopathy and cubital tunnel syndrome. I have recommended and will help arrange appointment with neurologist shortly. I am concerned that there is another underlying etiology for his forearm atrophy and thenar atrophy which would not fit with his cervical C8 radiculopathy and cubital tunnel syndrome. There was a question of a possible lower trunk plexopathy on preoperative nerve testing, which may be further developing at this point. In addition, we have ordered MRI of the left elbow to rule out mass or ulnar nerve lesion. All questions and concerns were addressed today. Patient is in agreement with the plan. Jorge Guzman MD  Hand & Upper Extremity Surgery  [address]  A partner of [facility] ([facility])        Please note that this transcription was created using voice recognition software. Any errors are unintentional and may be due to voice recognition transcription.

## 2020-08-12 ENCOUNTER — HOSPITAL ENCOUNTER (OUTPATIENT)
Dept: MRI IMAGING | Age: 65
Discharge: HOME | End: 2020-08-12
Payer: MEDICARE

## 2020-08-12 PROCEDURE — 73221 MRI JOINT UPR EXTREM W/O DYE: CPT

## 2020-08-13 ENCOUNTER — HOSPITAL ENCOUNTER (OUTPATIENT)
Dept: OCCUPATIONAL THERAPY | Age: 65
Setting detail: THERAPIES SERIES
Discharge: HOME | End: 2020-08-13
Payer: MEDICARE

## 2020-08-13 PROCEDURE — 97112 NEUROMUSCULAR REEDUCATION: CPT | Performed by: OCCUPATIONAL THERAPIST

## 2020-08-13 PROCEDURE — 97110 THERAPEUTIC EXERCISES: CPT | Performed by: OCCUPATIONAL THERAPIST

## 2020-08-13 PROCEDURE — G0283 ELEC STIM OTHER THAN WOUND: HCPCS | Performed by: OCCUPATIONAL THERAPIST

## 2020-08-13 NOTE — FLOWSHEET NOTE
[address]  [address], [address] [facility], [address] [facility]  Phone: [phone] Fax: [phone]        Occupational Therapy Re-Certification Plan of Gunnar Matthews      Dear  Dr Ella Farias,    We had the pleasure of treating the following patient for occupational therapy services at [address] [place] [address]. A summary of our findings can be found in the updated assessment below. This includes our plan of care. If you have any questions or concerns regarding these findings, please do not hesitate to contact me at the office phone number checked above. Thank you for the referral.     Physician Signature:________________________________Date:__________________  By signing above (or electronic signature), therapists plan is approved by physician    Date Range Of Visits: 20-20  Total Visits to Date: 4  Overall Response to Treatment:   []Patient is responding well to treatment and improvement is noted with regards to goals   []Patient should continue to improve in reasonable time if they continue HEP   []Patient has plateaued and is no longer responding to skilled OT intervention    []Patient is getting worse and would benefit from return to referring MD   []Patient unable to adhere to initial POC   [x]Other: feel gains have been made since initiation of treatment in 2020 however strength and ROM gains have slowed; atrophy of hand (specifically thenar eminence) noted. Have updated HEP to address current weaknesses and atrophy of L hand; feel addition of NMES home unit would also be beneficial to address muscle atrophy.        Occupational Therapy Treatment Note/ Progress Report:     Date:  2020    Patient Name:  Isamar Walton    :  1955  MRN: [id number]    Medical/Treatment Diagnosis Information:  · Diagnosis: G56.22 (ICD-10-CM) - Ulnar neuropathy of left upper extremity   · Treatment Diagnosis: L elbow pain T27.291     Insurance/Certification information:  OT Insurance Information: Medicare  Physician Information:  Referring Practitioner: Alia Riley MD  Has the plan of care been signed (Y/N):        []  Yes  [x]  No       Visit # Insurance Allowable Auth Required   4 BMN []  Yes [x]  No        Is this a Progress Report:     [x]  Yes  []  No      If Yes:  Date Range for reporting period:  Beginning 4/16/20  Ending 8/13/20    Progress report will be due (10 Rx or 30 days whichever is less): 5/45/42  Recertification will be due (POC Duration  / 90 days whichever is less): 8/13/20    Date of Injury: NA  Date of Surgery: 4/2/20 s/p cubital tunnel release operative site possible transposition     Date of Patient follow up with Physician: 5/26/20     RESTRICTIONS/PRECAUTIONS: surgical precautions - per 5/26/20 MD note - continue with our current care plan. Progressive rehabilitation of the left elbow hand and wrist, he may benefit from a strength ball. He has his home exercises for cubital tunnel rehabilitation. He may return to outpatient hand therapy, when it safely opens with current coronavirus pandemic. I will see him in approximately 2 and half to 3 months, prior to his relocation to [place].     Latex Allergy:  [x]? No      []? Yes                    Pacemaker:  [x]? No       []? Yes      Preferred Language for Healthcare:   [x]? English       []? other:      Functional Scale: 55% (Quick DASH)                                 Date assessed:  8/13/2020     SUBJECTIVE:  seen by hand surgeon for follow up earlier this week; frustrated by persistent weakness of hand, notably of L thumb; MD recommended follow up with therapy to review and update HEP as needed; had MRI of L arm yesterday, also referred to neurologist for consultation    Patient reported deficits/history of current problem: February of this year noted increased pain at the neck and then within 3 days had numbness/tingling into RF/SF; MRI and EMG revealed neck and elbow dysfunction; has had therapy for neck several weeks ago with minimal relief; seen by Dr London Linton to address symptoms at the elbow     Pain Scale: 0/10     OBJECTIVE:       Date: 6/9/20 7/20/20 8/13/250   Objective Measures/Tests: S/p 9 weeks, 5 days     ROM:Digits: tips to DPFC   0cm IF_SF  (small lag in IF IP ext and full flexion initially)    Able to fully abd/add SF, however lag in abd/add IF (1cm from IF tip to side of LF) Full flexion (0cm IF-SF),  Full extension of all digits MP & IPs - tendency for slight flexion of IF IPs, however able to actively fully extend with cueing    Able to fully abd/add SF     Slight difficulty abducting IF (1cm from IF tip to side of LF)   Full flexion (0cm IF-SF),  Full extension of all digits MP & IPs - tendency for slight flexion of IF IPs, however able to actively fully extend with cueing    Able to add digits LF-SF, however slow movement quality and increased time needed for full adduction; difficulty abducting IF (1cm from IF tip to side of LF)       Thumb tip to DPFC    Thumb RA              PA    Thumb MP  IP     2.5cm (passively 0cm to DPFC) 2.5cm (passively 0cm to [facility]) 2.5cm  (passively 0cm to [facility])    50 B  R 50   L 27     L 15/65 ( R 0/65)  L 0/17  ( R 0/55)   Wrist ext/flex            rd/ud   75/75 75/75 75/75  Cramping episode (wrist flexors) noted with end range flexion     FA sup/pron    Elbow ext/flex    Shoulder   85/80    5HE/145    WNL 90/80    12HE/145    WNL 90/90    12HE/145       Strength:  II  Lateral Pinch  3 Point Pinch  Tip Pinch  MMT: elbow ext/flex, wrist ext/flex  Thumb APB/OP R 102       L 16  24       0.5  16          1  15          0 R 105       L 23  26       2  19      1  14    0.5 R 104         L 24  24        1  22        1  14        1  5/5 B  2-/5           Observations:    Notable atrophy of L thenar eminence observed;     Other:                  MODALITIES:      Fluidotherapy (36410)      Estim activity to help facilitate correct movement patterns, avoidance of substitution patterns               HEP Training/Review Access Code: Georgina Andrade   URL: [email or web address]/   Date: 06/09/2020   Prepared by: Tamica Bentley     Exercises   Standing Ulnar Nerve Glide - 3-5 reps - 3-4x daily - 7x weekly   Finger Extension/Flexion - Tendon Gliding - 10 reps - 3-4x daily - 7x weekly   Finger Abduction/Adduction - 10 reps - 3-4x daily - 7x weekly   Composite Thumb Flexion/Extension - 3-5 reps - 3-4x daily - 7x weekly   Wrist Extension/Flexion - 3-5 reps - 3-4x daily - 7x weekly   Forearm Rotation (Supination/Pronation) - 3-5 reps - 3-4x daily - 7x weekly   Seated Elbow Flexion and Extension AROM - 3-5 reps - 3-4x daily - 7x weekly   Composite Wrist Extensor Stretch - Shoulder Down - 3-5 reps - 3-5 sec hold - 3-4x daily - 7x weekly   Composite Wrist Flexor Stretch - Shoulder Down - 3-5 reps - 3-5 hold - 3-4x daily - 7x weekly   Sponge squeezes - 5-15 reps - 1-2x daily - 7x weekly   Pinch strengthening - 5-10 reps - 1-2x daily - 7x weekly   Flat  - 5-10 reps - 1-2x daily - 7x weekly   Lateral Pinch Strengthening - 5-10 reps - 1-2x daily - 7x weekly   Finger Adduction with Putty - 5-10 reps - 1-2x daily - 7x weekly   Finger Abduction with Rubber Band - 5-10 reps - 1-2x daily - 7x weekly   Seated Finger Abduction with Resistance - 5-10 reps - 1-2x daily - 7x weekly    Reviewed HEP, reinforced frequency and ways to grade, progress exercises to promote maximal recovery of ROM, strength Access Code: [email or web address]   URL: Scan/   Date: 08/13/2020   Prepared by:  Tamica Bentley     Exercises   Sponge squeezes - 5-15 reps - 1-2x daily - 7x weekly   Pinch strengthening - 5-10 reps - 1-2x daily - 7x weekly   Lateral Pinch Strengthening - 5-10 reps - 1-2x daily - 7x weekly   Flat  - 5-10 reps - 1-2x daily - 7x weekly   Finger Adduction with Putty - 5-10 reps - 1-2x daily - 7x weekly   Finger Abduction with Rubber Band - 5-10 reps - 1-2x daily - 7x weekly   Seated Finger Abduction with Resistance - 5-10 reps - 1-2x daily - 7x weekly   Thumb Palmar Abduction - 10 reps - 3-4x daily - 7x weekly   Standing Ulnar Nerve Glide - 3-5 reps - 3-4x daily - 7x weekly   Standing Median Nerve Glide - 3-5 reps - 3-4x daily - 7x weekly   Finger Extension/Flexion - Tendon Gliding - 10 reps - 3-4x daily - 7x weekly   Finger Abduction/Adduction - 10 reps - 3-4x daily - 7x weekly   Composite Thumb Flexion/Extension - 3-5 reps - 3-4x daily - 7x weekly   Composite Wrist Flexor Stretch - Shoulder Down - 3-5 reps - 3-5 hold - 3-4x daily - 7x weekly   Composite Wrist Extensor Stretch - Shoulder Down - 3-5 reps - 3-5 sec hold - 3-4x daily - 7x weekly                  Splinting      Lcode:      Orthotic Mgmt, Subsequent Enc (24553)      Orthotic Mgmt & Training (69379)            Other:                                Therapeutic Exercise & NMR:  [x] (42258) Provided verbal/tactile cueing for activities related to strengthening, flexibility, endurance, ROM  for improvements in scapular, scapulothoracic and UE control with self care, reaching, carrying, lifting, house/yardwork, driving/computer work. [x] (18894) Provided verbal/tactile cueing for activities related to improving balance, coordination, kinesthetic sense, posture, motor skill, proprioception  to assist with  scapular, scapulothoracic and UE control with self care, reaching, carrying, lifting, house/yardwork, driving/computer work.     Therapeutic Activities & NMR:    [x] (17802 or 31308) Provided verbal/tactile cueing for activities related to improving balance, coordination, kinesthetic sense, posture, motor skill, proprioception and motor activation to allow for proper function of scapular, scapulothoracic and UE control with self care, carrying, lifting, driving/computer work    Home Exercise Program:    [x] (55568) Reviewed/Progressed HEP activities related to strengthening, flexibility, endurance, ROM of scapular, scapulothoracic and UE control with self care, reaching, carrying, lifting, house/yardwork, driving/computer work  [x] (41563) Reviewed/Progressed HEP activities related to improving balance, coordination, kinesthetic sense, posture, motor skill, proprioception of scapular, scapulothoracic and UE control with self care, reaching, carrying, lifting, house/yardwork, driving/computer work      Manual Treatments:  PROM / STM / Oscillations-Mobs:  G-I, II, III, IV (PA's, Inf., Post.)  [x] (90657) Provided manual therapy to mobilize soft tissue/joints of cervical/CT, scapular GHJ and UE for the purpose of modulating pain, promoting relaxation,  increasing ROM, reducing/eliminating soft tissue swelling/inflammation/restriction, improving soft tissue extensibility and allowing for proper ROM for normal function with self care, reaching, carrying, lifting, house/yardwork, driving/computer work    ADL Training:  [x] (36563) Provided self-care/home management training related to activities of daily living and compensatory training, and/or use of adaptive equipment      Charges:  Timed Code Treatment Minutes: 65   Total Treatment Minutes: 80   Worker's Comp: Time In/Time Out     [] EVAL (LOW) 50209 (typically 20 minutes face-to-face)    [] EVAL (MOD) 60484 (typically 30 minutes face-to-face)  [] EVAL (HIGH) 03660 (typically 45 minutes face-to-face)  [] OT Re-eval (82907)       [x] Demarco [phone] x 3     [] VMILI(17385)  [x] NMR (88540) x  1    [] Estim (attended) (10159)   [] Manual ([id number]) x      [] US (53695)  [] TA (31510) x      [] Paraffin (83673)  [] ADL  (71113) x     [] Splint/L code:    [x] Estim (unattended) (13078)  [] Fluidotherapy (02903)  [] Other:    Assessment: no significant gains noted in L hand rom and strength with notable atrophy of hand muscles (specifically thenar eminence); feel pt would benefit from addition of home NMES unti to address muscle atrophy

## 2020-08-14 ENCOUNTER — TELEPHONE (OUTPATIENT)
Dept: ORTHOPEDIC SURGERY | Age: 65
End: 2020-08-14

## 2020-08-14 NOTE — TELEPHONE ENCOUNTER
Pt has been scheduled to see Dr. Myrna Garcai (neurology), thurs, 8/20, 1230pm, [address], [address] [place] [address], [address], in the urgent care building.     ----- Message from Stevie Zavala MD sent at 8/13/2020  9:30 AM EDT -----  Please let patient know MRI left elbow normal, some expected postop swelling, etc.  We can see him after neurology appt    Thanks    ----- Message -----  From: Fabio Francois Incoming Radiology Results From [facility]  Sent: 8/13/2020   8:04 AM EDT  To: Stevie Zavala MD

## 2020-10-02 ENCOUNTER — OFFICE VISIT (OUTPATIENT)
Dept: ORTHOPEDIC SURGERY | Age: 65
End: 2020-10-02
Payer: MEDICARE

## 2020-10-02 VITALS — HEIGHT: 67 IN | BODY MASS INDEX: 26.68 KG/M2 | WEIGHT: 169.97 LBS

## 2020-10-02 PROBLEM — G56.22 ULNAR NEUROPATHY OF LEFT UPPER EXTREMITY: Status: ACTIVE | Noted: 2020-10-02

## 2020-10-02 PROCEDURE — 1123F ACP DISCUSS/DSCN MKR DOCD: CPT | Performed by: ORTHOPAEDIC SURGERY

## 2020-10-02 PROCEDURE — 1036F TOBACCO NON-USER: CPT | Performed by: ORTHOPAEDIC SURGERY

## 2020-10-02 PROCEDURE — G8417 CALC BMI ABV UP PARAM F/U: HCPCS | Performed by: ORTHOPAEDIC SURGERY

## 2020-10-02 PROCEDURE — 3017F COLORECTAL CA SCREEN DOC REV: CPT | Performed by: ORTHOPAEDIC SURGERY

## 2020-10-02 PROCEDURE — G8484 FLU IMMUNIZE NO ADMIN: HCPCS | Performed by: ORTHOPAEDIC SURGERY

## 2020-10-02 PROCEDURE — 99213 OFFICE O/P EST LOW 20 MIN: CPT | Performed by: ORTHOPAEDIC SURGERY

## 2020-10-02 PROCEDURE — 4040F PNEUMOC VAC/ADMIN/RCVD: CPT | Performed by: ORTHOPAEDIC SURGERY

## 2020-10-02 PROCEDURE — G8427 DOCREV CUR MEDS BY ELIG CLIN: HCPCS | Performed by: ORTHOPAEDIC SURGERY

## 2020-10-02 NOTE — PROGRESS NOTES
Chief Complaint   Patient presents with    Arm Pain     Left Arm Pain       HISTORY OF PRESENT ILLNESS:  Christ Pate is a 76 y.o.  patient here for repeat evaluation after left cubital tunnel in situ release 6 months ago. Patient has still been dealing with left arm and left hand weakness. He has been very aggressive with his rehabilitation, both outpatient and at home. He has been very compliant with his rehabilitation needs. Patient was recently seen and thoroughly worked up by 1 of my local neurology colleagues, who has concern for brachial plexus disease, still of uncertain etiology. Regarding the left elbow surgery, his pain is gone and he denies snapping at the left elbow. He feels that numbness in the ulnar left hand is approximately 25 to 30% better than before surgery. ROS:  ROS neg     Past medical history is reviewed again today, no changes to report    PHYSICAL EXAMINATION:  Patient is alert and pleasant, in no acute distress. The affected extremity is examined today. Left elbow reveals a well-healed surgical site. Elbow motion is full. No ulnar nerve instability. There is atrophy in the left hand involving innervation of the ulnar and median nerve. He has atrophy of the thenar area and of the small muscles on the dorsum of the left hand.  weakness left hand compared to the right. Negative Benedictine. He has subjective decreased light touch sensation of all fingers left hand    X-rays: None today    IMPRESSION AND PLAN: Left hand weakness  We will continue with our current care plan. No further surgical intervention on the left arm recommended at this point. He follows up with his cervical spine surgeon later today. He has follow-up with the neurologist in the next couple weeks, who have ordered a consultation with the [facility] regarding his brachial plexus disease of uncertain etiology.   We would be happy to provide further follow-up and monitoring as needed going forward, he understands this. He does now reside in [place]. We would be happy to provide a list of referring providers down there, if he so desires. All questions and concerns were addressed today. Patient is in agreement with the plan. Angel Iyer MD  Hand & Upper Extremity Surgery  [facility]  A partner of [facility] ([facility])        Please note that this transcription was created using voice recognition software. Any errors are unintentional and may be due to voice recognition transcription.

## 2020-11-02 RX ORDER — LISINOPRIL 40 MG/1
40 TABLET ORAL DAILY
Qty: 30 TABLET | Refills: 5 | Status: SHIPPED | OUTPATIENT
Start: 2020-11-02 | End: 2021-02-01 | Stop reason: SDUPTHER

## 2021-02-01 RX ORDER — LISINOPRIL 40 MG/1
40 TABLET ORAL DAILY
Qty: 30 TABLET | Refills: 5 | Status: SHIPPED | OUTPATIENT
Start: 2021-02-01

## 2021-03-12 ENCOUNTER — TELEPHONE (OUTPATIENT)
Dept: FAMILY MEDICINE CLINIC | Age: 66
End: 2021-03-12

## 2021-03-12 DIAGNOSIS — Z12.5 SCREENING PSA (PROSTATE SPECIFIC ANTIGEN): ICD-10-CM

## 2021-03-12 DIAGNOSIS — E04.1 THYROID NODULE: Primary | ICD-10-CM

## 2021-03-12 DIAGNOSIS — E78.5 DYSLIPIDEMIA (HIGH LDL; LOW HDL): ICD-10-CM

## 2021-03-12 DIAGNOSIS — I10 ESSENTIAL HYPERTENSION, BENIGN: ICD-10-CM

## 2021-03-12 NOTE — TELEPHONE ENCOUNTER
Patient will be going to [employer] the week prior to his physical scheduled on 4-26-21. Orders please.

## 2021-04-08 ENCOUNTER — TELEPHONE (OUTPATIENT)
Dept: FAMILY MEDICINE CLINIC | Age: 66
End: 2021-04-08

## 2021-04-08 NOTE — TELEPHONE ENCOUNTER
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 Aleida Patterson schedule so he will need to reestablish with a doctor in his demographic. He said he has enjoyed his time with Dr. Ezequiel Horton and will be sending a card in the mail to thank him. Both of patients appointments have been cancelled and Dr. Ezequiel Horton removed as the PCP.

## 2021-05-25 ENCOUNTER — TELEPHONE (OUTPATIENT)
Dept: FAMILY MEDICINE CLINIC | Age: 66
End: 2021-05-25

## 2021-05-25 NOTE — TELEPHONE ENCOUNTER
Patient used to be a patient of Dr. Miquel Sandifer but has moved to hospitals and is established with a new doctor there. He requested his medical records through [employer] and the records still have not been delivered to the office. His doctors MA also faxed a request over to us and still hasn't heard back. I told him I have no record of such request. He says that his doctor is concerned about some recent PSA labs of his and would like his historical labs sent over to him. I called his new doctors office and confirmed what fax number they used - they said they faxed us the request for medical records on 4/26/21 to [phone]. I asked that they re-fax that request over to us, the nurse said she will do so.      The new doctors office is [place] [facility], Dr. Diego Moore Ph. [phone] Fx: [phone]

## 2021-07-10 NOTE — FLOWSHEET NOTE
Seated Elbows on Knees  x3'     Multifids Crutch   x15     Bridge 20x     Flexion SLR      Prone SLR's      Sidelying Abduction SLR      Sidelying Clam shells 20x B Green loop    Supine Clam Shells against resistance      TA Hip Fall Out  x10 each     Bridge with single knee extension      Side Plank      SKC  2x30\" each     Lumbar rotation  15\" x5 B     Single knee to chest      Double knee to chest  10x10\"     Hand Knee Rock/child's pose  x10     Prone on elbows      Prone press up      Standing Extension      Pelvic tilts 15x5\"       [x] Provided education in neutral spine position as it relates to the functional positions of  [x] sitting,    [x] standing   [] bending. [] Provided verbal cueing for self stretching of:   [] Hamstrings   [] Piriformis    [] Figure 4 technique    [] Knee to opposite shoulder technique   [] Quad   [] Hip Flexors  [x] Provided education in multimodal approach to treatment to include proper dosage of postural awareness, body mechanics awareness, hip flexibility, lumbopelvic stabilization, spinal ROM, manual therapy and modalities. [x]  Provided education in selective activation of the following:   [x]TA (abdominal drawing in maneuver)   []Multifidus (prone verbal cueing)    Therapeutic Exercise and NMR EXR  [x] (60321) Provided verbal/tactile cueing for activities related to strengthening, flexibility, endurance, ROM  for improvements in proximal hip and core control with self care, mobility, lifting and ambulation.  [] (48555) Provided verbal/tactile cueing for activities related to improving balance, coordination, kinesthetic sense, posture, motor skill, proprioception  to assist with core control in self care, mobility, lifting, and ambulation.      Therapeutic Activities:    [] (03096 or 45031) Provided verbal/tactile cueing for activities related to improving balance, coordination, kinesthetic sense, posture, motor skill, proprioception and motor activation to allow limitation or restriction in:  - changing and maintaining body position  - Patient will demonstrate an increase in functional strength to allow for proper functional mobility as indicated by patients Functional Deficits. - Patient will return to all desired, higher level functional activities without increased symptoms or restriction. Progression Towards Functional goals:  [] Patient is progressing as expected towards functional goals listed. [] Progression is slowed due to complexities listed. [] Progression has been slowed due to co-morbidities. [x] Plan just implemented, too soon to assess goals progression  [] Other:     ASSESSMENT:  Patient given strategies to manage back pain with standing. Treatment/Activity Tolerance:  [] Patient tolerated treatment well [] Patient limited by fatique  [x] Patient limited by pain  [] Patient limited by other medical complications  [] Other:     Prognosis: [x] Good [] Fair  [] Poor    Patient Requires Follow-up: [x] Yes  [] No    PLAN: See eval  [x] Continue per plan of care [] Alter current plan (see comments)  [] Plan of care initiated [] Hold pending MD visit [] Discharge    Plan Moving Forward:  Initiate and continue a program geared toward manual therapy, posture and body mechanics training, lower extremity flexibility and lumbopelvic stabilization with a graded progression toward more upright and functional positions.     Electronically signed by: Sully Shine PT [id number]proved

## 2022-06-26 RX ORDER — ACETAMINOPHEN 325 MG/1
TABLET ORAL
COMMUNITY

## 2022-06-26 RX ORDER — LISINOPRIL 40 MG/1
TABLET ORAL
COMMUNITY

## 2023-06-23 NOTE — PROGRESS NOTES
hip  Impression no fractures or malalignment identified. Total hip arthroplasty intact and in appropriate position. No evidence of loosening or wear    X-rays of the lumbar spine were also ordered today reviewed. 2 views. AP and lateral views. They demonstrate mild degenerative disc disease and facet arthritis    Impression:  Encounter Diagnoses   Name Primary?  Left hip pain Yes    Pain     History of total hip replacement, left        Office Procedures:  Orders Placed This Encounter   Procedures    XR HIP LEFT (2-3 VIEWS)     74012     Order Specific Question:   Reason for exam:     Answer:   Pain    XR LUMBAR SPINE (2-3 VIEWS)       Treatment Plan: With the patient's long-standing hip pain, cortisone injections into the region, and increasing pain symptoms I have ordered the patient acute phase reactants and a bone scan. We'll follow-up in the office to review the results with Dr. Julita Medeiros. If there is signs of infection on above-mentioned test consideration for hip aspiration under sterile technique in the operating room with IV antibiotics. At the end of visit he has asked for narcotic pain medication. With his hip pain since 2007 with only recent increase and do not feel that this is appropriate. He does fairly well on ibuprofen and will continue taking it.
4 (moderate pain)

## 2023-08-04 PROBLEM — C61 PROSTATE CANCER (HCC): Status: ACTIVE | Noted: 2023-08-04

## 2023-08-04 PROBLEM — G62.9 PERIPHERAL NERVE DISEASE: Status: ACTIVE | Noted: 2023-08-04

## 2023-08-04 PROBLEM — M50.13 CERVICAL DISC DISORDER WITH RADICULOPATHY, CERVICOTHORACIC REGION: Status: ACTIVE | Noted: 2020-04-23

## 2023-08-04 PROBLEM — M75.112 NONTRAUMATIC INCOMPLETE TEAR OF LEFT ROTATOR CUFF: Status: ACTIVE | Noted: 2022-09-07

## 2023-08-04 PROBLEM — J31.0 CHRONIC RHINITIS: Status: ACTIVE | Noted: 2023-08-04

## 2024-01-30 NOTE — TELEPHONE ENCOUNTER
----- Message from Annika Pedraza MD sent at 3/25/2020  2:04 PM EDT -----  Can add to [facility] or [facility]'s schedule. Hard to say if weakness is neck or ulnar nerve related. Doubt he is a candidate for emergency surgery. Jessica Cohen or Reginald Antoine can monitor his progress. For example see him soon then monitor him by phone or exam in 3 - 4 weeks. Thanks. Dorrine Lundborg know that.
Spoke with patient and reviewed Dr. Gabriella Ernst suggestions. He is scheduled to come into the office next Wed to see Estefany Hitchcock.
Information: This plan will allow you to select a body location and will not render the procedure on the note output. It will note the location you select in the morphology.
Detail Level: Simple

## 2025-03-03 ENCOUNTER — NEW PATIENT (OUTPATIENT)
Age: 70
End: 2025-03-03

## 2025-03-03 DIAGNOSIS — H43.813: ICD-10-CM

## 2025-03-03 DIAGNOSIS — H25.13: ICD-10-CM

## 2025-03-03 PROCEDURE — 99204 OFFICE O/P NEW MOD 45 MIN: CPT

## 2025-03-03 PROCEDURE — 92134 CPTRZ OPH DX IMG PST SGM RTA: CPT

## (undated) DEVICE — SET GRAV VENT NVENT CK VLV 3 NDL FREE PRT 10 GTT

## (undated) DEVICE — NEEDLE HYPO 25GA L1.5IN BLU POLYPR HUB S STL REG BVL STR

## (undated) DEVICE — ALCOHOL RUBBING 16OZ 70% ISO

## (undated) DEVICE — UNIVERSAL BLOCK TRAY: Brand: MEDLINE INDUSTRIES, INC.

## (undated) DEVICE — ABDOMINAL PAD: Brand: DERMACEA

## (undated) DEVICE — FORCEP BX STD CAP 240CM RAD JAW 4

## (undated) DEVICE — SOLUTION IV 1000ML LAC RINGERS PH 6.5 INJ USP VIAFLX PLAS

## (undated) DEVICE — ZIMMER® STERILE DISPOSABLE TOURNIQUET CUFF WITH PLC, DUAL PORT, SINGLE BLADDER, 12 IN. (30 CM)

## (undated) DEVICE — SYRINGE MED 10ML LUERLOCK TIP W/O SFTY DISP

## (undated) DEVICE — GAUZE,SPONGE,4"X4",16PLY,STRL,LF,10/TRAY: Brand: MEDLINE

## (undated) DEVICE — CATHETER IV 20GA L1.25IN PNK FEP SFTY STR HUB RADPQ DISP

## (undated) DEVICE — CORD ES L12FT BPLR FRCP

## (undated) DEVICE — SUTURE ETHLN SZ 4-0 L18IN NONABSORBABLE BLK L19MM PS-2 3/8 1667H

## (undated) DEVICE — SET ADMIN PRIMING 7ML L30IN 7.35LB 20 GTT 2ND RLER CLMP

## (undated) DEVICE — ENDO CARRY-ON PROCEDURE KIT INCLUDES SUCTION TUBING, LUBRICANT, GAUZE, BIOHAZARD STICKER, TRANSPORT PAD AND INTERCEPT BEDSIDE KIT.: Brand: ENDO CARRY-ON PROCEDURE KIT

## (undated) DEVICE — COMFO-TEX ELASTIC BANDAGE LATEX FREE, 3INX5YD: Brand: COMFO-TEX™

## (undated) DEVICE — ZIMMER® STERILE DISPOSABLE TOURNIQUET CUFF WITH PLC, DUAL PORT, SINGLE BLADDER, 18 IN. (46 CM)

## (undated) DEVICE — CHLORAPREP 26ML ORANGE

## (undated) DEVICE — BANDAGE COBAN 4 IN COMPR W4INXL5YD FOAM COHESIVE QUIK STK SELF ADH SFT

## (undated) DEVICE — STANDARD HYPODERMIC NEEDLE,POLYPROPYLENE HUB: Brand: MONOJECT

## (undated) DEVICE — 3M™ TEGADERM™ TRANSPARENT FILM DRESSING FRAME STYLE, 1624W, 2-3/8 IN X 2-3/4 IN (6 CM X 7 CM), 100/CT 4CT/CASE: Brand: 3M™ TEGADERM™

## (undated) DEVICE — TOWEL,OR,DSP,ST,BLUE,STD,4/PK,20PK/CS: Brand: MEDLINE

## (undated) DEVICE — MATERIAL PD W2XL4YD ST COT CAST SPLNT NONADHESIVE SPEC

## (undated) DEVICE — GLOVE SURG SZ 75 L12IN FNGR THK94MIL STD WHT LTX FREE

## (undated) DEVICE — Device

## (undated) DEVICE — PADDING CAST W4INXL4YD NONSTERILE COT RAYON MICROPLEATED

## (undated) DEVICE — GOWN,SIRUS,NON REINFRCD,LARGE,SET IN SL: Brand: MEDLINE

## (undated) DEVICE — STERILE POLYISOPRENE POWDER-FREE SURGICAL GLOVES: Brand: PROTEXIS

## (undated) DEVICE — SOLUTION IV IRRIG 500ML 0.9% SODIUM CHL 2F7123